# Patient Record
Sex: MALE | Race: WHITE | NOT HISPANIC OR LATINO | Employment: OTHER | URBAN - METROPOLITAN AREA
[De-identification: names, ages, dates, MRNs, and addresses within clinical notes are randomized per-mention and may not be internally consistent; named-entity substitution may affect disease eponyms.]

---

## 2017-03-11 ENCOUNTER — HOSPITAL ENCOUNTER (INPATIENT)
Facility: CLINIC | Age: 82
LOS: 3 days | Discharge: HOME | DRG: 872 | End: 2017-03-14
Attending: EMERGENCY MEDICINE | Admitting: INTERNAL MEDICINE
Payer: MEDICARE

## 2017-03-11 ENCOUNTER — APPOINTMENT (OUTPATIENT)
Dept: CT IMAGING | Facility: CLINIC | Age: 82
DRG: 872 | End: 2017-03-11
Attending: EMERGENCY MEDICINE
Payer: MEDICARE

## 2017-03-11 ENCOUNTER — APPOINTMENT (OUTPATIENT)
Dept: GENERAL RADIOLOGY | Facility: CLINIC | Age: 82
DRG: 872 | End: 2017-03-11
Attending: EMERGENCY MEDICINE
Payer: MEDICARE

## 2017-03-11 DIAGNOSIS — A41.9 SEPSIS, DUE TO UNSPECIFIED ORGANISM: ICD-10-CM
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
## 2017-03-11 DIAGNOSIS — J10.1 INFLUENZA A: Primary | ICD-10-CM

## 2017-03-11 DIAGNOSIS — R53.1 GENERALIZED WEAKNESS: ICD-10-CM

## 2017-03-11 DIAGNOSIS — R41.0 CONFUSION: ICD-10-CM

## 2017-03-11 DIAGNOSIS — G30.9 ALZHEIMER'S DEMENTIA WITHOUT BEHAVIORAL DISTURBANCE, UNSPECIFIED TIMING OF DEMENTIA ONSET: ICD-10-CM

## 2017-03-11 DIAGNOSIS — F03.90 DEMENTIA WITHOUT BEHAVIORAL DISTURBANCE, UNSPECIFIED DEMENTIA TYPE: ICD-10-CM

## 2017-03-11 DIAGNOSIS — F02.80 ALZHEIMER'S DEMENTIA WITHOUT BEHAVIORAL DISTURBANCE, UNSPECIFIED TIMING OF DEMENTIA ONSET: ICD-10-CM

## 2017-03-11 LAB
ALBUMIN SERPL-MCNC: 4 G/DL (ref 3.4–5)
ALBUMIN UR-MCNC: 30 MG/DL
ALP SERPL-CCNC: 78 U/L (ref 40–150)
ALT SERPL W P-5'-P-CCNC: 30 U/L (ref 0–70)
ANION GAP SERPL CALCULATED.3IONS-SCNC: 5 MMOL/L (ref 3–14)
APPEARANCE UR: CLEAR
AST SERPL W P-5'-P-CCNC: 47 U/L (ref 0–45)
BASOPHILS # BLD AUTO: 0 10E9/L (ref 0–0.2)
BASOPHILS NFR BLD AUTO: 0 %
BILIRUB SERPL-MCNC: 1.2 MG/DL (ref 0.2–1.3)
BILIRUB UR QL STRIP: NEGATIVE
BUN SERPL-MCNC: 16 MG/DL (ref 7–30)
CALCIUM SERPL-MCNC: 8.6 MG/DL (ref 8.5–10.1)
CHLORIDE SERPL-SCNC: 102 MMOL/L (ref 94–109)
CO2 SERPL-SCNC: 30 MMOL/L (ref 20–32)
COLOR UR AUTO: YELLOW
CREAT SERPL-MCNC: 1.11 MG/DL (ref 0.66–1.25)
DIFFERENTIAL METHOD BLD: ABNORMAL
EOSINOPHIL # BLD AUTO: 0 10E9/L (ref 0–0.7)
EOSINOPHIL NFR BLD AUTO: 0 %
ERYTHROCYTE [DISTWIDTH] IN BLOOD BY AUTOMATED COUNT: 12.8 % (ref 10–15)
FLUAV+FLUBV AG SPEC QL: ABNORMAL
FLUAV+FLUBV AG SPEC QL: POSITIVE
GFR SERPL CREATININE-BSD FRML MDRD: 62 ML/MIN/1.7M2
GLUCOSE SERPL-MCNC: 135 MG/DL (ref 70–99)
GLUCOSE UR STRIP-MCNC: NEGATIVE MG/DL
HCT VFR BLD AUTO: 41.4 % (ref 40–53)
HGB BLD-MCNC: 14.3 G/DL (ref 13.3–17.7)
HGB UR QL STRIP: ABNORMAL
HYALINE CASTS #/AREA URNS LPF: 1 /LPF (ref 0–2)
IMM GRANULOCYTES # BLD: 0 10E9/L (ref 0–0.4)
IMM GRANULOCYTES NFR BLD: 0.2 %
INR PPP: 2.04 (ref 0.86–1.14)
INTERPRETATION ECG - MUSE: NORMAL
KETONES UR STRIP-MCNC: 10 MG/DL
LACTATE BLD-SCNC: 1.7 MMOL/L (ref 0.7–2.1)
LEUKOCYTE ESTERASE UR QL STRIP: NEGATIVE
LYMPHOCYTES # BLD AUTO: 0.5 10E9/L (ref 0.8–5.3)
LYMPHOCYTES NFR BLD AUTO: 4 %
MCH RBC QN AUTO: 32.5 PG (ref 26.5–33)
MCHC RBC AUTO-ENTMCNC: 34.5 G/DL (ref 31.5–36.5)
MCV RBC AUTO: 94 FL (ref 78–100)
MONOCYTES # BLD AUTO: 0.5 10E9/L (ref 0–1.3)
MONOCYTES NFR BLD AUTO: 4.5 %
MUCOUS THREADS #/AREA URNS LPF: PRESENT /LPF
NEUTROPHILS # BLD AUTO: 11.1 10E9/L (ref 1.6–8.3)
NEUTROPHILS NFR BLD AUTO: 91.3 %
NITRATE UR QL: NEGATIVE
NRBC # BLD AUTO: 0 10*3/UL
NRBC BLD AUTO-RTO: 0 /100
PH UR STRIP: 6 PH (ref 5–7)
PLATELET # BLD AUTO: 146 10E9/L (ref 150–450)
POTASSIUM SERPL-SCNC: 3.8 MMOL/L (ref 3.4–5.3)
PROT SERPL-MCNC: 7.7 G/DL (ref 6.8–8.8)
RBC # BLD AUTO: 4.4 10E12/L (ref 4.4–5.9)
RBC #/AREA URNS AUTO: 3 /HPF (ref 0–2)
SODIUM SERPL-SCNC: 137 MMOL/L (ref 133–144)
SP GR UR STRIP: 1.01 (ref 1–1.03)
SPECIMEN SOURCE: ABNORMAL
TROPONIN I SERPL-MCNC: NORMAL UG/L (ref 0–0.04)
TSH SERPL DL<=0.005 MIU/L-ACNC: 0.65 MU/L (ref 0.4–4)
URN SPEC COLLECT METH UR: ABNORMAL
UROBILINOGEN UR STRIP-MCNC: NORMAL MG/DL (ref 0–2)
WBC # BLD AUTO: 12.1 10E9/L (ref 4–11)
WBC #/AREA URNS AUTO: <1 /HPF (ref 0–2)

## 2017-03-11 PROCEDURE — 87804 INFLUENZA ASSAY W/OPTIC: CPT | Performed by: EMERGENCY MEDICINE

## 2017-03-11 PROCEDURE — 25000128 H RX IP 250 OP 636: Performed by: EMERGENCY MEDICINE

## 2017-03-11 PROCEDURE — 85610 PROTHROMBIN TIME: CPT | Performed by: EMERGENCY MEDICINE

## 2017-03-11 PROCEDURE — A9270 NON-COVERED ITEM OR SERVICE: HCPCS | Mod: GY | Performed by: EMERGENCY MEDICINE

## 2017-03-11 PROCEDURE — 12000000 ZZH R&B MED SURG/OB

## 2017-03-11 PROCEDURE — 99223 1ST HOSP IP/OBS HIGH 75: CPT | Mod: AI | Performed by: INTERNAL MEDICINE

## 2017-03-11 PROCEDURE — 71020 XR CHEST 2 VW: CPT

## 2017-03-11 PROCEDURE — 80053 COMPREHEN METABOLIC PANEL: CPT | Performed by: EMERGENCY MEDICINE

## 2017-03-11 PROCEDURE — 99285 EMERGENCY DEPT VISIT HI MDM: CPT | Mod: 25

## 2017-03-11 PROCEDURE — 70450 CT HEAD/BRAIN W/O DYE: CPT

## 2017-03-11 PROCEDURE — A9270 NON-COVERED ITEM OR SERVICE: HCPCS | Mod: GY | Performed by: INTERNAL MEDICINE

## 2017-03-11 PROCEDURE — 84484 ASSAY OF TROPONIN QUANT: CPT | Performed by: EMERGENCY MEDICINE

## 2017-03-11 PROCEDURE — 85025 COMPLETE CBC W/AUTO DIFF WBC: CPT | Performed by: EMERGENCY MEDICINE

## 2017-03-11 PROCEDURE — 25000132 ZZH RX MED GY IP 250 OP 250 PS 637: Mod: GY | Performed by: EMERGENCY MEDICINE

## 2017-03-11 PROCEDURE — 25000132 ZZH RX MED GY IP 250 OP 250 PS 637: Mod: GY | Performed by: INTERNAL MEDICINE

## 2017-03-11 PROCEDURE — 25000125 ZZHC RX 250: Performed by: INTERNAL MEDICINE

## 2017-03-11 PROCEDURE — 83605 ASSAY OF LACTIC ACID: CPT | Performed by: EMERGENCY MEDICINE

## 2017-03-11 PROCEDURE — 81001 URINALYSIS AUTO W/SCOPE: CPT | Performed by: EMERGENCY MEDICINE

## 2017-03-11 PROCEDURE — 84443 ASSAY THYROID STIM HORMONE: CPT | Performed by: EMERGENCY MEDICINE

## 2017-03-11 RX ORDER — NALOXONE HYDROCHLORIDE 0.4 MG/ML
.1-.4 INJECTION, SOLUTION INTRAMUSCULAR; INTRAVENOUS; SUBCUTANEOUS
Status: DISCONTINUED | OUTPATIENT
Start: 2017-03-11 | End: 2017-03-14 | Stop reason: HOSPADM

## 2017-03-11 RX ORDER — POTASSIUM CHLORIDE 1500 MG/1
20-40 TABLET, EXTENDED RELEASE ORAL
Status: DISCONTINUED | OUTPATIENT
Start: 2017-03-11 | End: 2017-03-14 | Stop reason: HOSPADM

## 2017-03-11 RX ORDER — PROCHLORPERAZINE MALEATE 5 MG
5 TABLET ORAL EVERY 6 HOURS PRN
Status: DISCONTINUED | OUTPATIENT
Start: 2017-03-11 | End: 2017-03-14 | Stop reason: HOSPADM

## 2017-03-11 RX ORDER — POLYETHYLENE GLYCOL 3350 17 G/17G
17 POWDER, FOR SOLUTION ORAL DAILY PRN
Status: DISCONTINUED | OUTPATIENT
Start: 2017-03-11 | End: 2017-03-14 | Stop reason: HOSPADM

## 2017-03-11 RX ORDER — POTASSIUM CHLORIDE 29.8 MG/ML
20 INJECTION INTRAVENOUS
Status: DISCONTINUED | OUTPATIENT
Start: 2017-03-11 | End: 2017-03-14 | Stop reason: HOSPADM

## 2017-03-11 RX ORDER — METOPROLOL SUCCINATE 25 MG/1
25 TABLET, EXTENDED RELEASE ORAL DAILY
Status: DISCONTINUED | OUTPATIENT
Start: 2017-03-11 | End: 2017-03-14 | Stop reason: HOSPADM

## 2017-03-11 RX ORDER — SODIUM CHLORIDE AND POTASSIUM CHLORIDE 150; 900 MG/100ML; MG/100ML
INJECTION, SOLUTION INTRAVENOUS CONTINUOUS
Status: DISCONTINUED | OUTPATIENT
Start: 2017-03-11 | End: 2017-03-13

## 2017-03-11 RX ORDER — ASPIRIN 81 MG/1
81 TABLET ORAL DAILY
Status: DISCONTINUED | OUTPATIENT
Start: 2017-03-12 | End: 2017-03-14 | Stop reason: HOSPADM

## 2017-03-11 RX ORDER — ONDANSETRON 4 MG/1
4 TABLET, ORALLY DISINTEGRATING ORAL EVERY 6 HOURS PRN
Status: DISCONTINUED | OUTPATIENT
Start: 2017-03-11 | End: 2017-03-14 | Stop reason: HOSPADM

## 2017-03-11 RX ORDER — BISACODYL 10 MG
10 SUPPOSITORY, RECTAL RECTAL DAILY PRN
Status: DISCONTINUED | OUTPATIENT
Start: 2017-03-11 | End: 2017-03-14 | Stop reason: HOSPADM

## 2017-03-11 RX ORDER — OSELTAMIVIR PHOSPHATE 30 MG/1
30 CAPSULE ORAL ONCE
Status: DISCONTINUED | OUTPATIENT
Start: 2017-03-11 | End: 2017-03-11

## 2017-03-11 RX ORDER — OSELTAMIVIR PHOSPHATE 30 MG/1
30 CAPSULE ORAL ONCE
Status: COMPLETED | OUTPATIENT
Start: 2017-03-11 | End: 2017-03-11

## 2017-03-11 RX ORDER — POTASSIUM CHLORIDE 1.5 G/1.58G
20-40 POWDER, FOR SOLUTION ORAL
Status: DISCONTINUED | OUTPATIENT
Start: 2017-03-11 | End: 2017-03-14 | Stop reason: HOSPADM

## 2017-03-11 RX ORDER — ATORVASTATIN CALCIUM 10 MG/1
20 TABLET, FILM COATED ORAL DAILY
Status: DISCONTINUED | OUTPATIENT
Start: 2017-03-11 | End: 2017-03-14 | Stop reason: HOSPADM

## 2017-03-11 RX ORDER — ACETAMINOPHEN 325 MG/1
650 TABLET ORAL EVERY 4 HOURS PRN
Status: DISCONTINUED | OUTPATIENT
Start: 2017-03-11 | End: 2017-03-14 | Stop reason: HOSPADM

## 2017-03-11 RX ORDER — AMOXICILLIN 250 MG
1-2 CAPSULE ORAL 2 TIMES DAILY PRN
Status: DISCONTINUED | OUTPATIENT
Start: 2017-03-11 | End: 2017-03-14 | Stop reason: HOSPADM

## 2017-03-11 RX ORDER — ONDANSETRON 2 MG/ML
4 INJECTION INTRAMUSCULAR; INTRAVENOUS EVERY 6 HOURS PRN
Status: DISCONTINUED | OUTPATIENT
Start: 2017-03-11 | End: 2017-03-14 | Stop reason: HOSPADM

## 2017-03-11 RX ORDER — WARFARIN SODIUM 5 MG/1
5 TABLET ORAL
Status: COMPLETED | OUTPATIENT
Start: 2017-03-11 | End: 2017-03-11

## 2017-03-11 RX ORDER — POTASSIUM CHLORIDE 7.45 MG/ML
10 INJECTION INTRAVENOUS
Status: DISCONTINUED | OUTPATIENT
Start: 2017-03-11 | End: 2017-03-14 | Stop reason: HOSPADM

## 2017-03-11 RX ORDER — OSELTAMIVIR PHOSPHATE 30 MG/1
30 CAPSULE ORAL 2 TIMES DAILY
Status: DISCONTINUED | OUTPATIENT
Start: 2017-03-12 | End: 2017-03-14 | Stop reason: HOSPADM

## 2017-03-11 RX ORDER — ACETAMINOPHEN 325 MG/1
650 TABLET ORAL ONCE
Status: COMPLETED | OUTPATIENT
Start: 2017-03-11 | End: 2017-03-11

## 2017-03-11 RX ORDER — PROCHLORPERAZINE 25 MG
12.5 SUPPOSITORY, RECTAL RECTAL EVERY 12 HOURS PRN
Status: DISCONTINUED | OUTPATIENT
Start: 2017-03-11 | End: 2017-03-14 | Stop reason: HOSPADM

## 2017-03-11 RX ADMIN — ACETAMINOPHEN 650 MG: 325 TABLET, FILM COATED ORAL at 16:21

## 2017-03-11 RX ADMIN — SODIUM CHLORIDE 1000 ML: 9 INJECTION, SOLUTION INTRAVENOUS at 15:39

## 2017-03-11 RX ADMIN — POTASSIUM CHLORIDE AND SODIUM CHLORIDE: 900; 150 INJECTION, SOLUTION INTRAVENOUS at 19:15

## 2017-03-11 RX ADMIN — ATORVASTATIN CALCIUM 20 MG: 10 TABLET, FILM COATED ORAL at 19:15

## 2017-03-11 RX ADMIN — OSELTAMIVIR PHOSPHATE 30 MG: 30 CAPSULE ORAL at 17:10

## 2017-03-11 RX ADMIN — WARFARIN SODIUM 5 MG: 5 TABLET ORAL at 19:15

## 2017-03-11 ASSESSMENT — ENCOUNTER SYMPTOMS
WEAKNESS: 1
FATIGUE: 1
LIGHT-HEADEDNESS: 1
APPETITE CHANGE: 1
FEVER: 0
HEADACHES: 0

## 2017-03-11 NOTE — IP AVS SNAPSHOT
MRN:[id number]                      After Visit Summary   3/11/2017    Rell Erazo    MRN: [id number]           Thank you!     Thank you for choosing [facility] for your care. Our goal is always to provide you with excellent care. Hearing back from our patients is one way we can continue to improve our services. Please take a few minutes to complete the written survey that you may receive in the mail after you visit with us. Thank you!        Patient Information     Date Of Birth          6/5/1928        About your hospital stay     You were admitted on:  March 11, 2017 You last received care in the:  [facility] [address] [facility]    You were discharged on:  March 14, 2017        Reason for your hospital stay       You were hospitalized secondary to influenza infection.                  Who to Call     For medical emergencies, please call 911.  For non-urgent questions about your medical care, please call your primary care provider or clinic, None          Attending Provider     Provider Specialty    Pedro Santos DO Emergency Medicine    Gillian Hansen MD Internal Medicine       Primary Care Provider    None Specified       No primary provider on file.         When to contact your care team       Call your primary doctor if you have any of the following: temperature greater than 100.5,  increased shortness of breath or increased pain.                  After Care Instructions     Activity       Your activity upon discharge: activity as tolerated            Diet       Follow this diet upon discharge: Orders Placed This Encounter      Combination Diet Regular Diet Adult, Safe Tray - with utensils                  Follow-up Appointments     Follow-up and recommended labs and tests        Follow up with primary care provider, Dr. Robert through [facility], within 7-14 days for hospital follow- up.  No follow up labs or test are needed.            Follow-up and  "recommended labs and tests        Coumadin clinic on 3/16 to follow up on INR.                  Additional Services     Home Care OT Referral for Hospital Discharge       OT to eval and treat. Needs home safety evaluation as well as assessments for safety driving, etc.     Your provider has ordered home care - occupational therapy. If you have not been contacted within 2 days of your discharge please call the department phone number listed on the top of this document.            Home care nursing referral       RN skilled nursing visit. RN to assess hydration, nutrition and bowel status and home safety.    Your provider has ordered home care nursing services. If you have not been contacted within 2 days of your discharge please call the inpatient department phone number at [phone] .                  Pending Results     No orders found from 3/9/2017 to 3/12/2017.            Statement of Approval     Ordered          03/14/17 1335  I have reviewed and agree with all the recommendations and orders detailed in this document.  EFFECTIVE NOW     Approved and electronically signed by:  Maico Rea MD             Admission Information     Date & Time Provider Department Dept. Phone    3/11/2017 Gillian Hansen MD [facility] [address] [facility] [phone]      Your Vitals Were     Blood Pressure Pulse Temperature Respirations Height Weight    152/84 (BP Location: Right arm) 57 98.3  F (36.8  C) (Oral) 16 1.803 m (5' 11\") 61.3 kg (135 lb 3.2 oz)    Pulse Oximetry BMI (Body Mass Index)                96% 18.86 kg/m2          MyChart Information     [employer] lets you send messages to your doctor, view your test results, renew your prescriptions, schedule appointments and more. To sign up, go to [email or web address] . Click on \"Log in\" on the left side of the screen, which will take you to the Welcome page. Then click on \"Sign up Now\" on the right side of the page.     You will be asked to " enter the access code listed below, as well as some personal information. Please follow the directions to create your username and password.     Your access code is: [email or web address]  Expires: 2017  2:29 PM     Your access code will  in 90 days. If you need help or a new code, please call your [facility] clinic or [phone].        Care EveryWhere ID     This is your Care EveryWhere ID. This could be used by other organizations to access your [facility] medical records  [id number]           Review of your medicines      START taking        Dose / Directions    oseltamivir 30 MG capsule   Commonly known as:  TAMIFLU   Indication:  Flu        Dose:  30 mg   Take 1 capsule (30 mg) by mouth 2 times daily   Quantity:  6 capsule   Refills:  0         CONTINUE these medicines which have NOT CHANGED        Dose / Directions    ASPIRIN EC PO        Dose:  81 mg   Take 81 mg by mouth daily   Refills:  0       LIPITOR PO        Dose:  20 mg   Take 20 mg by mouth daily   Refills:  0       metoprolol 25 MG 24 hr tablet   Commonly known as:  TOPROL-XL        Dose:  25 mg   Take 1 tablet (25 mg) by mouth daily   Quantity:  30 tablet   Refills:  1       multivitamin, therapeutic with minerals Tabs tablet        Dose:  1 tablet   Take 1 tablet by mouth daily   Refills:  0       PATADAY 0.2 % Soln   Generic drug:  olopatadine HCl        Dose:  1 drop   Place 1 drop into both eyes daily as needed   Refills:  0       warfarin 5 MG tablet   Commonly known as:  COUMADIN        Dose:  5 mg   Take 1 tablet (5 mg) by mouth daily   Quantity:  30 tablet   Refills:  0            Where to get your medicines      These medications were sent to [id number] - [place], [place] - [address]  [address] [facility] [address]     Phone:  [phone]     oseltamivir 30 MG capsule                Protect others around you: Learn how to safely use, store and throw away your medicines at www.disposemymeds.org.              Medication List: This is a list of all your medications and when to take them. Check marks below indicate your daily home schedule. Keep this list as a reference.      Medications           Morning Afternoon Evening Bedtime As Needed    ASPIRIN EC PO   Take 81 mg by mouth daily   Last time this was given:  81 mg on 3/14/2017  9:05 AM   Next Dose Due:  Take tomorrow, Wednesday 3/15/17                                   LIPITOR PO   Take 20 mg by mouth daily   Last time this was given:  20 mg on 3/14/2017  9:05 AM   Next Dose Due:  Take tomorrow, Wednesday 3/15/17                                   metoprolol 25 MG 24 hr tablet   Commonly known as:  TOPROL-XL   Take 1 tablet (25 mg) by mouth daily   Last time this was given:  25 mg on 3/14/2017  9:05 AM   Next Dose Due:  Take tomorrow, Wednesday 3/15/17                                   multivitamin, therapeutic with minerals Tabs tablet   Take 1 tablet by mouth daily   Next Dose Due:  Take tomorrow, Wednesday 3/15/17                                   oseltamivir 30 MG capsule   Commonly known as:  TAMIFLU   Take 1 capsule (30 mg) by mouth 2 times daily   Last time this was given:  30 mg on 3/14/2017  9:05 AM   Next Dose Due:  Take tonight, Tuesday 3/14/17                                      PATADAY 0.2 % Soln   Place 1 drop into both eyes daily as needed   Generic drug:  olopatadine HCl                                   warfarin 5 MG tablet   Commonly known as:  COUMADIN   Take 1 tablet (5 mg) by mouth daily   Last time this was given:  4 mg on 3/13/2017  5:35 PM   Next Dose Due:  Take tonight, Tuesday 3/14/17                                             More Information        Flu Vaccines for Adults   The flu (influenza) is caused by a virus that is easily spread. A flu vaccine protects you and others from the flu. It s best to get a flu shot each fall, as soon as the vaccine is available in your area. You can get it at your health care provider s office or a  health clinic. Drugstores, senior centers, and workplaces often offer flu shots, too. If you want to know if your provider has the flu vaccine available, or if you have other questions, ask your healthcare provider.    Flu facts    The flu shot will not give you the flu.    The flu can be dangerous--even life-threatening. Every year, about 36,000 people die of complications from the flu.    The flu is caused by a virus. It can t be treated with antibiotics.    Influenza is not the same as  stomach flu,  the 24-hour bug that causes vomiting and diarrhea. This is most likely due to a GI (gastrointestinal) infection--not the flu.    You need to get a flu shot each year.   Flu symptoms  Flu symptoms tend to come on quickly. Fever, headache, fatigue, cough, sore throat, runny nose, and muscle aches are symptoms of the flu. Upset stomach and vomiting are not common for adults. Some symptoms, such as fatigue and cough, may last a few weeks.  How a flu shot protects you  There are many strains (types) of the flu virus. Medical experts predict which strains are most likely to make people sick each year. Flu shots are made from these strains. When you get a flu vaccine, inactivated ( killed ) or very mild flu viruses are injected into your body or sprayed into your nose. These cannot give you the flu. But they do prompt your body to make antibodies to fight these flu strains. If you re exposed to the same strains later in the flu season, the antibodies will fight off the germs.  Recommendations for the flu vaccine  The CDC recommends that infants over the age of 6 months and all children and adults should get flu shots every year.  Some people are at an increased risk of developing serious complications from the flu. It is extremely important that these people get the vaccine. They include those with:    Long-term heart and lung conditions    Other serious medical conditions    Endocrine disorders, like diabetes    Kidney or  liver disorders    Weakened immune systems from disease of medical treatment; for example, those with HIV or AIDS or taking long-term steroids or medications to treat cancer    Blood disorders, such as sickle cell disease  It is also very important that others that have an increased risk of being exposed to the flu or are around people with increased risk of complications get the vaccine. They are:    Health care providers and other staff that provide care in hospitals, nursing homes, home health, and other facilities    Household members, including children, of people in high-risk groups  Types of flu vaccines  The flu vaccine is available as a shot and as a nasal spray. Your health care provider will determine which vaccine is right for you.    The shot is available in a few different forms. There is a high-dose vaccine for those over 65 and a vaccine for those with egg allergies. It is safe for most people. Talk with your provider if you have had:    A severe allergic reaction to a previous flu vaccine    Guillain-Dodge syndrome (a severe paralyzing condition)    The nasal spray is recommended for people from 2 to 49 years old. It should not be given to adults who:    Are pregnant    Have weakened immune systems    Have egg allergies    Will be in close contact with someone with a weakened immune system    Have taken antiviral medication in the past 2 days    1270-0578 The [employer]. [address] [place] [address]. All rights reserved. This information is not intended as a substitute for professional medical care. Always follow your healthcare professional's instructions.                Understanding Dementia  Dementia is the name for a group of brain conditions that make it harder to remember, reason, and communicate. The most common form of dementia is Alzheimer disease. Other types include vascular dementia, frontotemporal dementia, and Lewy body dementia. Years ago, dementia was often  called  senility.  It was even thought to be a normal part of aging. We now know that it s not normal. It s caused by ongoing damage to cells in the brain.    Symptoms of dementia  Symptoms differ depending on which parts of the brain are affected and the stage of the disease. The most common symptoms include:    Memory loss, including trouble with directions and familiar tasks    Language problems, such as trouble getting words out or understanding what is said    Difficulty with planning, organizing, concentration, and judgment. This includes people not being able to recognize their own symptoms.    Changes in behavior and personality  How dementia affects the brain  The brain controls all the workings of the mind and body. Some parts of the brain control memory and language. Other parts control movement and coordination. With dementia, nerve cells in the brain are gradually damaged or destroyed. Why this happens is not yet clear. But over time, parts of the brain begin to atrophy (shrink). Brain atrophy often starts in the part of the brain that controls memory, reasoning, and personality. Other parts of the brain may not be affected until much later in the illness.  The stages of dementia  Dementia is a progressive disease. This means it gets worse over time. Symptoms differ for each person, but there are 3 basic stages. Each may last from months to years:    In the early stage, a person may seem forgetful, confused, or have changes in behavior. However, he or she may still be able to handle most tasks without help.    In the middle stage, more and more help is needed with daily tasks. A person may have trouble recognizing friends and family members, wander, or get lost in familiar places. He or she may also become restless or beltran.    In the late stage, Alzheimer s can cause severe problems with memory, judgment, and other skills. Help is needed with nearly every aspect of daily life.  Treating dementia  At  present, there s no cure for dementia. But with proper care, many people can live comfortably for years:     Medicines are a key part of treatment. Some types can help slow the progression of symptoms, such as memory loss. Others can help ease mood, behavior, and sleep problems. These medicines work for some people but not all.    Activity and exercise are good for body and mind. They may even help slow the progression of the disease. Simple, repetitive activities are good choices.    Regular healthcare provider visits help keep track of symptoms and overall health.    Sleep-wake cycle can be mixed up in patients with dementia. They may function better being up at nighttime and sleeping during the daytime.      Social interactions are important to maintain.      4401-6492 The [employer]. [address] [place] [address], [place] [address]. All rights reserved. This information is not intended as a substitute for professional medical care. Always follow your healthcare professional's instructions.                Influenza  Influenza ( the flu ) is an infection that affects your respiratory tract (the mouth, nose, and lungs, and the passages between them). Unlike a cold, the flu can make you very ill. And it can lead to pneumonia, a serious lung infection. For some people, especially older adults, young children, and people with certain chronic conditions, the flu can have serious complications and even be fatal.  What Are the Risk Factors for the Flu?     Viruses that cause influenza spread through the air in droplets when someone who has the flu coughs, sneezes, laughs, or talks.   Anyone can get the flu. But you re more likely to become infected if you:    Have a weakened immune system.    Work in a health care setting where you may be exposed to flu germs.    Live or work with someone who has the flu.    Haven t received an annual flu shot.  How Does the Flu Spread?  The flu is caused by viruses. The viruses  spread through the air in droplets when someone who has the flu coughs, sneezes, laughs, or talks. You can become infected when you inhale these viruses directly. You can also become infected when you touch a surface on which the droplets have landed and then transfer the germs to your eyes, nose, or mouth. Touching used tissues, or sharing utensils, drinking glasses, or a toothbrush with an infected person can expose you to flu viruses, too.  What Are the Symptoms of the Flu?  Flu symptoms tend to come on quickly and may last a few days to a few weeks. They include:    Fever usually higher than 101 F  (38.3 C) and chills    Sore throat and headache    Dry cough    Runny nose    Tiredness and weakness    Muscle aches  Factors That Can Make Flu Worse  For some people, the flu can be very serious. The risk of complications is greater for:    Children under age 5.    Adults 65 years of age and older.    People with a chronic illness, such as diabetes or heart, kidney, or lung disease.    People who live in a nursing home or long-term care facility.   How Is the Flu Treated?  Influenza usually improves after 7 days or so. In some cases, your health care provider may prescribe an antiviral medication. This may help you get well sooner. For the medication to help, you need to take it as soon as possible (ideally within 48 hours) after your symptoms start. If you develop pneumonia or other serious illness, hospital care may be needed.  Easing Flu Symptoms    Drink lots of fluids such as water, juice, and warm soup. A good rule is to drink enough so that you urinate your normal amount.    Get plenty of rest.    Ask your health care provider what to take for fever and pain.    Call your provider if your fever rises over 101 F (38.3 C) or you become dizzy, lightheaded, or short of breath.  Taking Steps to Protect Others    Wash your hands often, especially after coughing or sneezing. Or, clean your hands with an alcohol-based  hand  containing at least 60 percent alcohol.    Cough or sneeze into a tissue. Then throw the tissue away and wash your hands. If you don t have a tissue, cough and sneeze into the crook of your elbow.    Stay home until at least 24 hours after you no longer have a fever or chills. Be sure the fever isn t being hidden by fever-reducing medication.    Don t share food, utensils, drinking glasses, or a toothbrush with others.    Ask your health care provider if others in your household should receive antiviral medication to help them avoid infection.  How Can the Flu Be Prevented?    One of the best ways to avoid the flu is to get a flu vaccination each year. Viruses that cause the flu change from year to year. For that reason, doctors recommend getting the flu vaccine each year, as soon as it's available in your area. The vaccine may be given as a shot or as a nasal spray. Your health care provider can tell you which vaccine is right for you.    Wash your hands often. Frequent handwashing is a proven way to help prevent infection.    Carry an alcohol-based hand gel containing at least 60 percent alcohol. Use it when you don t have access to soap and water. Then wash your hands as soon as you can.    Avoid touching your eyes, nose, and mouth.    At home and work, clean phones, computer keyboards, and toys often with disinfectant wipes.    If possible, avoid close contact with others who have the flu or symptoms of the flu.  Handwashing Tips  Handwashing is one of the best ways to prevent many common infections. If you re caring for or visiting someone with the flu, wash your hands each time you enter and leave the room. Follow these steps:    Use warm water and plenty of soap. Rub your hands together well.    Clean the whole hand, under your nails, between your fingers, and up the wrists.    Wash for at least 15 seconds.    Rinse, letting the water run down your fingers, not up your wrists.    Dry your hands  well. Use a paper towel to turn off the faucet and open the door.  Using Alcohol-Based Hand   Alcohol-based hand  are also a good choice. Use them when you don t have access to soap and water. Follow these steps:    Squeeze about a tablespoon of gel into the palm of one hand.    Rub your hands together briskly, cleaning the backs of your hands, the palms, between your fingers, and up the wrists.    Rub until the gel is gone and your hands are completely dry.  Preventing Influenza in Healthcare Settings  The flu is a special concern for people in hospitals and long-term care facilities. To help prevent the spread of flu, many hospitals and nursing homes take these steps:    Health care providers wash their hands or use an alcohol-based hand  before and after treating each patient.    People with the flu have private rooms and bathrooms or share a room with someone with the same infection.    High-risk patients who don t have the flu are encouraged to get the flu and pneumonia vaccines.    All health care workers are encouraged or required to get flu shots.        3976-4503 The [employer]. [address] [place] [address]. All rights reserved. This information is not intended as a substitute for professional medical care. Always follow your healthcare professional's instructions.                Influenza (Adult)    Influenza is also called the flu. It is a viral illness that affects the air passages of your lungs. It is different from the common cold. The flu can easily be passed from one to person to another. It may be spread through the air by coughing and sneezing. Or it can be spread by touching the sick person and then touching your own eyes, nose, or mouth.  The flu starts 1 to 3 days after you are exposed to the flu virus. It may last for 1 to 2 weeks. You usually don t need to take antibiotics unless you have a complication. This might be an ear or sinus infection or  pneumonia.  Symptoms of the flu may be mild or severe. They can include extreme tiredness (wanting to stay in bed all day), chills, fevers, muscle aches, soreness with eye movement, headache, and a dry, hacking cough.  Home care  Follow these guidelines when caring for yourself at home:    Avoid being around cigarette smoke, whether yours or other people s.    Acetaminophen or ibuprofen will help ease your fever, muscle aches, and headache. Don t give aspirin to anyone younger than 18 who has the flu. Aspirin can harm the liver.    Nausea and loss of appetite are common with the flu. Eat light meals. Drink 6 to 8 glasses of liquids every day. Good choices are water, sport drinks, soft drinks without caffeine, juices, tea, and soup. Extra fluids will also help loosen secretions in your nose and lungs.    Over-the-counter cold medicines will not make the flu go away faster. But the medicines may help with coughing, sore throat, and congestion in your nose and sinuses. Don t use a decongestant if you have high blood pressure.    Stay home until your fever has been gone for at least 24 hours without using medicine to reduce fever.  Follow-up care  Follow up with your health care provider, or as advised, if you are not getting better over the next week.  If you are 65 or older, talk with your provider about getting a pneumococcal vaccine every 5 years. You should also get this vaccine if you have chronic asthma or COPD. All adults should get a flu vaccine every fall. Ask your provider about this.  When to seek medical advice  Call your health care provider right away if any of these occur:    Cough with lots of colored sputum (mucus) or blood in your sputum    Chest pain, shortness of breath, wheezing, or difficulty breathing    Severe headache, or face, neck, or ear pain    New rash with fever    Fever of 101 F (38 C) oral or higher that doesn t get better with fever medicine    Confusion, behavior change, or  seizure    Severe weakness or dizziness    You get a fever or cough after getting better for a few days       [id number] [employer]. [address] [place] [address], [place] [address]. All rights reserved. This information is not intended as a substitute for professional medical care. Always follow your healthcare professional's instructions.

## 2017-03-11 NOTE — ED PROVIDER NOTES
"  History     Chief Complaint:  Generalized Weakness    The history is provided by the patient and the EMS personnel.      eRll Erazo is an anticoagulated 88 year old male with history of CVA without deficit, CAD requiring CABG, and atrial fibrillation (anticoagulated) who presents with generalized weakness.  Patient lives at home with wife and occasional nursing staff care.  It was reported the patient has been getting weaker over the last several weeks to months, he reports just the last several days. Today he rolled out of bed and was unable to get up, daughter helped him get up and then called EMS.  While ambulating him at [facility] report he was presyncopal.  He was incontinent of urine and stool which is abnormal.  He endorses decreased appetite, last ate yesterday evening.  Patient denies fever, headache, injury or other complaints.  Daughter reports he has mild baseline confusion.    Allergies:  No known drug allergies      Medications:    Aspirin  Plavix  Multivitamin  Pataday solution  Lipitor    Past Medical History:    CAD  CVA  Hyperlipidemia    Past Surgical History:    CABG x 3    Family History:    History reviewed. No pertinent family history.      Social History:  Presents via EMS   Tobacco use: Never  Alcohol use: Occasional  PCP: [facility]    Marital Status:         Review of Systems   Constitutional: Positive for appetite change and fatigue. Negative for fever.   Cardiovascular: Negative for chest pain.   Neurological: Positive for weakness and light-headedness. Negative for headaches.   All other systems reviewed and are negative.      Physical Exam     Patient Vitals for the past 24 hrs:   BP Temp Temp src Heart Rate Resp SpO2 Height Weight   03/11/17 1914 96/49 - - 74 - - 1.803 m (5' 11\") 62.1 kg (136 lb 14.5 oz)   03/11/17 1810 112/58 98.7  F (37.1  C) Oral 68 16 94 % - -   03/11/17 1706 96/50 - - 77 - 94 % - -   03/11/17 1705 - - - - 16 - - -   03/11/17 1700 (!) 86/53 " "- - 71 18 93 % - -   03/11/17 1645 108/58 - - 78 13 94 % - -   03/11/17 1630 127/66 - - 82 20 96 % - -   03/11/17 1615 113/60 - - 82 18 97 % - -   03/11/17 1602 - - - 87 12 94 % - -   03/11/17 1601 126/71 - - - - - - -   03/11/17 1510 - 102.6  F (39.2  C) Rectal - - 95 % - -   03/11/17 1451 145/76 99.1  F (37.3  C) Oral 92 20 96 % 1.803 m (5' 11\") 69.4 kg (153 lb)   03/11/17 1446 - - - - - 95 % - -   03/11/17 1444 145/76 - - - - - - -      Physical Exam  General: Somnolent but easily awoken, cooperative with exam. Patient in mild distress. Elderly male; frail. Febrile  Head:  Scalp is NC/AT  Eyes:  No scleral icterus, PERRL  ENT:  The external nose and ears are normal. The oropharynx is normal and without erythema; mucus membranes are somewhat dry. Uvula midline, no evidence of deep space infection.  Neck:  Normal range of motion without rigidity.   CV:  Regular rate and rhythm  Resp:  Breath sounds are clear bilaterally; occasional mild dry cough    Non-labored, no retractions or accessory muscle use  GI:  Abdomen is soft, no distension, no tenderness.   MS:  No lower extremity edema   :   Normal external male genitalia; no evidence of infection  Skin:  Warm and dry, No rash or lesions noted.  Neuro: Oriented to person and place only. No gross motor deficits.       Emergency Department Course     ECG (14:49:57):  Rate 93 bpm. FL interval 148. QRS duration 86. QT/QTc 350/435. P-R-T axes 80 54 69.  Normal sinus rhythm.  Normal ECG.  Agree with computer interpretation.  No significant change when compared to EKG dated 10/24/16.  Interpreted at 1500 by Pedro Santos DO.     Imaging:  Radiographic findings were communicated with the patient and family who voiced understanding of the findings.    XR Chest:  No acute cardiopulmonary disease identified. The  previously suggested nodule at the right upper lung is very difficult  to visualize on this exam. It may not be fully characterized, or  perhaps " partially obscured by overlying ribs. Nevertheless, recommend  nonurgent CT chest to assess if there is a significant pulmonary  nodule in this region.  Report per radiology.    CT Head without contrast:  Age related changes and white matter changes consistent  with small vessel ischemic disease. No acute abnormality is seen.  Report per radiology.    Laboratory:  CBC:  WBC 12.1 (H), HGB 14.3,  (L)    CMP: Glucose 135 (H), AST 47 (H), otherwise WNL (Creatinine 1.11)    (1525) Troponin: <0.015     INR: 2.04 (H)    TSH with free T4 reflex: 0.65    UA: 10 ketones, small blood, 30 albumin, 3 RBC/HPF (H), mucous present, o/w Negative     Influenza A/B antigen: positive for influenza A    Interventions:  (1539) Normal Saline, 1 liter, IV bolus  (1621) Tylenol, 650 mg, PO    Emergency Department Course:  The patient arrived in the emergency department via EMS.    Past medical records, nursing notes, and vitals reviewed.    1458: I performed an exam of the patient and obtained history, as documented above.     Above workup undertaken.    I personally reviewed the laboratory results with the Patient and daughter and answered all related questions prior to admission.     Findings and plan explained to the patient who consents to admission.   (6883) I discussed the patient with Dr. Hansen of the hospitalist service, who will admit the patient to a medical bed for further monitoring, evaluation, and treatment.      Impression & Plan      Medical Decision Making:  Rell Erazo is an 88 year old male who presents by EMS for significant generalized weakness and fall out of bed this morning; noted to be febrile in ED. The patient's medical record and history were reviewed. Initial consideration for, but not limited to, intracranial bleed/pathology, infectious process, electrolyte abnormality, arrhythmia, thyroid dysfunction, among others. Labs, EKG and imaging was obtained. EKG demonstrates normal sinus rhythm without  evidence of acute ischemia or infarction; troponin negative; patient chest pain free without report of significant shortness of breath (atypical ACS unlikely). UA without evidence of infection. Labs notable for mild elevation of WBC (12.1), therapeutic INR (2.04) and positive influenza A testing. Provided 1 liter of IV fluids as well as 650 mg of tylenol. Chest x-ray and head CT are unremarkable. The patient was provided 30 mg of tamiflu. Given the patient's generalized weakness, sepsis secondary to influenza and confusion, he will be admitted to the hospitalist service for further evaluation and care. The patient remained stable throughout ED course.       Diagnosis:    ICD-10-CM    1. Influenza A J10.1    2. Generalized weakness R53.1    3. Fall from bed, initial encounter W06.XXXA    4. Confusion R41.0    5. Sepsis, due to unspecified organism (H) A41.9        Disposition:  Admitted to Dr. Hansen of the hospitalist service      Tee Bender  3/11/2017    EMERGENCY DEPARTMENT    I, Tee Bender, am serving as a scribe at 2:58 PM on 3/11/2017 to document services personally performed by Pedro Santos DO based on my observations and the provider's statements to me.       Pedro Santos DO  03/11/17 1940

## 2017-03-11 NOTE — ED NOTES
Bed: ED23  Expected date:   Expected time:   Means of arrival:   Comments:  [facility] [place] - 444- 80's increase weakness eta 1448

## 2017-03-11 NOTE — ED NOTES
[facility]  ED Nurse Handoff Report    ED Chief complaint: Generalized Weakness (LIVES WITH WIFE AT HOME. HAVING NURSING CARE AT TIMES. PT FOUND LYING NEXT TO BED. DAUGHTER CAME OVER AND HELPED GET PT BACK INTO BED. .PT THINKS HE ROLLED OUT OF BED. DENIES INJURY. INCONTINENT OF STOOL AND URINE  .PT IS USUALLY CONTINENT OF BOTH. DECREASED APPETITE. LAST ATE LAST NIGHT.  INCREASING WEAKNESS)      ED Diagnosis:   Final diagnoses:   Influenza A   Generalized weakness   Fall from bed, initial encounter   Confusion       Code Status: Full Code    Allergies: No Known Allergies    Activity level:  Stand with Assist     Needed?: No    Isolation: Yes  Infection: Influenza, Droplet precautions    Bariatric?: No      Vital Signs:   Vitals:    03/11/17 1602 03/11/17 1615 03/11/17 1630 03/11/17 1645   BP:  113/60 127/66 108/58   Resp: 12 18 20 13   Temp:       TempSrc:       SpO2: 94% 97% 96% 94%   Weight:       Height:           Cardiac Rhythm: ,        Pain level: 0-10 Pain Scale: 0    Is this patient confused?: Yes    Patient Report: Initial Complaint: Fall, generalized weakness  Focused Assessment: Tmax 102.6, otherwise VSS on R/A. Generalized weakness and malaise for 2-3 days, lethargy, disoriented to situation at times. Pt fell out of bed this AM with new onset incontinence, EMS called. Head CT clear, Influenza A positive.  Tests Performed: labs, flu, head CT, UA  Abnormal Results: Influenza A positive  Treatments provided: 1 L NS bolus initiated, tamiflu    Family Comments: daughter Selam at bedside, does home care for pt and his wife with some in-home nursing cares    OBS brochure/video discussed/provided to patient: N/A    ED Medications:   Medications   oseltamivir (TAMIFLU) capsule 30 mg (not administered)   0.9% sodium chloride BOLUS (1,000 mLs Intravenous New Bag 3/11/17 1539)   acetaminophen (TYLENOL) tablet 650 mg (650 mg Oral Given 3/11/17 1621)       Drips infusing?:  No      ED  NURSE PHONE NUMBER: [phone]

## 2017-03-11 NOTE — PHARMACY-ADMISSION MEDICATION HISTORY
Admission medication history interview status for the 3/11/2017  admission is complete. See EPIC admission navigator for prior to admission medications     Medication history source reliability:Good    Actions taken by pharmacist (provider contacted, etc):Interviewed daughter, called [facility] pharmacy to verify active Rx's as patient unable to answer and daughter did not know medications. List updated per recent fills. Unsure if he takes the 81 mg aspirin as that he could be getting OTC.     Additional medication history information not noted on PTA med list :None    Medication reconciliation/reorder completed by provider prior to medication history? No    Time spent in this activity: 15 minutes    Prior to Admission medications    Medication Sig Last Dose Taking? Auth Provider   ASPIRIN EC PO Take 81 mg by mouth daily 3/10/2017 at Unknown time  Unknown, Entered By History   multivitamin, therapeutic with minerals (THERA-VIT-M) TABS Take 1 tablet by mouth daily 3/10/2017 at Unknown time Yes Unknown, Entered By History   olopatadine HCl (PATADAY) 0.2 % SOLN Place 1 drop into both eyes daily as needed prn  Unknown, Entered By History   Atorvastatin Calcium (LIPITOR PO) Take 20 mg by mouth daily 3/10/2017 at Unknown time Yes Unknown, Entered By History   warfarin (COUMADIN) 5 MG tablet Take 1 tablet (5 mg) by mouth daily 3/10/2017 at Unknown time Yes Boone Shepherd MD   metoprolol (TOPROL-XL) 25 MG 24 hr tablet Take 1 tablet (25 mg) by mouth daily 3/10/2017 at Unknown time Yes Boone Shepherd MD [facility], Pharmacy Intern *[id number]

## 2017-03-11 NOTE — IP AVS SNAPSHOT
[place] [address] [facility]    [address] [place] [address]    Phone:  [phone]                                       After Visit Summary   3/11/2017    Rell Erazo    MRN: [id number]           After Visit Summary Signature Page     I have received my discharge instructions, and my questions have been answered. I have discussed any challenges I see with this plan with the nurse or doctor.    ..........................................................................................................................................  Patient/Patient Representative Signature      ..........................................................................................................................................  Patient Representative Print Name and Relationship to Patient    ..................................................               ................................................  Date                                            Time    ..........................................................................................................................................  Reviewed by Signature/Title    ...................................................              ..............................................  Date                                                            Time

## 2017-03-12 ENCOUNTER — APPOINTMENT (OUTPATIENT)
Dept: OCCUPATIONAL THERAPY | Facility: CLINIC | Age: 82
DRG: 872 | End: 2017-03-12
Attending: INTERNAL MEDICINE
Payer: MEDICARE

## 2017-03-12 LAB
ANION GAP SERPL CALCULATED.3IONS-SCNC: 7 MMOL/L (ref 3–14)
BASOPHILS # BLD AUTO: 0 10E9/L (ref 0–0.2)
BASOPHILS NFR BLD AUTO: 0 %
BUN SERPL-MCNC: 19 MG/DL (ref 7–30)
CALCIUM SERPL-MCNC: 7.7 MG/DL (ref 8.5–10.1)
CHLORIDE SERPL-SCNC: 109 MMOL/L (ref 94–109)
CO2 SERPL-SCNC: 26 MMOL/L (ref 20–32)
CREAT SERPL-MCNC: 1.07 MG/DL (ref 0.66–1.25)
DIFFERENTIAL METHOD BLD: ABNORMAL
EOSINOPHIL # BLD AUTO: 0 10E9/L (ref 0–0.7)
EOSINOPHIL NFR BLD AUTO: 0 %
ERYTHROCYTE [DISTWIDTH] IN BLOOD BY AUTOMATED COUNT: 13.1 % (ref 10–15)
GFR SERPL CREATININE-BSD FRML MDRD: 65 ML/MIN/1.7M2
GLUCOSE SERPL-MCNC: 86 MG/DL (ref 70–99)
HCT VFR BLD AUTO: 33.2 % (ref 40–53)
HGB BLD-MCNC: 11.5 G/DL (ref 13.3–17.7)
IMM GRANULOCYTES # BLD: 0 10E9/L (ref 0–0.4)
IMM GRANULOCYTES NFR BLD: 0.2 %
INR PPP: 2.42 (ref 0.86–1.14)
LYMPHOCYTES # BLD AUTO: 0.8 10E9/L (ref 0.8–5.3)
LYMPHOCYTES NFR BLD AUTO: 9.1 %
MCH RBC QN AUTO: 32.5 PG (ref 26.5–33)
MCHC RBC AUTO-ENTMCNC: 34.6 G/DL (ref 31.5–36.5)
MCV RBC AUTO: 94 FL (ref 78–100)
MONOCYTES # BLD AUTO: 0.4 10E9/L (ref 0–1.3)
MONOCYTES NFR BLD AUTO: 4.3 %
NEUTROPHILS # BLD AUTO: 7.3 10E9/L (ref 1.6–8.3)
NEUTROPHILS NFR BLD AUTO: 86.4 %
NRBC # BLD AUTO: 0 10*3/UL
NRBC BLD AUTO-RTO: 0 /100
PLATELET # BLD AUTO: 117 10E9/L (ref 150–450)
POTASSIUM SERPL-SCNC: 4.1 MMOL/L (ref 3.4–5.3)
RBC # BLD AUTO: 3.54 10E12/L (ref 4.4–5.9)
SODIUM SERPL-SCNC: 142 MMOL/L (ref 133–144)
WBC # BLD AUTO: 8.4 10E9/L (ref 4–11)

## 2017-03-12 PROCEDURE — 97165 OT EVAL LOW COMPLEX 30 MIN: CPT | Mod: GO

## 2017-03-12 PROCEDURE — 80048 BASIC METABOLIC PNL TOTAL CA: CPT | Performed by: INTERNAL MEDICINE

## 2017-03-12 PROCEDURE — 99233 SBSQ HOSP IP/OBS HIGH 50: CPT | Performed by: INTERNAL MEDICINE

## 2017-03-12 PROCEDURE — 85025 COMPLETE CBC W/AUTO DIFF WBC: CPT | Performed by: INTERNAL MEDICINE

## 2017-03-12 PROCEDURE — 40000133 ZZH STATISTIC OT WARD VISIT

## 2017-03-12 PROCEDURE — 99207 ZZC CDG-MDM COMPONENT: MEETS HIGH - UP CODED: CPT | Performed by: INTERNAL MEDICINE

## 2017-03-12 PROCEDURE — A9270 NON-COVERED ITEM OR SERVICE: HCPCS | Mod: GY | Performed by: INTERNAL MEDICINE

## 2017-03-12 PROCEDURE — 12000000 ZZH R&B MED SURG/OB

## 2017-03-12 PROCEDURE — 97535 SELF CARE MNGMENT TRAINING: CPT | Mod: GO

## 2017-03-12 PROCEDURE — 25000132 ZZH RX MED GY IP 250 OP 250 PS 637: Mod: GY | Performed by: INTERNAL MEDICINE

## 2017-03-12 PROCEDURE — 36415 COLL VENOUS BLD VENIPUNCTURE: CPT | Performed by: INTERNAL MEDICINE

## 2017-03-12 PROCEDURE — A9270 NON-COVERED ITEM OR SERVICE: HCPCS | Mod: GY

## 2017-03-12 PROCEDURE — 25000125 ZZHC RX 250: Performed by: INTERNAL MEDICINE

## 2017-03-12 PROCEDURE — 25000132 ZZH RX MED GY IP 250 OP 250 PS 637: Mod: GY

## 2017-03-12 PROCEDURE — 99207 ZZC CDG-CUT & PASTE-POTENTIAL IMPACT ON LEVEL: CPT | Performed by: INTERNAL MEDICINE

## 2017-03-12 PROCEDURE — 85610 PROTHROMBIN TIME: CPT | Performed by: INTERNAL MEDICINE

## 2017-03-12 RX ORDER — WARFARIN SODIUM 2 MG/1
2 TABLET ORAL
Status: COMPLETED | OUTPATIENT
Start: 2017-03-12 | End: 2017-03-12

## 2017-03-12 RX ADMIN — ASPIRIN 81 MG: 81 TABLET, COATED ORAL at 10:01

## 2017-03-12 RX ADMIN — POTASSIUM CHLORIDE AND SODIUM CHLORIDE: 900; 150 INJECTION, SOLUTION INTRAVENOUS at 05:58

## 2017-03-12 RX ADMIN — ATORVASTATIN CALCIUM 20 MG: 10 TABLET, FILM COATED ORAL at 10:01

## 2017-03-12 RX ADMIN — OSELTAMIVIR PHOSPHATE 30 MG: 30 CAPSULE ORAL at 10:02

## 2017-03-12 RX ADMIN — METOPROLOL SUCCINATE 25 MG: 25 TABLET, EXTENDED RELEASE ORAL at 10:01

## 2017-03-12 RX ADMIN — WARFARIN SODIUM 2 MG: 2 TABLET ORAL at 18:15

## 2017-03-12 RX ADMIN — OSELTAMIVIR PHOSPHATE 30 MG: 30 CAPSULE ORAL at 21:10

## 2017-03-12 RX ADMIN — POTASSIUM CHLORIDE AND SODIUM CHLORIDE: 900; 150 INJECTION, SOLUTION INTRAVENOUS at 18:13

## 2017-03-12 ASSESSMENT — ACTIVITIES OF DAILY LIVING (ADL): PREVIOUS_RESPONSIBILITIES: MEAL PREP;HOUSEKEEPING;LAUNDRY;SHOPPING;MEDICATION MANAGEMENT;FINANCES;DRIVING

## 2017-03-12 NOTE — PLAN OF CARE
Problem: Goal Outcome Summary  Goal: Goal Outcome Summary  Outcome: No Change  Pt A/O#4, forgetful at times, VSS, afibrile, Sats 94% on RA, denies pain, occ non-prod cough, slept well, uses urinal at bedside, IVF infusing, continues on Tamiflu, will monitor.

## 2017-03-12 NOTE — PLAN OF CARE
Problem: Goal Outcome Summary  Goal: Goal Outcome Summary  Outcome: No Change  A/Ox4. Forgetful at times. Lethargic throughout shift. VSS on RA. IVF infusing. Regular diet, good appetite. Droplet precautions. Social work and OT consulted. D/C pending, continue to monitor.
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
## 2017-03-12 NOTE — PHARMACY-ANTICOAGULATION SERVICE
Clinical Pharmacy - Warfarin Dosing Consult     Pharmacy has been consulted to manage this patient s warfarin therapy.  Indication: Atrial Fibrillation  Therapy Goal: INR 2-3  Warfarin Prior to Admission: Yes  Warfarin PTA Regimen: 5 mg daily  Significant drug interactions: ASA    INR   Date Value Ref Range Status   03/11/2017 2.04 (H) 0.86 - 1.14 Final       Recommend warfarin 5 mg today.  Pharmacy will monitor Rell Erazo daily and order warfarin doses to achieve specified goal.      Please contact pharmacy as soon as possible if the warfarin needs to be held for a procedure or if the warfarin goals change.
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
## 2017-03-12 NOTE — PROGRESS NOTES
"D: LEAH following for DC planning.  I: LEAH attempted to meet with pt's daughter. Bedside nurse reported she was here. When SW went to the room, she was gone. SW met with pt. He asked where he was, initially believing he was at home, before remembering he was in the hospital. Pt reports he lives at home with his wife. He helps her with transfers and cooks most of the meals. He gives her her medications and get her to the doctor. Per Dr Forte's request, LEAH printed off a form asking the [place] Department [employer] Public [employer] for a \"request for examination of .\" Dr Forte can fill this out or we can give it directly to the daughter. It's in SW office.   P: LEAH will follow for DC planning and full assessment.     MEENA Helton  [id number]     "
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
## 2017-03-12 NOTE — PROGRESS NOTES
" 03/12/17 1205   Quick Adds   Type of Visit Initial Occupational Therapy Evaluation   Living Environment   Lives With spouse   Living Arrangements house   Home Accessibility stairs to enter home;stairs within home;tub/shower is not walk in   Number of Stairs to Enter Home 2   Number of Stairs Within Home 13   Stair Railings at Home other (see comments)  (rail on basement steps, not sure which side)   Transportation Available car   Self-Care   Dominant Hand right   Usual Activity Tolerance good   Current Activity Tolerance fair   Regular Exercise other (see comments)   Equipment Currently Used at Home bath bench   Activity/Exercise/Self-Care Comment Pt reports \"being active\" but does not appear to have regular exercise program. Spouse is wc bound therefore would suspect have accommodations such as handicapped ht toilet however pt not clear w/clarification   Functional Level Prior   Ambulation 0-->independent   Transferring 0-->independent   Toileting 0-->independent   Bathing 0-->independent   Dressing 0-->independent   Eating 0-->independent   Communication 0-->understands/communicates without difficulty   Swallowing 0-->swallows foods/liquids without difficulty   Cognition 1 - attention or memory deficits   Fall history within last six months no   Prior Functional Level Comment Pt provided report and may not be totally reliable historian. He reports that he is indep all ADL, IADL including med and financial mgmt but that sometimes his daughter checks his meds. 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 He is caregiver for wc bound wife who falls frequently, assists her with all self-cares. He admits to some mild memory issues consistent with age. Pt does all household tasks, one daughter local assists at times. Other daughter in [place].   General Information   Onset of Illness/Injury or Date of Surgery - Date 03/11/17   Referring Physician Dr. French " [place]   Patient/Family Goals Statement return home   Additional Occupational Profile Info/Pertinent History of Current Problem 87yo male admitted with general weakness, fall out of bed and unable to get up requiring assist. Dx + Influenza A. Confusion noted in ED. OT ordered to perform cognitive assessment.    Precautions/Limitations fall precautions;other (see comments)  (Contact and Droplet )   Cognitive Status Examination   Orientation person;place   Level of Consciousness alert   Able to Follow Commands success, 2-step commands   Personal Safety (Cognitive) decreased insight to deficits   Memory impaired   Cognitive Comment Stated year 2007. Completed SLUMS and scored 15/30 which falls in dementia  range (1-20). See daily doc note for full report.    Visual Perception   Visual Perception Comments Denies deficits, however, pt's corrective lenses not at hospital and unable to read/see effectively to assess.    Pain Assessment   Patient Currently in Pain No   Range of Motion (ROM)   ROM Quick Adds No deficits were identified  (BUE)   Strength   Strength Comments L emily 4/5, R emily 3+/5   Mobility   Bed Mobility Comments Independent   Transfer Skill: Bed to Chair/Chair to Bed   Level of [place]: Bed to Chair contact guard   Physical Assist/Nonphysical Assist: Bed to Chair 1 person assist   Transfer Skill: Sit to Stand   Level of [place]: Sit/Stand contact guard   Physical Assist/Nonphysical Assist: Sit/Stand 1 person assist   Instrumental Activities of Daily Living (IADL)   Previous Responsibilities meal prep;housekeeping;laundry;shopping;medication management;finances;driving  (volunteer work)   Activities of Daily Living Analysis   ADL Comments ADLS not assessed as pt fatigued, feverish not feeling well and MD order was for cognitive assessment   General Therapy Interventions   Planned Therapy Interventions cognition;ADL retraining   Clinical Impression   Criteria for Skilled Therapeutic Interventions Met  "yes, treatment indicated   OT Diagnosis decreased ADL/IADL performance   Influenced by the following impairments impaired cognition, general and muscle weakness   Assessment of Occupational Performance 1-3 Performance Deficits   Identified Performance Deficits community mobility, home mgmt, caregiver skills   Clinical Decision Making (Complexity) Low complexity   Therapy Frequency daily   Predicted Duration of Therapy Intervention (days/wks) 2-3 days   Anticipated Discharge Disposition Transitional Care Facility;Home with Home Therapy  (pending PT balance assessment)   Risks and Benefits of Treatment have been explained. Yes   Patient, Family & other staff in agreement with plan of care Yes   Clinical Impression Comments Recommend pt have assistance with meds, finances and transportation pending further assessment.   [facility] TM \"6 Clicks\"   2016, Trustees of [facility], under license to [employer].  All rights reserved.   6 Clicks Short Forms Daily Activity Inpatient Short Form   [facility]  \"6 Clicks\" Daily Activity Inpatient Short Form   1. Putting on and taking off regular lower body clothing? 3 - A Little   2. Bathing (including washing, rinsing, drying)? 3 - A Little   3. Toileting, which includes using toilet, bedpan or urinal? 3 - A Little   4. Putting on and taking off regular upper body clothing? 3 - A Little   5. Taking care of personal grooming such as brushing teeth? 3 - A Little   6. Eating meals? 4 - None   Daily Activity Raw Score (Score out of 24.Lower scores equate to lower levels of function) 19   Total Evaluation Time   Total Evaluation Time (Minutes) 45     "

## 2017-03-13 ENCOUNTER — APPOINTMENT (OUTPATIENT)
Dept: OCCUPATIONAL THERAPY | Facility: CLINIC | Age: 82
DRG: 872 | End: 2017-03-13
Payer: MEDICARE

## 2017-03-13 ENCOUNTER — APPOINTMENT (OUTPATIENT)
Dept: PHYSICAL THERAPY | Facility: CLINIC | Age: 82
DRG: 872 | End: 2017-03-13
Attending: INTERNAL MEDICINE
Payer: MEDICARE

## 2017-03-13 LAB — INR PPP: 1.84 (ref 0.86–1.14)

## 2017-03-13 PROCEDURE — 40000133 ZZH STATISTIC OT WARD VISIT: Performed by: OCCUPATIONAL THERAPY ASSISTANT

## 2017-03-13 PROCEDURE — 40000193 ZZH STATISTIC PT WARD VISIT: Performed by: PHYSICAL THERAPIST

## 2017-03-13 PROCEDURE — 99233 SBSQ HOSP IP/OBS HIGH 50: CPT | Performed by: INTERNAL MEDICINE

## 2017-03-13 PROCEDURE — 85610 PROTHROMBIN TIME: CPT | Performed by: INTERNAL MEDICINE

## 2017-03-13 PROCEDURE — 97535 SELF CARE MNGMENT TRAINING: CPT | Mod: GO | Performed by: OCCUPATIONAL THERAPY ASSISTANT

## 2017-03-13 PROCEDURE — 97116 GAIT TRAINING THERAPY: CPT | Mod: GP | Performed by: PHYSICAL THERAPIST

## 2017-03-13 PROCEDURE — 99207 ZZC CDG-MDM COMPONENT: MEETS HIGH - UP CODED: CPT | Performed by: INTERNAL MEDICINE

## 2017-03-13 PROCEDURE — 12000000 ZZH R&B MED SURG/OB

## 2017-03-13 PROCEDURE — A9270 NON-COVERED ITEM OR SERVICE: HCPCS | Mod: GY | Performed by: INTERNAL MEDICINE

## 2017-03-13 PROCEDURE — 36415 COLL VENOUS BLD VENIPUNCTURE: CPT | Performed by: INTERNAL MEDICINE

## 2017-03-13 PROCEDURE — 97161 PT EVAL LOW COMPLEX 20 MIN: CPT | Mod: GP | Performed by: PHYSICAL THERAPIST

## 2017-03-13 PROCEDURE — 25000132 ZZH RX MED GY IP 250 OP 250 PS 637: Mod: GY | Performed by: INTERNAL MEDICINE

## 2017-03-13 RX ORDER — WARFARIN SODIUM 4 MG/1
4 TABLET ORAL
Status: COMPLETED | OUTPATIENT
Start: 2017-03-13 | End: 2017-03-13

## 2017-03-13 RX ADMIN — ACETAMINOPHEN 650 MG: 325 TABLET, FILM COATED ORAL at 01:53

## 2017-03-13 RX ADMIN — METOPROLOL SUCCINATE 25 MG: 25 TABLET, EXTENDED RELEASE ORAL at 09:06

## 2017-03-13 RX ADMIN — ATORVASTATIN CALCIUM 20 MG: 10 TABLET, FILM COATED ORAL at 09:06

## 2017-03-13 RX ADMIN — OSELTAMIVIR PHOSPHATE 30 MG: 30 CAPSULE ORAL at 20:27

## 2017-03-13 RX ADMIN — OSELTAMIVIR PHOSPHATE 30 MG: 30 CAPSULE ORAL at 09:06

## 2017-03-13 RX ADMIN — ASPIRIN 81 MG: 81 TABLET, COATED ORAL at 09:06

## 2017-03-13 RX ADMIN — WARFARIN SODIUM 4 MG: 4 TABLET ORAL at 17:35
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
## 2017-03-13 NOTE — PLAN OF CARE
Problem: Goal Outcome Summary  Goal: Goal Outcome Summary  OT: Per plan established by the Occupational Therapist, the recommended discharge location is pt have daughter set-up and supervise medication, oversee finances and provide transportation and assist with community mobility until pt is further evaluated by OP OT. OP OT can then address these potentially hazardous tasks and make referrals as needed including to  assessment programs such as [facility] (who go to pt's home) and/or [facility] as needed.  Pt completes overall transfers with SBA to CGA, SBA standing at sink for ADLS x 20 mins. Pt was able to state name, , current month/year, place however daughter was present in room and he was not able to state her name. Pt was not able to state name of spouse and other daughters name with daughter him what they were.
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
## 2017-03-13 NOTE — PLAN OF CARE
Problem: Goal Outcome Summary  Goal: Goal Outcome Summary  Outcome: No Change  Droplet/Enteric precaution. Patient A/O to person, place disoriented to time and forgetful. Febrile 99.3-99.5 given tylenol recheck normal other VSS on RA. Continues to have weak, nonproductive cough. On Tamiflu. Denies pain/dyscomfort. Disoriented to time and forgetful.  Continue to treat influenza. Need stool sample to r/o c-diff no bowel movement on night shift. Voiding adequately. Up with SBA. Tolerating regular diet.  SW following re: safe discharge. Will continue monitoring

## 2017-03-13 NOTE — PLAN OF CARE
Problem: Goal Outcome Summary  Goal: Goal Outcome Summary  Outcome: No Change  ([id number]) Continues to have weak, nonproductive cough. On Tamiflu. Low grade temp of 99.3. Denies pain/dyscomfort. Disoriented to time and forgetful.   Plan: Continue to treat influenza. Need stool sample to r/o c-diff. SW following re: safe discharge.

## 2017-03-13 NOTE — PLAN OF CARE
Problem: Goal Outcome Summary  Goal: Goal Outcome Summary  Outcome: Improving  Assumed care at 1530. Pt a/o. Forgetful. VSS. No pain. Up adlib. Tolerating reg diet. Ambulating in the hallways. Encouraged to use mask when ambulating out of the room. Pt verbalizes understanding. But sometimes tends to forget. Need reminder. Anticipating dcd home tomorrow.
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
## 2017-03-13 NOTE — PLAN OF CARE
Problem: Goal Outcome Summary  Goal: Goal Outcome Summary  Outcome: No Change  Pt alert but forgetful; at times, disoriented to time and situation. Low grade temp; otherwise, VSS. On RA. Infrequent nonproductive cough but no c/o sob. Continues on Tamiflu and droplet precautions.  Loose stool x2. No abdominal pain, nausea, or vomiting. Enteric isolation initiated/ Education provided. Up with SBA. Denies pain. Cognitive assessment completed by OT/ please refer to their note. PT eval pending for tomorrow. Will continue to monitor.

## 2017-03-13 NOTE — PLAN OF CARE
Problem: Goal Outcome Summary  Goal: Goal Outcome Summary  Outcome: Improving  Patient had VSS, denied any chest pain and SOB. On RA, sats in the 90's. LS are diminished, some fine crackles. Great appetite. INR was 1.84, on coumadin for afib. Patient walking around room independently. No stools, per MD d/c enteric isolation. Most likely d/c tomorrow home vs TCU. SW to update daughter, Selam. RN already updated her today.

## 2017-03-14 ENCOUNTER — APPOINTMENT (OUTPATIENT)
Dept: OCCUPATIONAL THERAPY | Facility: CLINIC | Age: 82
DRG: 872 | End: 2017-03-14
Payer: MEDICARE

## 2017-03-14 VITALS
SYSTOLIC BLOOD PRESSURE: 152 MMHG | OXYGEN SATURATION: 96 % | WEIGHT: 135.2 LBS | TEMPERATURE: 98.3 F | DIASTOLIC BLOOD PRESSURE: 84 MMHG | HEART RATE: 57 BPM | RESPIRATION RATE: 16 BRPM | HEIGHT: 71 IN | BODY MASS INDEX: 18.93 KG/M2

## 2017-03-14 LAB
ERYTHROCYTE [DISTWIDTH] IN BLOOD BY AUTOMATED COUNT: 13 % (ref 10–15)
HCT VFR BLD AUTO: 36.2 % (ref 40–53)
HGB BLD-MCNC: 12.7 G/DL (ref 13.3–17.7)
INR PPP: 1.71 (ref 0.86–1.14)
MCH RBC QN AUTO: 32.4 PG (ref 26.5–33)
MCHC RBC AUTO-ENTMCNC: 35.1 G/DL (ref 31.5–36.5)
MCV RBC AUTO: 92 FL (ref 78–100)
PLATELET # BLD AUTO: 136 10E9/L (ref 150–450)
RBC # BLD AUTO: 3.92 10E12/L (ref 4.4–5.9)
WBC # BLD AUTO: 3.9 10E9/L (ref 4–11)

## 2017-03-14 PROCEDURE — 25000132 ZZH RX MED GY IP 250 OP 250 PS 637: Mod: GY | Performed by: INTERNAL MEDICINE

## 2017-03-14 PROCEDURE — 40000133 ZZH STATISTIC OT WARD VISIT

## 2017-03-14 PROCEDURE — 36415 COLL VENOUS BLD VENIPUNCTURE: CPT | Performed by: INTERNAL MEDICINE

## 2017-03-14 PROCEDURE — A9270 NON-COVERED ITEM OR SERVICE: HCPCS | Mod: GY | Performed by: INTERNAL MEDICINE

## 2017-03-14 PROCEDURE — 85027 COMPLETE CBC AUTOMATED: CPT | Performed by: INTERNAL MEDICINE

## 2017-03-14 PROCEDURE — 85610 PROTHROMBIN TIME: CPT | Performed by: INTERNAL MEDICINE

## 2017-03-14 PROCEDURE — 99239 HOSP IP/OBS DSCHRG MGMT >30: CPT | Performed by: INTERNAL MEDICINE

## 2017-03-14 PROCEDURE — 97530 THERAPEUTIC ACTIVITIES: CPT | Mod: GO

## 2017-03-14 PROCEDURE — 97535 SELF CARE MNGMENT TRAINING: CPT | Mod: GO

## 2017-03-14 RX ORDER — OSELTAMIVIR PHOSPHATE 30 MG/1
30 CAPSULE ORAL 2 TIMES DAILY
Qty: 6 CAPSULE | Refills: 0 | Status: SHIPPED | OUTPATIENT
Start: 2017-03-14 | End: 2017-10-03

## 2017-03-14 RX ORDER — WARFARIN SODIUM 5 MG/1
5 TABLET ORAL
Status: DISCONTINUED | OUTPATIENT
Start: 2017-03-14 | End: 2017-03-14 | Stop reason: HOSPADM

## 2017-03-14 RX ADMIN — ACETAMINOPHEN 650 MG: 325 TABLET, FILM COATED ORAL at 02:13

## 2017-03-14 RX ADMIN — ATORVASTATIN CALCIUM 20 MG: 10 TABLET, FILM COATED ORAL at 09:05

## 2017-03-14 RX ADMIN — METOPROLOL SUCCINATE 25 MG: 25 TABLET, EXTENDED RELEASE ORAL at 09:05

## 2017-03-14 RX ADMIN — ASPIRIN 81 MG: 81 TABLET, COATED ORAL at 09:05

## 2017-03-14 RX ADMIN — OSELTAMIVIR PHOSPHATE 30 MG: 30 CAPSULE ORAL at 09:05

## 2017-03-14 NOTE — DISCHARGE SUMMARY
[facility]    Discharge Summary  Hospitalist    Date of Admission:  3/11/2017  Date of Discharge:  3/14/2017  Discharging Provider: Maico Rea MD  Date of Service (when I saw the patient): 03/14/17    Discharge Diagnoses   Influenza A with sepsis (fever to 102.6, WBC to 12.1 and hypotension to 80's systolic)  Anemia/ thrombocytopenia  Atrial fibrillation   CAD s/p 3 vessel CAB  Likely early dementia    History of Present Illness   Rell Erazo is an 88-year-old man with history of coronary artery disease, status post CABG x3 in 2012, history of bilateral strokes in 2013, atrial fibrillation on chronic anticoagulation who presents today with lethargy and fever and is found to have influenza A.    Hospital Course   Rell Erazo was admitted on 3/11/2017.  The following problems were addressed during his hospitalization:    Influenza A with sepsis (fever to 102.6, WBC to 12.1 and hypotension to 80's systolic)  Mr. Erazo presented to the hospital with confusion and upper respiratory symptoms. The day of presentation he was found on the floor at his home with loss of bowel/ bladder control. Found on admission to have temp to 102.6 and influenza A. CXR was negative, no other source of infection was found. He was started on tamiflu, will have 3 days further after discharge. At the time of d/c he is doing well. Appears at baseline.     Anemia/ thrombocytopenia  Stable/ improving at the time of discharge. Routine monitoring as outpatient.     Atrial fibrillation   Currently in NSR, on metoprolol and warfarin as outpatient. Has appointment with warfarin clinic on 3/16 through the heart center at [place].     CAD s/p 3 vessel CAB  No changes to outpatient regimen and continue at time of discharge.     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 Occasional confusion but largely functional. Discussed with family/ dtr Selam and patient does well at home and is  watched carefully. Selam contacts daily and makes sure meds have been taken. They are watching closely to ensure that he is safe to drive. Will have home health RN come and asses home safety as well as OT as outpatient to assess home safety, referral regarding driving, etc. Daughter Selam and patient were fine with this arrangement at discharge.     Maico Rea M.D.  Hospitalist  Pager [phone]    Significant Results and Procedures   CXR  CT head without contrast    Pending Results   None    Code Status   DNR / DNI       Primary Care Physician   No primary care provider on file.    Physical Exam   Temp: 98.3  F (36.8  C) Temp src: Oral BP: 152/84 Pulse: 57 Heart Rate: 64 Resp: 16 SpO2: 96 % O2 Device: None (Room air)    Vitals:    03/12/17 0500 03/13/17 0417 03/14/17 0549   Weight: 61.4 kg (135 lb 4.8 oz) 63.5 kg (139 lb 14.4 oz) 61.3 kg (135 lb 3.2 oz)     Vital Signs with Ranges  Temp:  [97.8  F (36.6  C)-98.5  F (36.9  C)] 98.3  F (36.8  C)  Pulse:  [57] 57  Heart Rate:  [57-65] 64  Resp:  [16-18] 16  BP: (124-152)/(74-84) 152/84  SpO2:  [96 %-97 %] 96 %  I/O last 3 completed shifts:  In: 440 [P.O.:440]  Out: 975 [Urine:975]    Constitutional: Alert, oriented, no acute distress  Respiratory: Lungs clear to auscultation bilaterally, no wheezes, no crackles  Cardiovascular: Regular rate and rhythm, no murmurs  GI: Soft, non-tender, non-disteneded, good bowel sounds  Skin/Integumen: No erythema, cyanosis or edema  Other:      Discharge Disposition   Discharged to home  Condition at discharge: Stable    Consultations This Hospital Stay   PHARMACY TO DOSE WARFARIN  SOCIAL WORK IP CONSULT  OCCUPATIONAL THERAPY ADULT IP CONSULT  PHYSICAL THERAPY ADULT IP CONSULT    Time Spent on this Encounter   IMaico, personally saw the patient today and spent greater than 30 minutes discharging this patient.    Discharge Orders     Home care nursing referral     Home Care OT Referral for Hospital  Discharge     Reason for your hospital stay   You were hospitalized secondary to influenza infection.     Follow-up and recommended labs and tests    Follow up with primary care provider, Dr. Robert through [facility], within 7-14 days for hospital follow- up.  No follow up labs or test are needed.     Activity   Your activity upon discharge: activity as tolerated     When to contact your care team   Call your primary doctor if you have any of the following: temperature greater than 100.5,  increased shortness of breath or increased pain.     MD face to face encounter   Documentation of Face to Face and Certification for Home Health Services    I certify that patient: Rell Erazo is under my care and that I, or a nurse practitioner or physician's assistant working with me, had a face-to-face encounter that meets the physician face-to-face encounter requirements with this patient on: 3/14/2017.    This encounter with the patient was in whole, or in part, for the following medical condition, which is the primary reason for home health care: dementia.    I certify that, based on my findings, the following services are medically necessary home health services: Nursing and Occupational Therapy.    My clinical findings support the need for the above services because: Nurse is needed: To provide assessment and oversight required in the home to assure adherence to the medical plan due to: dementia.. and Occupational Therapy Services are needed to assess and treat cognitive ability and address ADL safety due to impairment secondary to dementia.    Further, I certify that my clinical findings support that this patient is homebound (i.e. absences from home require considerable and taxing effort and are for medical reasons or [facility] services or infrequently or of short duration when for other reasons) because: Requires assistance of another person or specialized equipment to access medical services because patient: Is  prone to wander/get lost without assistance...    Based on the above findings. I certify that this patient is confined to the home and needs intermittent skilled nursing care, physical therapy and/or speech therapy.  The patient is under my care, and I have initiated the establishment of the plan of care.  This patient will be followed by a physician who will periodically review the plan of care.  Physician/Provider to provide follow up care: No primary care provider on file.    Attending hospital physician (the Medicare certified [facility] provider): Maico Rea  Physician Signature: See electronic signature associated with these discharge orders.  Date: 3/14/2017     DNR/DNI     Diet   Follow this diet upon discharge: Orders Placed This Encounter     Combination Diet Regular Diet Adult, Safe Tray - with utensils       Discharge Medications   Current Discharge Medication List      START taking these medications    Details   oseltamivir (TAMIFLU) 30 MG capsule Take 1 capsule (30 mg) by mouth 2 times daily  Qty: 6 capsule, Refills: 0    Associated Diagnoses: Influenza A         CONTINUE these medications which have NOT CHANGED    Details   multivitamin, therapeutic with minerals (THERA-VIT-M) TABS Take 1 tablet by mouth daily      olopatadine HCl (PATADAY) 0.2 % SOLN Place 1 drop into both eyes daily as needed      Atorvastatin Calcium (LIPITOR PO) Take 20 mg by mouth daily      warfarin (COUMADIN) 5 MG tablet Take 1 tablet (5 mg) by mouth daily  Qty: 30 tablet, Refills: 0      metoprolol (TOPROL-XL) 25 MG 24 hr tablet Take 1 tablet (25 mg) by mouth daily  Qty: 30 tablet, Refills: 1      ASPIRIN EC PO Take 81 mg by mouth daily           Allergies   No Known Allergies  Data   Most Recent 3 CBC's:  Recent Labs   Lab Test  03/14/17   0756  03/12/17   0910  03/11/17   1525   WBC  3.9*  8.4  12.1*   HGB  12.7*  11.5*  14.3   MCV  92  94  94   PLT  136*  117*  146*      Most Recent 3 BMP's:  Recent Labs   Lab Test   03/12/17   0910  03/11/17   1525  10/24/16   1209   NA  142  137  139   POTASSIUM  4.1  3.8  4.2   CHLORIDE  109  102  105   CO2  26  30  29   BUN  19  16  26   CR  1.07  1.11  1.28*   ANIONGAP  7  5  5   MING  7.7*  8.6  9.1   GLC  86  135*  158*     Most Recent 2 LFT's:  Recent Labs   Lab Test  03/11/17   1525   AST  47*   ALT  30   ALKPHOS  78   BILITOTAL  1.2     Most Recent INR's and Anticoagulation Dosing History:  Anticoagulation Dose History     Recent Dosing and Labs Latest Ref Rng & Units 3/11/2017 3/12/2017 3/13/2017 3/14/2017    Warfarin 2 mg - - 2 mg - -    Warfarin 4 mg - - - 4 mg -    Warfarin 5 mg - 5 mg - - -    INR 0.86 - 1.14 2.04(H) 2.42(H) 1.84(H) 1.71(H)        Most Recent 3 Troponin's:  Recent Labs   Lab Test  03/11/17   1525  10/24/16   1209  05/17/13   1505   TROPI  <0.015  The 99th percentile for upper reference range is 0.045 ug/L.  Troponin values in   the range of 0.045 - 0.120 ug/L may be associated with risks of adverse   clinical events.    <0.015  The 99th percentile for upper reference range is 0.045 ug/L.  Troponin values in   the range of 0.045 - 0.120 ug/L may be associated with risks of adverse   clinical events.    <0.012     Most Recent Cholesterol Panel:  Recent Labs   Lab Test  05/17/13   1505   CHOL  109   LDL  50   HDL  47   TRIG  65     Most Recent 6 Bacteria Isolates From Any Culture (See EPIC Reports for Culture Details):No lab results found.  Most Recent TSH, T4 and A1c Labs:  Recent Labs   Lab Test  03/11/17   1525   TSH  0.65     Results for orders placed or performed during the hospital encounter of 03/11/17   Head CT w/o contrast    Narrative    CT HEAD WITHOUT CONTRAST  3/11/2017 3:53 PM    HISTORY: Fall. The patient is on a blood thinner.    COMPARISON: A CT on 11/23/2007.    TECHNIQUE: Routine transverse CT images of the head without  intravenous contrast. Radiation dose for this scan was reduced using  automated exposure control, adjustment of the mA and/or kV  according  to patient size, or iterative reconstruction technique.    FINDINGS: The sulci and ventricles are normal for the patient's age.  Areas of low attenuation are present in the white matter of the  cerebral hemispheres that are consistent with small vessel ischemic  disease.     There is no evidence of intracranial hemorrhage, mass, acute infarct  or anomaly. The visualized portions of the sinuses and mastoids appear  normal. No fracture is seen. There is been no significant change since  the previous CT.      Impression    IMPRESSION: Age related changes and white matter changes consistent  with small vessel ischemic disease. No acute abnormality is seen.    ERIKA JACOBS MD   XR Chest 2 Views    Narrative    CHEST TWO VIEW   3/11/2017 3:57 PM     HISTORY: Cough.    COMPARISON: 10/24/2016      Impression    IMPRESSION: No acute cardiopulmonary disease identified. The  previously suggested nodule at the right upper lung is very difficult  to visualize on this exam. It may not be fully characterized, or  perhaps partially obscured by overlying ribs. Nevertheless, recommend  nonurgent CT chest to assess if there is a significant pulmonary  nodule in this region.    VIRIDIANA YARBROUGH MD

## 2017-03-14 NOTE — PROGRESS NOTES
Patient had VSS, denied any chest pain and SOB. Patient on RA, sats in the high 90's. Patient exhibits signs of forgetfulness and short term memory loss. Per daughter, ok to d/c to home. Patient does walk very well independently in room. INR today was 1.71, patient will continue home regimen of warfarin and will have INR drawn on Thursday. Patient is discharging to home, daughter will transport. IV access taken out and tip intact. All discharge instructions explained to patient and daughter and all questions answered.

## 2017-03-14 NOTE — PROGRESS NOTES
Late Entry  Care navigation RN from [facility] called to state she has spoken with patient's daughter, Selam about the home care. Selam states patient will not be homebound and therefore he won't qualify for the home care services.

## 2017-03-14 NOTE — PROGRESS NOTES
"LEAH  D) Patient's daughter, Selam left a message requesting help filling out a \"letter of financial incapacity\". In her message she also noted they do have additional private pay help at home for patient.  !) Per MD, patient will likely be discharged today with home care.  Called Selam and noted letters like this are generally completed by the community MD.  Discussed home care options and she would like to use [facility] care as patient's primary MD is actually Jay Robert with [facility]. Selam did not want SW to speak with patient about the home care. Noted MD does plan to see her this afternoon prior to discharge and they can discuss this together. She plans to be at the hospital at 1300.  P) Will make the referral to [facility] once orders are written.    Addendum  Called Charlene in Care Navigation at [facility] to arrange home RN and OT. Discharge orders and a face sheet were faxed.  "

## 2017-03-14 NOTE — PLAN OF CARE
Problem: Goal Outcome Summary  Goal: Goal Outcome Summary  Occupational Therapy Discharge Summary     Reason for therapy discharge:    Discharged to home.     Progress towards therapy goal(s). [facility] for goal details.  Goals partially met.  Barriers to achieving goals:   discharge from facility.     Therapy recommendation(s):    Continued therapy is recommended.  Rationale/Recommendations:  to maximize I in ADLs.

## 2017-03-14 NOTE — PLAN OF CARE
Problem: Goal Outcome Summary  Goal: Goal Outcome Summary  PT: Attempted to see Pt this morning x 1. Pt had just received his breakfast. Unable to see Pt at this time.     Pt discharged prior to PT returning. PT goals not met.    Physical Therapy Discharge Summary    Reason for therapy discharge:    Discharged to home.    Progress towards therapy goal(s). See goals on Care Plan [facility] health [facility] for goal details.  Goals not met.  Barriers to achieving goals:   discharge from facility.    Therapy recommendation(s):    Pt discharged before HEP could be issued or mobility progressed. Pt would benefit from home PT to progress mobility to independence.

## 2017-03-14 NOTE — PLAN OF CARE
Problem: Goal Outcome Summary  Goal: Goal Outcome Summary  Outcome: Improving  Pt A/O, forgetful. However, pt would be quite confused when first waking up throughout the night. Had to be reminded where he was multiple times. SBA, fall risk. VSS on RA. Complaints of neck pain, Tylenol given with relief. Droplet isolation. Plan for D/C home today.

## 2017-03-14 NOTE — PLAN OF CARE
"Problem: Goal Outcome Summary  Goal: Goal Outcome Summary  OT: Patient I sit <> stand, to don shoes, to ambulate <> BR without AD, MOD I <> toilet using grab bar and I for grooming and hygiene at sink. Patient states that he doesn't feel that his strength is \"all the way back\". Patient currently limited by cognitive deficits and deconditioning. Recommend: have daughter set-up and supervise medication management, oversee finances and provide transportation and assist with community mobility until patient is further evaluated by OP OT. OP OT can then address these potentially hazardous tasks and make referrals as needed including to  assessment programs such as [facility] (who go to patient's home) and/or [facility] as needed.  Patient was agreeable to this plan.       "

## 2017-03-14 NOTE — PLAN OF CARE
Problem: Goal Outcome Summary  Goal: Goal Outcome Summary  Outcome: Completed Date Met:  03/14/17  Patient had VSS, denied any chest pain and SOB. Patient on RA, sats in the high 90's. Patient exhibits signs of forgetfulness and short term memory loss. Per daughter, ok to d/c to home. Patient does walk very well independently in room. INR today was 1.71, patient will continue home regimen of warfarin and will have INR drawn on Thursday. Patient is discharging to home, daughter will transport. IV access taken out and tip intact. All discharge instructions explained to patient and daughter and all questions answered.

## 2017-10-03 ENCOUNTER — HOSPITAL ENCOUNTER (INPATIENT)
Facility: CLINIC | Age: 82
LOS: 4 days | Discharge: NURSING, REHAB OR LONG-TERM CARE | DRG: 084 | End: 2017-10-07
Attending: EMERGENCY MEDICINE | Admitting: INTERNAL MEDICINE
Payer: MEDICARE

## 2017-10-03 ENCOUNTER — APPOINTMENT (OUTPATIENT)
Dept: CT IMAGING | Facility: CLINIC | Age: 82
DRG: 084 | End: 2017-10-03
Attending: EMERGENCY MEDICINE
Payer: MEDICARE

## 2017-10-03 DIAGNOSIS — S05.11XA PERIORBITAL CONTUSION OF RIGHT EYE, INITIAL ENCOUNTER: ICD-10-CM

## 2017-10-03 DIAGNOSIS — I61.5 INTRAVENTRICULAR HEMORRHAGE (H): ICD-10-CM

## 2017-10-03 DIAGNOSIS — I48.20 CHRONIC ATRIAL FIBRILLATION (H): Primary | ICD-10-CM

## 2017-10-03 PROBLEM — I61.9 ICH (INTRACEREBRAL HEMORRHAGE) (H): Status: ACTIVE | Noted: 2017-10-03

## 2017-10-03 LAB
ABO + RH BLD: NORMAL
ABO + RH BLD: NORMAL
ALBUMIN SERPL-MCNC: 3.9 G/DL (ref 3.4–5)
ALP SERPL-CCNC: 103 U/L (ref 40–150)
ALT SERPL W P-5'-P-CCNC: 39 U/L (ref 0–70)
ANION GAP SERPL CALCULATED.3IONS-SCNC: 7 MMOL/L (ref 3–14)
AST SERPL W P-5'-P-CCNC: 51 U/L (ref 0–45)
BASOPHILS # BLD AUTO: 0 10E9/L (ref 0–0.2)
BASOPHILS NFR BLD AUTO: 0.2 %
BILIRUB SERPL-MCNC: 0.8 MG/DL (ref 0.2–1.3)
BLD GP AB SCN SERPL QL: NORMAL
BLOOD BANK CMNT PATIENT-IMP: NORMAL
BUN SERPL-MCNC: 18 MG/DL (ref 7–30)
CALCIUM SERPL-MCNC: 8.7 MG/DL (ref 8.5–10.1)
CHLORIDE SERPL-SCNC: 105 MMOL/L (ref 94–109)
CO2 SERPL-SCNC: 27 MMOL/L (ref 20–32)
CREAT SERPL-MCNC: 1.07 MG/DL (ref 0.66–1.25)
DIFFERENTIAL METHOD BLD: ABNORMAL
EOSINOPHIL # BLD AUTO: 0 10E9/L (ref 0–0.7)
EOSINOPHIL NFR BLD AUTO: 0.6 %
ERYTHROCYTE [DISTWIDTH] IN BLOOD BY AUTOMATED COUNT: 12.9 % (ref 10–15)
GFR SERPL CREATININE-BSD FRML MDRD: 65 ML/MIN/1.7M2
GLUCOSE BLDC GLUCOMTR-MCNC: 103 MG/DL (ref 70–99)
GLUCOSE BLDC GLUCOMTR-MCNC: 144 MG/DL (ref 70–99)
GLUCOSE BLDC GLUCOMTR-MCNC: 82 MG/DL (ref 70–99)
GLUCOSE SERPL-MCNC: 99 MG/DL (ref 70–99)
HCT VFR BLD AUTO: 42.4 % (ref 40–53)
HGB BLD-MCNC: 15.1 G/DL (ref 13.3–17.7)
IMM GRANULOCYTES # BLD: 0 10E9/L (ref 0–0.4)
IMM GRANULOCYTES NFR BLD: 0.2 %
INR PPP: 1.15 (ref 0.86–1.14)
INR PPP: 2.6 (ref 0.86–1.14)
INTERPRETATION ECG - MUSE: NORMAL
LYMPHOCYTES # BLD AUTO: 0.6 10E9/L (ref 0.8–5.3)
LYMPHOCYTES NFR BLD AUTO: 9.1 %
MCH RBC QN AUTO: 33.1 PG (ref 26.5–33)
MCHC RBC AUTO-ENTMCNC: 35.6 G/DL (ref 31.5–36.5)
MCV RBC AUTO: 93 FL (ref 78–100)
MONOCYTES # BLD AUTO: 0.7 10E9/L (ref 0–1.3)
MONOCYTES NFR BLD AUTO: 10.8 %
NEUTROPHILS # BLD AUTO: 5 10E9/L (ref 1.6–8.3)
NEUTROPHILS NFR BLD AUTO: 79.1 %
NRBC # BLD AUTO: 0 10*3/UL
NRBC BLD AUTO-RTO: 0 /100
PLATELET # BLD AUTO: 201 10E9/L (ref 150–450)
POTASSIUM SERPL-SCNC: 4.3 MMOL/L (ref 3.4–5.3)
PROT SERPL-MCNC: 7.7 G/DL (ref 6.8–8.8)
RADIOLOGIST FLAGS: ABNORMAL
RBC # BLD AUTO: 4.56 10E12/L (ref 4.4–5.9)
SODIUM SERPL-SCNC: 139 MMOL/L (ref 133–144)
SPECIMEN EXP DATE BLD: NORMAL
TROPONIN I SERPL-MCNC: <0.015 UG/L (ref 0–0.04)
WBC # BLD AUTO: 6.4 10E9/L (ref 4–11)

## 2017-10-03 PROCEDURE — 93005 ELECTROCARDIOGRAM TRACING: CPT

## 2017-10-03 PROCEDURE — 86900 BLOOD TYPING SEROLOGIC ABO: CPT | Performed by: EMERGENCY MEDICINE

## 2017-10-03 PROCEDURE — 00000146 ZZHCL STATISTIC GLUCOSE BY METER IP

## 2017-10-03 PROCEDURE — 99223 1ST HOSP IP/OBS HIGH 75: CPT | Mod: AI | Performed by: INTERNAL MEDICINE

## 2017-10-03 PROCEDURE — 40000895 ZZH STATISTIC SLP IP EVAL DEFER: Performed by: SPEECH-LANGUAGE PATHOLOGIST

## 2017-10-03 PROCEDURE — 25000128 H RX IP 250 OP 636: Performed by: INTERNAL MEDICINE

## 2017-10-03 PROCEDURE — 70486 CT MAXILLOFACIAL W/O DYE: CPT

## 2017-10-03 PROCEDURE — 96365 THER/PROPH/DIAG IV INF INIT: CPT

## 2017-10-03 PROCEDURE — 99222 1ST HOSP IP/OBS MODERATE 55: CPT | Performed by: PHYSICIAN ASSISTANT

## 2017-10-03 PROCEDURE — 85610 PROTHROMBIN TIME: CPT | Performed by: EMERGENCY MEDICINE

## 2017-10-03 PROCEDURE — 80053 COMPREHEN METABOLIC PANEL: CPT | Performed by: EMERGENCY MEDICINE

## 2017-10-03 PROCEDURE — 25000555 ZZHC RX FACTOR IP 250 OP 636

## 2017-10-03 PROCEDURE — 84484 ASSAY OF TROPONIN QUANT: CPT | Performed by: EMERGENCY MEDICINE

## 2017-10-03 PROCEDURE — 85025 COMPLETE CBC W/AUTO DIFF WBC: CPT | Performed by: EMERGENCY MEDICINE

## 2017-10-03 PROCEDURE — 30283B1 TRANSFUSION OF NONAUTOLOGOUS 4-FACTOR PROTHROMBIN COMPLEX CONCENTRATE INTO VEIN, PERCUTANEOUS APPROACH: ICD-10-PCS | Performed by: EMERGENCY MEDICINE

## 2017-10-03 PROCEDURE — 70450 CT HEAD/BRAIN W/O DYE: CPT

## 2017-10-03 PROCEDURE — C9132 KCENTRA, PER I.U.: HCPCS

## 2017-10-03 PROCEDURE — 99285 EMERGENCY DEPT VISIT HI MDM: CPT | Mod: 25

## 2017-10-03 PROCEDURE — 20000003 ZZH R&B ICU

## 2017-10-03 PROCEDURE — 86901 BLOOD TYPING SEROLOGIC RH(D): CPT | Performed by: EMERGENCY MEDICINE

## 2017-10-03 PROCEDURE — 36415 COLL VENOUS BLD VENIPUNCTURE: CPT | Performed by: EMERGENCY MEDICINE

## 2017-10-03 PROCEDURE — A9270 NON-COVERED ITEM OR SERVICE: HCPCS | Mod: GY | Performed by: INTERNAL MEDICINE

## 2017-10-03 PROCEDURE — 25000132 ZZH RX MED GY IP 250 OP 250 PS 637: Mod: GY | Performed by: INTERNAL MEDICINE

## 2017-10-03 PROCEDURE — 86850 RBC ANTIBODY SCREEN: CPT | Performed by: EMERGENCY MEDICINE

## 2017-10-03 PROCEDURE — 25000128 H RX IP 250 OP 636: Performed by: EMERGENCY MEDICINE

## 2017-10-03 RX ORDER — POTASSIUM CL/LIDO/0.9 % NACL 10MEQ/0.1L
10 INTRAVENOUS SOLUTION, PIGGYBACK (ML) INTRAVENOUS
Status: DISCONTINUED | OUTPATIENT
Start: 2017-10-03 | End: 2017-10-07 | Stop reason: HOSPADM

## 2017-10-03 RX ORDER — ONDANSETRON 2 MG/ML
4 INJECTION INTRAMUSCULAR; INTRAVENOUS EVERY 6 HOURS PRN
Status: DISCONTINUED | OUTPATIENT
Start: 2017-10-03 | End: 2017-10-07 | Stop reason: HOSPADM

## 2017-10-03 RX ORDER — POTASSIUM CHLORIDE 1500 MG/1
20-40 TABLET, EXTENDED RELEASE ORAL
Status: DISCONTINUED | OUTPATIENT
Start: 2017-10-03 | End: 2017-10-07 | Stop reason: HOSPADM

## 2017-10-03 RX ORDER — POTASSIUM CHLORIDE 7.45 MG/ML
10 INJECTION INTRAVENOUS
Status: DISCONTINUED | OUTPATIENT
Start: 2017-10-03 | End: 2017-10-07 | Stop reason: HOSPADM

## 2017-10-03 RX ORDER — POTASSIUM CHLORIDE 29.8 MG/ML
20 INJECTION INTRAVENOUS
Status: DISCONTINUED | OUTPATIENT
Start: 2017-10-03 | End: 2017-10-07 | Stop reason: HOSPADM

## 2017-10-03 RX ORDER — POTASSIUM CHLORIDE 1.5 G/1.58G
20-40 POWDER, FOR SOLUTION ORAL
Status: DISCONTINUED | OUTPATIENT
Start: 2017-10-03 | End: 2017-10-07 | Stop reason: HOSPADM

## 2017-10-03 RX ORDER — ONDANSETRON 4 MG/1
4 TABLET, ORALLY DISINTEGRATING ORAL EVERY 6 HOURS PRN
Status: DISCONTINUED | OUTPATIENT
Start: 2017-10-03 | End: 2017-10-07 | Stop reason: HOSPADM

## 2017-10-03 RX ORDER — AMOXICILLIN 250 MG
1-2 CAPSULE ORAL 2 TIMES DAILY PRN
Status: DISCONTINUED | OUTPATIENT
Start: 2017-10-03 | End: 2017-10-07 | Stop reason: HOSPADM

## 2017-10-03 RX ORDER — ACETAMINOPHEN 325 MG/1
650 TABLET ORAL EVERY 4 HOURS PRN
Status: DISCONTINUED | OUTPATIENT
Start: 2017-10-03 | End: 2017-10-07 | Stop reason: HOSPADM

## 2017-10-03 RX ORDER — HYDRALAZINE HYDROCHLORIDE 20 MG/ML
10 INJECTION INTRAMUSCULAR; INTRAVENOUS EVERY 4 HOURS PRN
Status: DISCONTINUED | OUTPATIENT
Start: 2017-10-03 | End: 2017-10-07 | Stop reason: HOSPADM

## 2017-10-03 RX ORDER — BISACODYL 10 MG
10 SUPPOSITORY, RECTAL RECTAL DAILY PRN
Status: DISCONTINUED | OUTPATIENT
Start: 2017-10-03 | End: 2017-10-07 | Stop reason: HOSPADM

## 2017-10-03 RX ORDER — LABETALOL HYDROCHLORIDE 5 MG/ML
10 INJECTION, SOLUTION INTRAVENOUS
Status: DISCONTINUED | OUTPATIENT
Start: 2017-10-03 | End: 2017-10-07 | Stop reason: HOSPADM

## 2017-10-03 RX ORDER — SODIUM CHLORIDE 9 MG/ML
INJECTION, SOLUTION INTRAVENOUS CONTINUOUS
Status: DISCONTINUED | OUTPATIENT
Start: 2017-10-03 | End: 2017-10-03

## 2017-10-03 RX ORDER — ATORVASTATIN CALCIUM 20 MG/1
20 TABLET, FILM COATED ORAL DAILY
Status: DISCONTINUED | OUTPATIENT
Start: 2017-10-03 | End: 2017-10-07 | Stop reason: HOSPADM

## 2017-10-03 RX ORDER — NALOXONE HYDROCHLORIDE 0.4 MG/ML
.1-.4 INJECTION, SOLUTION INTRAMUSCULAR; INTRAVENOUS; SUBCUTANEOUS
Status: DISCONTINUED | OUTPATIENT
Start: 2017-10-03 | End: 2017-10-07 | Stop reason: HOSPADM

## 2017-10-03 RX ORDER — POLYETHYLENE GLYCOL 3350 17 G/17G
17 POWDER, FOR SOLUTION ORAL DAILY PRN
Status: DISCONTINUED | OUTPATIENT
Start: 2017-10-03 | End: 2017-10-07 | Stop reason: HOSPADM

## 2017-10-03 RX ORDER — LIDOCAINE 40 MG/G
CREAM TOPICAL
Status: DISCONTINUED | OUTPATIENT
Start: 2017-10-03 | End: 2017-10-07 | Stop reason: HOSPADM

## 2017-10-03 RX ADMIN — HYDRALAZINE HYDROCHLORIDE 10 MG: 20 INJECTION INTRAMUSCULAR; INTRAVENOUS at 13:15

## 2017-10-03 RX ADMIN — ATORVASTATIN CALCIUM 20 MG: 20 TABLET, FILM COATED ORAL at 13:15

## 2017-10-03 RX ADMIN — SODIUM CHLORIDE: 9 INJECTION, SOLUTION INTRAVENOUS at 11:15

## 2017-10-03 RX ADMIN — PHYTONADIONE 10 MG: 10 INJECTION, EMULSION INTRAMUSCULAR; INTRAVENOUS; SUBCUTANEOUS at 10:09

## 2017-10-03 ASSESSMENT — ACTIVITIES OF DAILY LIVING (ADL)
BATHING: 0-->INDEPENDENT
RETIRED_EATING: 0-->INDEPENDENT
SWALLOWING: 0-->SWALLOWS FOODS/LIQUIDS WITHOUT DIFFICULTY
NUMBER_OF_TIMES_PATIENT_HAS_FALLEN_WITHIN_LAST_SIX_MONTHS: 1
RETIRED_COMMUNICATION: 0-->UNDERSTANDS/COMMUNICATES WITHOUT DIFFICULTY
TRANSFERRING: 0-->INDEPENDENT
AMBULATION: 1-->ASSISTIVE EQUIPMENT
FALL_HISTORY_WITHIN_LAST_SIX_MONTHS: YES
TOILETING: 0-->INDEPENDENT
COGNITION: 0 - NO COGNITION ISSUES REPORTED
DRESS: 0-->INDEPENDENT
WHICH_OF_THE_ABOVE_FUNCTIONAL_RISKS_HAD_A_RECENT_ONSET_OR_CHANGE?: AMBULATION

## 2017-10-03 ASSESSMENT — ENCOUNTER SYMPTOMS
NECK PAIN: 0
EYE PAIN: 0
COLOR CHANGE: 1
BACK PAIN: 0
NAUSEA: 0
SHORTNESS OF BREATH: 0
WOUND: 1
HEADACHES: 0

## 2017-10-03 NOTE — ED PROVIDER NOTES
History     Chief Complaint:  Bleeding/Bruising    History limited secondary to patient's memory deficit.    HPI   Rell Erazo is an 89 year old male on Coumadin with a medical history significant for atrial fibrillation and CABG x3, among others, who presents to the emergency department today for evaluation of bleeding and bruising. The patient's daughter, Selam, reports that the patient has new bruising around his right eye and abrasions on his bilateral elbows that were not present when she saw him yesterday at 1600. She notes that the patient has a memory deficit, and the patient is unable to recall if he fell or hit his head recently. The patient denies headache, nausea, facial pain, pain in his right eye, vision changes in his right eye, chest pain, shortness of breath, back pain, or neck pain. The patient's daughter states that the patient reported right shoulder pain this morning, but the patient currently denies any shoulder pain. She also states that the patient was spitting up some blood this morning. Of note, the patient lives alone and occasionally uses a cane. The daughter reports that the patient has physical therapy three times weekly and has assistance come to his home three times weekly as well. She notes that his insurance does not cover in-home medical care.    Allergies:  No Known Drug Allergies     Medications:    Lipitor  Coumadin  Metoprolol  Aspirin  Pataday    Past Medical History:    Coronary artery disease  Cerebral infarction  Hyperlipidemia  Atrial fibrillation    Past Surgical History:    Cardiac surgery, CABG x3    Family History:    History reviewed. No pertinent family history.    Social History:  The patient was accompanied to the ED by his daughter.  Smoking Status: Never Smoker  Alcohol Use: Positive  Marital Status:       Review of Systems   HENT:        Negative for facial pain.   Eyes: Negative for pain and visual disturbance.   Respiratory: Negative for  "shortness of breath.    Cardiovascular: Negative for chest pain.   Gastrointestinal: Negative for nausea.        Positive for spitting up blood.   Musculoskeletal: Negative for back pain and neck pain.        Negative for shoulder pain.   Skin: Positive for color change (right periorbital ecchymosis) and wound (abrasions of bilateral elbows).   Neurological: Negative for headaches.   All other systems reviewed and are negative.    Physical Exam     Patient Vitals for the past 24 hrs:   BP Temp Temp src Pulse Heart Rate Resp SpO2 Height Weight   10/03/17 1045 149/77 98  F (36.7  C) Oral - 75 22 97 % - -   10/03/17 1030 153/80 - - - 73 13 98 % - -   10/03/17 1000 127/74 - - - 60 13 96 % - -   10/03/17 0930 118/66 - - - 63 16 95 % - -   10/03/17 0915 137/78 - - - - - - - -   10/03/17 0900 - - - - - - - 1.778 m (5' 10\") 65.8 kg (145 lb)   10/03/17 0857 156/80 98.5  F (36.9  C) Oral 94 - 16 96 % - -     Physical Exam  Nursing note and vitals reviewed.  Constitutional:  Awake and alert. Cooperative.   HENT:   Nose:    Nose normal.   Mouth/Throat:   Mucous membranes are normal.   Eyes:    Conjunctivae normal and EOM are normal.      Pupils are equal, round, and reactive to light.   Neck:    Trachea normal.   Cardiovascular:  Tachycardic, regular rhythm, normal heart sounds and normal pulses. No murmur heard.  Pulmonary/Chest:  Effort normal and breath sounds normal.   Abdominal:   Soft. Normal appearance and bowel sounds are normal.      There is no tenderness.      There is no rebound and no CVA tenderness.   Musculoskeletal:  Large amount of ecchymosis and swelling to right periorbital region which is slightly tender to palpation. Abrasions to bilateral elbows, but extremities otherwise atraumatic x 4. Right shoulder normal in appearance with full range of motion and no tenderness to palpation.  Lymphadenopathy:  No cervical adenopathy.   Neurological:   Alert but demented. Normal strength.      No cranial nerve deficit " or sensory deficit. GCS eye subscore is 4. GCS verbal subscore is 5. GCS motor subscore is 6.   Skin:    Abrasions as above. No rash noted.   Psychiatric:   Normal mood and affect.    Emergency Department Course     ECG:  ECG taken at 0905, ECG read at 0908  Sinus rhythm with premature atrial complexes with aberrant conduction  Prolonged QT  Abnormal ECG  Rate 81 bpm. NV interval 142 ms. QRS duration 76 ms. QT/QTc 416/483 ms. P-R-T axes 49 21 58.    Imaging:  Radiology findings were communicated with the patient and his daughter who voiced understanding of the findings.    CT Head w/o Contrast  1. Small amount of acute intraventricular hemorrhage layering in the  posterior horn of the left lateral ventricle.  2. Cerebral atrophy with chronic white matter changes.  3. Right supraorbital scalp hematoma. No evidence for fracture.  Finding was identified on 10/3/2017 9:21 AM.   MADDIE FAIR MD    CT Maxillofacial w/o Contrast  Right supraorbital soft tissue swelling. No evidence for  any facial fractures.  MADDIE FAIR MD  Reading per radiology     Laboratory:  Laboratory findings were communicated with the patient and his daughter who voiced understanding of the findings.    CBC: WBC 6.4, HGB 15.1,   ABO/Rh type and screen: ABO: O, Rh(D): Pos, Antibody Screen: Neg  INR: 2.60 (H)  CMP: AST 51 (H), o/w WNL (Creatinine 1.07)  Troponin (Collected 0900): <0.015    Interventions:  1009 Phytonadione 10 mg IV  1046 KCentra 1653 Units IV    Emergency Department Course:  Nursing notes and vitals reviewed.  I performed an exam of the patient as documented above.   IV was inserted and blood was drawn for laboratory testing, results above.  The patient was sent for a CT Maxillofacial w/o Contrast and CT Head w/o Contrast while in the emergency department, results above.   0931 I spoke with radiology regarding the patient's small intraventricular hemorrhage.  3335 I spoke with NILESH Ornelas, of the neurosurgery service,  regarding the patient's presentation and condition.  0982 I rechecked the patient and his daughter and discussed the treatment plan. They expressed understanding of this plan and consented to admission.  1001 I discussed the patient with Dr. Lama, who will admit the patient to a monitored bed for further evaluation and treatment.  I personally reviewed the ECG, imaging, and laboratory results with the patient and answered all related questions prior to admission.    Impression & Plan      Medical Decision Making:  Rell Erazo is an 89 year old male on Coumadin who was brought in by his daughter after he presumably fell and injured his head and face. Given the fact that he is on Coumadin as well as his age and his physical exam findings, I felt it was reasonable and appropriate to proceed with the above work up including the blood work and CT scans of his head and face. He does have a small intraventricular hemorrhage on his CT scan. He does not have any facial fractures though. I subsequently spoke with Alisson Tang, the PA working with the neurosurgery group today. She asked that we reverse his Coumadin with KCentra and vitamin K, which we are now doing. He will be admitted to the ICU. I spoke with Dr. Lama, who will be admitting the patient. This does not appear to be anything that requires any type of surgical intervention.    Critical Care Time for this patient, exclusive of procedures, is 30 minutes.     Diagnosis:    ICD-10-CM    1. Small intraventricular hemorrhage (H) I61.5 ABO/Rh type and screen     Glucose by meter     Glucose by meter   2. Periorbital contusion of right eye, initial encounter S05.11XA        Disposition:  The patient is admitted into the care of Dr. Lama.    Scribe Disclosure:  Ranjeet DOHERTY, am serving as a scribe at 9:08 AM on 10/3/2017 to document services personally performed by Fabio Leon MD based on my observations and the provider's statements to  me.   EMERGENCY DEPARTMENT       Fabio Leon MD  10/03/17 1127

## 2017-10-03 NOTE — IP AVS SNAPSHOT
"[place] [address] [facility]: [phone]                                              INTERAGENCY TRANSFER FORM - PHYSICIAN ORDERS   10/3/2017                    Hospital Admission Date: 10/3/2017  KIMBERLY SHARIF   : 1928  Sex: Male        Attending Provider: Wesley Lama MD     Allergies:  No Known Allergies    Infection:  None   Service:  HOSPITALIST    Ht:  1.778 m (5' 10\")   Wt:  59.4 kg (130 lb 15.3 oz)   Admission Wt:  65.8 kg (145 lb)    BMI:  18.79 kg/m 2   BSA:  1.71 m 2            Patient PCP Information     Provider PCP Type    Physician No Ref-Primary General      ED Clinical Impression     Diagnosis Description Comment Added By Time Added    Intraventricular hemorrhage (H) [I61.5] Small intraventricular hemorrhage (H)  Fabio Leon MD 10/3/2017  9:34 AM    Periorbital contusion of right eye, initial encounter [S05.11XA] Periorbital contusion of right eye, initial encounter [S05.11XA]  Fabio Leon MD 10/3/2017  9:35 AM      Hospital Problems as of 10/7/2017              Priority Class Noted POA    ICH (intracerebral hemorrhage) (H) Medium  10/3/2017 Yes      Non-Hospital Problems as of 10/7/2017              Priority Class Noted    Cerebral infarction (H) Medium  2013    Advanced directives, counseling/discussion Medium  2013    CAD (coronary artery disease) Medium  Unknown    Hyperlipidemia with target LDL less than 100 Medium  Unknown    Influenza A Medium  3/11/2017      Code Status History     Date Active Date Inactive Code Status Order ID Comments User Context    10/7/2017 12:35 PM  DNR/DNI [id number]  Wesley Lama MD Outpatient    10/3/2017 10:45 AM 10/7/2017 12:35 PM DNR/DNI [id number]  Wesley Lama MD Inpatient    3/14/2017  1:35 PM 10/3/2017 10:45 AM DNR/DNI [id number]  Maico Rea MD Outpatient    3/11/2017  6:05 PM 3/14/2017  1:35 PM DNR/DNI [id number]  Gillian Hansen MD Inpatient    2013  9:40 PM " 5/19/2013  2:10 PM Full Code [id number]  Katia Lopez PA-C Inpatient         Medication Review      CONTINUE these medications which may have CHANGED, or have new prescriptions. If we are uncertain of the size of tablets/capsules you have at home, strength may be listed as something that might have changed.        Dose / Directions Comments    * metoprolol 25 MG 24 hr tablet   Commonly known as:  TOPROL-XL   This may have changed:  when to take this   Used for:  Chronic atrial fibrillation (H)        Dose:  25 mg   Take 1 tablet (25 mg) by mouth every morning   Quantity:  30 tablet   Refills:  1    Hold for SBP less than 100 or HR < 60       * metoprolol 50 MG 24 hr tablet   Commonly known as:  TOPROL-XL   This may have changed:  You were already taking a medication with the same name, and this prescription was added. Make sure you understand how and when to take each.   Used for:  Chronic atrial fibrillation (H)        Dose:  50 mg   Take 1 tablet (50 mg) by mouth every evening   Quantity:  30 tablet   Refills:  0    Hold for SBP less than 100 or HR < 60       * Notice:  This list has 2 medication(s) that are the same as other medications prescribed for you. Read the directions carefully, and ask your doctor or other care provider to review them with you.      CONTINUE these medications which have NOT CHANGED        Dose / Directions Comments    LIPITOR PO        Dose:  20 mg   Take 20 mg by mouth daily   Refills:  0          STOP taking     warfarin 5 MG tablet   Commonly known as:  COUMADIN                     Further instructions from your care team       Pt to [email or web address] to [facility] (P: [phone]; F: [phone]) between [id number].     Summary of Visit     Reason for your hospital stay       ICH             After Care     Activity - Up with nursing assistance           Advance Diet as Tolerated       Follow this diet upon discharge: Orders Placed This Encounter      Combination  Diet Regular Diet Adult         Fall precautions           General info for SNF       Length of Stay Estimate: Short Term Care: Estimated # of Days <30  Condition at Discharge: Improving  Level of care:skilled   Rehabilitation Potential: Good  Admission H&P remains valid and up-to-date: Yes  Recent Chemotherapy: N/A  Use Nursing Home Standing Orders: N/A       Mantoux instructions       Give two-step Mantoux (PPD) Per Facility Policy Yes             Referrals     Occupational Therapy Adult Consult       Evaluate and treat as clinically indicated.    Reason:       Physical Therapy Adult Consult       Evaluate and treat as clinically indicated.    Reason:             Radiology & Cardiology Orders     Future Labs/Procedures Complete By Expires    CT Head w/o contrast*  11/15/2017 (Approximate) 1/2/2018    Comments:    Administration of IV contrast (contrast agent, dose, and amount) will be tailored to this examination per the appropriate written protocol listed in the Protocol E-Book, or by the supervising imaging provider.       Radiology & Cardiology Orders     CT Head w/o contrast*       Administration of IV contrast (contrast agent, dose, and amount) will be tailored to this examination per the appropriate written protocol listed in the Protocol E-Book, or by the supervising imaging provider.              Follow-Up Appointment Instructions     Future Labs/Procedures    Follow Up and recommended labs and tests     Comments:    Follow up with Nursing home physician.    Neurosurgery in 4 weeks    Follow-up and recommended labs and tests      Comments:    [facility] 4 weeks with repeat CT of head prior.  Please call the clinic at [phone] to schedule your appointment with Rashawn Garcia PA-C or Janet Salazar PA-C.      Follow-Up Appointment Instructions     Follow Up and recommended labs and tests       Follow up with Nursing home physician.    Neurosurgery in 4 weeks        Follow-up and recommended labs and tests        Please follow up at [facility] in 4 weeks with repeat CT of head prior.  Please call the clinic at [phone] to schedule your appointment with Rashawn Garcia PA-C or Janet Salazar PA-C.             Statement of Approval     Ordered          10/07/17 1236  I have reviewed and agree with all the recommendations and orders detailed in this document.  EFFECTIVE NOW     Approved and electronically signed by:  Wesley Lama MD

## 2017-10-03 NOTE — PHARMACY-ADMISSION MEDICATION HISTORY
Admission medication history interview status for the 10/3/2017  admission is complete. See EPIC admission navigator for prior to admission medications     Medication history source reliability:Good    Actions taken by pharmacist (provider contacted, etc):None     Additional medication history information not noted on PTA med list :None    Medication reconciliation/reorder completed by provider prior to medication history? No    Time spent in this activity: 10    Prior to Admission medications    Medication Sig Last Dose Taking? Auth Provider   Atorvastatin Calcium (LIPITOR PO) Take 20 mg by mouth daily 10/2/2017 at Unknown time Yes Unknown, Entered By History   warfarin (COUMADIN) 5 MG tablet Take 1 tablet (5 mg) by mouth daily 10/2/2017 at Unknown time Yes Boone Shepherd MD   metoprolol (TOPROL-XL) 25 MG 24 hr tablet Take 1 tablet (25 mg) by mouth daily 10/2/2017 at Unknown time Yes Boone Shepherd MD

## 2017-10-03 NOTE — IP AVS SNAPSHOT
MRN:[id number]                      After Visit Summary   10/3/2017    Rell Erazo    MRN: [id number]           Thank you!     Thank you for choosing [facility] for your care. Our goal is always to provide you with excellent care. Hearing back from our patients is one way we can continue to improve our services. Please take a few minutes to complete the written survey that you may receive in the mail after you visit with us. Thank you!        Patient Information     Date Of Birth          6/5/1928        Designated Caregiver       Most Recent Value    Caregiver    Will someone help with your care after discharge? yes    Name of designated caregiver rachel    Phone number of caregiver in chart    Caregiver address in chart      About your hospital stay     You were admitted on:  October 3, 2017 You last received care in the:  [place] [address] [facility]    You were discharged on:  October 7, 2017        Reason for your hospital stay       ICH                  Who to Call     For medical emergencies, please call 911.  For non-urgent questions about your medical care, please call your primary care provider or clinic, None          Attending Provider     Provider Specialty    Fabio Leon MD Emergency Medicine    KelbyWesley joshua MD Internal Medicine       Primary Care Provider Fax #    Physician No Ref-Primary [phone]      After Care Instructions     Activity - Up with nursing assistance           Advance Diet as Tolerated       Follow this diet upon discharge: Orders Placed This Encounter      Combination Diet Regular Diet Adult              Fall precautions           General info for SNF       Length of Stay Estimate: Short Term Care: Estimated # of Days <30  Condition at Discharge: Improving  Level of care:skilled   Rehabilitation Potential: Good  Admission H&P remains valid and up-to-date: Yes  Recent Chemotherapy: N/A  Use Nursing Home Standing Orders: N/A          "   Mantoux instructions       Give two-step Mantoux (PPD) Per Facility Policy Yes                  Follow-up Appointments     Follow Up and recommended labs and tests       Follow up with Nursing home physician.    Neurosurgery in 4 weeks            Follow-up and recommended labs and tests        [facility] 4 weeks with repeat CT of head prior.  Please call the clinic at [phone] to schedule your appointment with Rashawn Garcia PA-C or Janet Salazar PA-C.                  Additional Services     Occupational Therapy Adult Consult       Evaluate and treat as clinically indicated.    Reason:            Physical Therapy Adult Consult       Evaluate and treat as clinically indicated.    Reason:                  Future tests that were ordered for you     CT Head w/o contrast*       Administration of IV contrast (contrast agent, dose, and amount) will be tailored to this examination per the appropriate written protocol listed in the Protocol E-Book, or by the supervising imaging provider.                  Further instructions from your care team       Pt to d.c to [facility] (P: [phone]; F: [phone]) between 9195-7376.     Pending Results     No orders found from 10/1/2017 to 10/4/2017.            Statement of Approval     Ordered          10/07/17 1236  I have reviewed and agree with all the recommendations and orders detailed in this document.  EFFECTIVE NOW     Approved and electronically signed by:  Wesley Lama MD             Admission Information     Date & Time Provider Department Dept. Phone    10/3/2017 Wesley Lama MD [place] [address] [facility] [phone]      Your Vitals Were     Blood Pressure Pulse Temperature Respirations Height Weight    120/70 (BP Location: Right arm) 64 98  F (36.7  C) (Oral) 16 1.778 m (5' 10\") 59.4 kg (130 lb 15.3 oz)    Pulse Oximetry BMI (Body Mass Index)                95% 18.79 kg/m2   " "       MyChart Information     Press Play lets you send messages to your doctor, view your test results, renew your prescriptions, schedule appointments and more. To sign up, go to [email or web address] . Click on \"Log in\" on the left side of the screen, which will take you to the Welcome page. Then click on \"Sign up Now\" on the right side of the page.     You will be asked to enter the access code listed below, as well as some personal information. Please follow the directions to create your username and password.     Your access code is: [email or web address]  Expires: 2018 12:44 PM     Your access code will  in 90 days. If you need help or a new code, please call your [place] clinic or [phone].        Care EveryWhere ID     This is your Care EveryWhere ID. This could be used by other organizations to access your [facility] medical records  [id number]        Equal Access to Services     CLAUDIA MORALES : Hadkeshia barlowo Soderick, waaxda luqadaha, qaybta kaalmada adearmondyavivek, quinn harrell . So [facility] [phone].    ATENCIÓN: Si bushra prescott, tiene a trevizo disposición servicios gratuitos de asistencia lingüística. Llame al [phone].    We comply with applicable federal civil rights laws and [place] laws. We do not discriminate on the basis of race, color, national origin, age, disability, sex, sexual orientation, or gender identity.               Review of your medicines      CONTINUE these medicines which may have CHANGED, or have new prescriptions. If we are uncertain of the size of tablets/capsules you have at home, strength may be listed as something that might have changed.        Dose / Directions    * metoprolol 25 MG 24 hr tablet   Commonly known as:  TOPROL-XL   This may have changed:  when to take this   Used for:  Chronic atrial fibrillation (H)        Dose:  25 mg   Take 1 tablet (25 mg) by mouth every morning   Quantity:  30 tablet   Refills:  1       * metoprolol " 50 MG 24 hr tablet   Commonly known as:  TOPROL-XL   This may have changed:  You were already taking a medication with the same name, and this prescription was added. Make sure you understand how and when to take each.   Used for:  Chronic atrial fibrillation (H)        Dose:  50 mg   Take 1 tablet (50 mg) by mouth every evening   Quantity:  30 tablet   Refills:  0       * Notice:  This list has 2 medication(s) that are the same as other medications prescribed for you. Read the directions carefully, and ask your doctor or other care provider to review them with you.      CONTINUE these medicines which have NOT CHANGED        Dose / Directions    LIPITOR PO        Dose:  20 mg   Take 20 mg by mouth daily   Refills:  0         STOP taking     warfarin 5 MG tablet   Commonly known as:  COUMADIN                Where to get your medicines      Some of these will need a paper prescription and others can be bought over the counter. Ask your nurse if you have questions.     You don't need a prescription for these medications     metoprolol 25 MG 24 hr tablet    metoprolol 50 MG 24 hr tablet                Protect others around you: Learn how to safely use, store and throw away your medicines at www.disposemymeds.org.             Medication List: This is a list of all your medications and when to take them. Check marks below indicate your daily home schedule. Keep this list as a reference.      Medications           Morning Afternoon Evening Bedtime As Needed    LIPITOR PO   Take 20 mg by mouth daily   Last time this was given:  20 mg on 10/7/2017  9:31 AM                                * metoprolol 25 MG 24 hr tablet   Commonly known as:  TOPROL-XL   Take 1 tablet (25 mg) by mouth every morning   Last time this was given:  50 mg on 10/7/2017  9:31 AM                                * metoprolol 50 MG 24 hr tablet   Commonly known as:  TOPROL-XL   Take 1 tablet (50 mg) by mouth every evening   Last time this was given:  50 mg  on 10/7/2017  9:31 AM                                * Notice:  This list has 2 medication(s) that are the same as other medications prescribed for you. Read the directions carefully, and ask your doctor or other care provider to review them with you.

## 2017-10-03 NOTE — IP AVS SNAPSHOT
[facility]    [address]    [place] [address]    Phone:  [phone]                                       After Visit Summary   10/3/2017    Rell Erazo    MRN: [id number]           After Visit Summary Signature Page     I have received my discharge instructions, and my questions have been answered. I have discussed any challenges I see with this plan with the nurse or doctor.    ..........................................................................................................................................  Patient/Patient Representative Signature      ..........................................................................................................................................  Patient Representative Print Name and Relationship to Patient    ..................................................               ................................................  Date                                            Time    ..........................................................................................................................................  Reviewed by Signature/Title    ...................................................              ..............................................  Date                                                            Time

## 2017-10-03 NOTE — PROGRESS NOTES
PT passed Nursing Dysphagia Screen with 3 oz water test and additionally ate hamburger for lunch without any concerns identified.  Educated nurse re: documentation of swallow screen. Will defer formal swallow eval at this time and complete order.  Please re-consult if dysphagia concerns are perceived.

## 2017-10-03 NOTE — H&P
PRIMARY CARE PROVIDER:  Bossman Álvarez      DATE OF ADMISSION:  10/03/2017      CHIEF COMPLAINT:  Fall.      HISTORY OF PRESENT ILLNESS:  Mr. Rell Erazo is an 89-year-old male with history of coronary artery disease, status post bypass graft, cerebrovascular accident, atrial fibrillation on anticoagulation, likely early dementia, hyperlipidemia who was brought to the emergency room by his daughter after he had a black eye from a presumed fall.  The patient reportedly called his daughter this morning because he was having some difficulty seeing through his right eye and he also noticed that he had a black eye.  The patient does not remember the circumstances as far as how that happened.  He does not remember having a fall or any trauma.  Per the wife, he has a caregiver at home that came there yesterday and left before 4:00 p.m. until which time the patient was fine.  She suspected the fall happened around 4:00 p.m.  There was also some blood on one of the walls of his home.  The patient denies any pain or any other trauma to any other areas.  There was no history of bowel or bladder incontinence.  Again, the patient does not have any recollection of this event and the circumstances under which he had a black eye is unclear.  There was no witness for the suspected fall.  As far as other pertinent review of system is concerned, he denies any chest pain, no shortness of breath or palpitations.  No recent illness like diarrhea or vomiting, no dysuria, urgency or frequency.  The daughter who was at the bedside does mention that he has been having balance problems lately and they have been trying to train him to use a cane.  He also has some dementia but he is still driving short distances and is living independently until now.      In the emergency room, the patient was evaluated by Dr. Fabio Leon.  Basic lab work was unremarkable.  A CT head was done which showed a small amount of acute intraventricular hemorrhage  in the posterior horn of the left lateral ventricle.  His INR was 2.6.  Neurosurgery was contacted from the emergency room.  The patient is receiving vitamin K and Kcentra.  He is being admitted to the ICU for close monitoring.      SIGNIFICANT PAST MEDICAL HISTORY:   1.  Coronary artery disease, status post bypass graft.   2.  Cerebrovascular accident.   3.  Hyperlipidemia.   4.  BPH.   5.  Atrial fibrillation.   6.  Bilateral carotid artery disease.      ALLERGIES:  None.      SOCIAL AND PERSONAL HISTORY:  He lives independently.  Drinks 1 glass of wine every day.  No recreational drug use.      FAMILY HISTORY:  Reviewed and includes heart disease in his brother and father.      SOCIAL HISTORY:  He has about 20-pack-year smoking history.        HOME MEDICATIONS:   Prior to Admission Medications   Prescriptions Last Dose Informant Patient Reported? Taking?   Atorvastatin Calcium (LIPITOR PO) 10/2/2017 at Unknown time Daughter Yes Yes   Sig: Take 20 mg by mouth daily   warfarin (COUMADIN) 5 MG tablet 10/2/2017 at Unknown time Daughter No No   Sig: Take 1 tablet (5 mg) by mouth daily      Facility-Administered Medications: None        REVIEW OF SYSTEMS:  A complete review of system was done and was negative for anything else other than that mentioned in the HPI.      PHYSICAL EXAMINATION:   GENERAL:  Mr. Erazo is an 89-year-old male.  He is not in any acute distress.   VITAL SIGNS:  Temperature 98.5, blood pressure 153/80, heart rate 94, respiration rate 13, O2 sat 98% on room air.   HEENT:  Reveals periorbital bruise on the right eye.  He is able to pry open his eye appropriately and does have his visual acuity intact per gross bedside testing.   NECK:  Supple, with good range of motion.   RESPIRATORY:  Good air entry bilaterally with normal effort of breathing.   CARDIOVASCULAR:  Regular rate and rhythm.  There is no murmur.   ABDOMEN:  Soft, nontender, nondistended.  Bowel sounds normoactive.   EXTREMITIES:   There is no edema, cyanosis or clubbing.  He does have bruising over bilateral elbow.  He also has a small bruise over his right mid back around the T11-T12 area.   SKIN:  Warm and dry.  There are no rashes.   NEUROLOGIC:  Cranial nerves II-XII is intact.  He is moving all 4 extremities appropriately with intact power.  Gait was deferred.      LABORATORY DATA:  Normal electrolytes.  Troponin is negative.  AST was marginally elevated at 51.  INR 2.6.  CT head as described above.  A 12-lead EKG showing borderline QT prolongation with normal sinus rhythm with premature atrial complexes.      ASSESSMENT AND PLAN:  Mr. Erazo is an 89-year-old male with history of coronary artery disease, atrial fibrillation on chronic Coumadin, cerebrovascular accident, hyperlipidemia who presents to the emergency room with suspected fall and is found to have intracranial hemorrhage.   1.  Suspected fall resulting in traumatic intracranial hemorrhage.  There were no witnesses however, the patient does have significant periorbital bruising of his right thigh.  It is suspected that the patient sustained a fall and had a traumatic intraventricular hemorrhage.  The patient has no recollection of this event.  At this time, he will be admitted to the ICU, will monitor his neuro status every 2 hours.  Neurosurgery was consulted from the emergency room.  We will await for their expert opinion.  He is on Coumadin, his INR was 2.6.  This was reversed with Kcentra and vitamin K at this time.  We will repeat a head CT in the morning.   2.  History of atrial fibrillation on chronic anticoagulation.  His Coumadin will be on hold.  Will discuss with Neurosurgery as far as when he could eventually be restarted or given his age and recent balance problem and memory problems be off long-term anticoagulation.  He does, however, have a history of prior cerebrovascular accidents.  Again, the decision for anticoagulation will be deferred at this time.   3.   Hypertension.  He is on metoprolol 25 mg daily.  I will hold that and put him on p.r.n. labetalol and hydralazine with a target blood pressure of less than 160.   4.  Coronary artery disease history.  He is on simvastatin and metoprolol.  It does appear like he is not on any antiplatelet agent at this time.  Does not have any active symptoms.  Denies any recent chest pain or angina equivalent symptoms.  We will just monitor clinically.   5.  Multiple bruises.  He does have bruises on bilateral elbows and a bruise on his back.  Does have good range of motion.  Again, presumably he had a fall resulting in those bruises.  Will monitor clinically.  Physical Therapy and Occupational Therapy will be consulted.      Anticipated length of stay more than 2 midnights.         JASKARAN WEINBERG MD             D: 10/03/2017 10:38   T: 10/03/2017 11:41   MT: ARIADNA      Name:     KIMBERLY SHARIF   MRN:      [id number]        Account:      [id number]   :      1928           Admitted:     [id number]      Document: [id number]       cc: RYANNE Álvarez MD

## 2017-10-03 NOTE — IP AVS SNAPSHOT
` ` Patient Information     Patient Name Sex     Rell Erazo ([id number]) Male 1928       Room Bed    [facility] 0703-02      Patient Demographics     Address Phone    [address] [address] [phone] (Home) *Preferred*      Patient Ethnicity & Race     Ethnic Group Patient Race    American White      Emergency Contact(s)     Name Relation Home Work Mobile    Elisabeth Erazo Spouse [phone]      Castro Doss  Daughter [phone]      Castro Torres Daughter [phone]        Documents on File        Status Date Received Description       Documents for the Patient    Privacy Notice - [place] Received 05/10/12     Insurance Card Received 13      External Medication Information Consent Patient Refused 13     Patient ID Received 13     Consent for Services - Hospital/Clinic Received () 05/10/12     Consent for Services - Informed Signed 05/10/12     Consent to Communicate Received () 05/10/12     Consent to Communicate  12 AUTHORIZATION TO DISCUSS PROTECTED HEALTH INFORMATION    Consent for EHR Access  13 Copied from existing Consent for services - C/HOD collected on 05/10/2012    [facility] Specified Other       Consent for Services - Hospital/Clinic Received () 13     Occupational Therapy Certification Received 13     [employer]IM [employer] Authorization  13 Patient    [employer]IM [employer] Authorization  13 [facility] dated 2013    HIM M[employer]A Authorization  13 [facility]    HIM MARTA Authorization  14 [facility]-14    Consent for Services - Hospital/Clinic Received () 14     Consent for Services/Privacy Notice - Hospital/Clinic Received 10/24/16     [employer] Authorization  10/26/16 Patient    [employer]IM [employer] Authorization - File Only  17 THE PATIENT AND DAUGHTER JULISSA ERAZO    HIM [employer]A Authorization  17 patient/[facility]SD       Documents for the Encounter     CMS IM for Patient Signature Received 10/05/17 1MM    CMS IM for Patient Signature Received 10/07/17 2MM      Admission Information     Attending Provider Admitting Provider Admission Type Admission Date/Time    Wesley Lama MD Bhattarai, Nimesh, MD Emergency 10/03/17  0848    Discharge Date Hospital Service Auth/Cert Status Service Area     Hospitalist Incomplete Health system    Unit Room/Bed Admission Status       [facility] 0703/0703-02 Admission (Confirmed)       Admission     Complaint    Small intraventricular hemorrhage (H), ICH (intracerebral hemorrhage) (H)      Hospital Account     Name Acct ID Class Status Primary Coverage    Rell Erazo [id number] Inpatient Open MEDICARE - MEDICARE            Guarantor Account (for Hospital Account #[id number])     Name Relation to Pt Service Area Active? Acct Type    Rell Erazo Self FCS Yes Personal/Family    Address Phone          [address] [place] [address] [phone](H)              Coverage Information (for Hospital Account #[id number])     1. MEDICARE/MEDICARE     F/O Payor/Plan Precert #    MEDICARE/MEDICARE     Subscriber Subscriber #    Rell Erazo [id number]    Address Phone    [employer] CLAIMS  [address]  [facility] [place] [address] [phone]          2. // FOR LIFE     F/O Payor/Plan Precert #    // FOR LIFE     Subscriber Subscriber #    Rell Erazo [id number]    Address Phone     [employer]  [address]  [place] [address]

## 2017-10-03 NOTE — ED NOTES
"[facility]  ED Nurse Handoff Report    ED Chief complaint: Bleeding/Bruising (Pt's daughter reports patient had a \"black eye\" noticed today.  Pateint unsure how it happened.  Probably happened yesterday.  Unsure of fall or LOC.  On coumadin.) and Head Injury (pt does not remember falling and receiving black eye. daughter states pt was last seen fine at 1600 yesterday)      ED Diagnosis:   Final diagnoses:   Small intraventricular hemorrhage (H)   Periorbital contusion of right eye, initial encounter       Code Status: DNR / DNI    Allergies: No Known Allergies    Activity level - Baseline/Home:  Independent    Activity Level - Current:   Stand with Assist     Needed?: No    Isolation: No  Infection: Not Applicable    Bariatric?: No    Vital Signs:   Vitals:    10/03/17 0857   BP: 156/80   Pulse: 94   Resp: 16   Temp: 98.5  F (36.9  C)   TempSrc: Oral   SpO2: 96%       Cardiac Rhythm: ,   Cardiac  Cardiac Rhythm: Normal sinus rhythm    Pain level:      Is this patient confused?: No    Patient Report: Initial Complaint: fall  Focused Assessment: pt was seen by family at 1600 yesterday. Pt does not recall falling at any point in time. Pt states that black eye started yesterday but unsure about time. Pt called his daughter this morning to tell her that he was blind, she came over to look at him noticing black eye and brought to ED. Pt has bilateral skin tears on elbows. Daughter states there is blood throughout the home and that he was spitting up blood into the toilet.   Tests Performed: CT, labs  Abnormal Results: head bleed  Treatments provided: meds in MAR    Family Comments: daughter at bedside    OBS brochure/video discussed/provided to patient: No    ED Medications:   Medications   phytonadione (AQUA-MEPHYTON) 10 mg in NaCl 0.9 % 50 mL intermittent infusion (not administered)   prothrombin 4 factor complex concentrate (KCENTRA) infusion 25 Units/kg (Dosing Weight) (not administered) "       Drips infusing?:  No      ED NURSE PHONE NUMBER: [phone]

## 2017-10-03 NOTE — CONSULTS
[facility]    Neurosurgery Consultation     Date of Admission:  10/3/2017  Date of Consult (When I saw the patient): 10/03/17    Assessment & Plan   Rell Erazo is a 89 year old male who was admitted on 10/3/2017. I was asked to see the patient for IVH.    Active Problems:    IVH-reverse coumadin. CT in am. Non surgical at this time.       I have discussed the following assessment and plan with Dr. Masters who is in agreement with initial plan and will follow up with further consultation recommendations.    Rashawn Garcia PA-C  [facility]  [address]  [address]  [place] [address]    Tel [phone]  Pager [phone]        Code Status    Prior    Reason for Consult   Reason for consult: IVH    Primary Care Physician   Physician No Ref-Primary    Chief Complaint   IVH    History is obtained from the patient, electronic health record and patient's family    History of Present Illness   Rell Erazo is a 89 year old male who presents with IVH, following at fall at home. He does not remember falling. He denies any headache now or previously. Per other family at bedside, he is a very poor historian. Large ecchymosis around right eye. On coumadin since 2016 for afib.     Past Medical History   I have reviewed this patient's medical history and updated it with pertinent information if needed.   Past Medical History:   Diagnosis Date     CAD (coronary artery disease)     S/P 3-vessel CABG 2012     CVA (cerebral infarction) 2013    Small, bifrontal CVA     Hyperlipidemia LDL goal < 100        Past Surgical History   I have reviewed this patient's surgical history and updated it with pertinent information if needed.  Past Surgical History:   Procedure Laterality Date     CARDIAC SURGERY  4/2012    x3 CABG       Prior to Admission Medications   Prior to Admission Medications   Prescriptions Last Dose Informant Patient Reported? Taking?   Atorvastatin  "Calcium (LIPITOR PO) 10/2/2017 at Unknown time Daughter Yes Yes   Sig: Take 20 mg by mouth daily   metoprolol (TOPROL-XL) 25 MG 24 hr tablet 10/2/2017 at Unknown time Daughter No Yes   Sig: Take 1 tablet (25 mg) by mouth daily   warfarin (COUMADIN) 5 MG tablet 10/2/2017 at Unknown time Daughter No Yes   Sig: Take 1 tablet (5 mg) by mouth daily      Facility-Administered Medications: None     Allergies   No Known Allergies    Social History   I have reviewed this patient's social history and updated it with pertinent information if needed. Rell Erazo  reports that he has never smoked. He does not have any smokeless tobacco history on file. He reports that he drinks alcohol.    Family History   I have reviewed this patient's family history and updated it with pertinent information if needed.   No family history on file.    Review of Systems   Per HPI and PMH    Physical Exam   Temp: 98.5  F (36.9  C) Temp src: Oral BP: 127/74 Pulse: 94 Heart Rate: 60 Resp: 13 SpO2: 96 % O2 Device: None (Room air)    Vital Signs with Ranges  Temp:  [98.5  F (36.9  C)] 98.5  F (36.9  C)  Pulse:  [94] 94  Heart Rate:  [60-63] 60  Resp:  [13-16] 13  BP: (118-156)/(66-80) 127/74  SpO2:  [95 %-96 %] 96 %  145 lbs 0 oz    Heart Rate: 60, Blood pressure 127/74, pulse 94, temperature 98.5  F (36.9  C), temperature source Oral, resp. rate 13, height 5' 10\" (1.778 m), weight 145 lb (65.8 kg), SpO2 96 %.  145 lbs 0 oz  HEENT:  Normocephalic, large ecchymosis around right eye.  PERRLA.  EOM s intact.   Neck:  Supple, non-tender, without lymphadenopathy.  Heart:  No peripheral edema  Lungs:  No SOB  Abdomen:  Soft, non-tender, non-distended.    Skin:  Warm and dry, good capillary refill.  Extremities:  Good radial and dorsalis pedis pulses bilaterally, no edema, cyanosis or clubbing.    NEUROLOGICAL EXAMINATION:     Mental status:  Alert and Oriented x 3, speech is fluent.  Cranial nerves:  II-XII intact.   Motor:  Strength is 5/5 throughout " the upper and lower extremities  Shoulder Abduction:  Right:  5/5   Left:  5/5  Biceps:                      Right:  5/5   Left:  5/5  Triceps:                     Right:  5/5   Left:  5/5  Wrist Extensors:       Right:  5/5   Left:  5/5  Wrist Flexors:           Right:  5/5   Left:  5/5  interosseus :            Right:  5/5   Left:  5/5  Sensation:  intact  Reflexes:   Negative Babinski.  Negative Clonus.    Coordination:  Smooth finger to nose testing.   Negative pronator drift.   Gait:not checked        Data   All new lab and imaging data was personally reviewed by me.  CT:IMPRESSION:  1. Small amount of acute intraventricular hemorrhage layering in the  posterior horn of the left lateral ventricle.  2. Cerebral atrophy with chronic white matter changes.  3. Right supraorbital scalp hematoma. No evidence for fracture.  CBC RESULTS:   Recent Labs   Lab Test  10/03/17   0900   WBC  6.4   RBC  4.56   HGB  15.1   HCT  42.4   MCV  93   MCH  33.1*   MCHC  35.6   RDW  12.9   PLT  201     Basic Metabolic Panel:  Lab Results   Component Value Date     10/03/2017      Lab Results   Component Value Date    POTASSIUM 4.3 10/03/2017     Lab Results   Component Value Date    CHLORIDE 105 10/03/2017     Lab Results   Component Value Date    MING 8.7 10/03/2017     Lab Results   Component Value Date    CO2 27 10/03/2017     Lab Results   Component Value Date    BUN 18 10/03/2017     Lab Results   Component Value Date    CR 1.07 10/03/2017     Lab Results   Component Value Date    GLC 99 10/03/2017     INR:  Lab Results   Component Value Date    INR 2.60 10/03/2017    INR 1.71 03/14/2017    INR 1.84 03/13/2017    INR 2.42 03/12/2017    INR 2.04 03/11/2017

## 2017-10-03 NOTE — PLAN OF CARE
Problem: Patient Care Overview  Goal: Plan of Care/Patient Progress Review  Outcome: Improving  AxOx4, short term memory loss which is baseline. Only neuro deficit is when smiling right side of smile turns down, unsure if this could be related to edema from right black eye. Denies pain. Recheck INR 1.1. Tolerating regular diet, voiding. Passed bedside swallow. RA. Updated patient on plan of care. Plan for head CT tomorrow morning to follow up.

## 2017-10-03 NOTE — IP AVS SNAPSHOT
"` `     [facility]: [phone]            Medication Administration Report for Rell Erazo as of 10/07/17 1330   Legend:    Given Hold Not Given Due Canceled Entry Other Actions    Time Time (Time) Time  Time-Action       Inactive    Active    Linked        Medications 10/01/17 10/02/17 10/03/17 10/04/17 10/05/17 10/06/17 10/07/17    acetaminophen (TYLENOL) tablet 650 mg  Dose: 650 mg Freq: EVERY 4 HOURS PRN Route: PO  PRN Reason: mild pain  Start: 10/03/17 1045   Admin Instructions: Alternate ibuprofen (if ordered) with acetaminophen.  Maximum acetaminophen dose from all sources = 75 mg/kg/day not to exceed 4 grams/day.         2205 (650 mg)-Given             atorvastatin (LIPITOR) tablet 20 mg  Dose: 20 mg Freq: DAILY Route: PO  Start: 10/03/17 1400      1315 (20 mg)-Given        0844 (20 mg)-Given        0933 (20 mg)-Given        0846 (20 mg)-Given        0931 (20 mg)-Given           bisacodyl (DULCOLAX) Suppository 10 mg  Dose: 10 mg Freq: DAILY PRN Route: RE  PRN Reason: constipation  Start: 10/03/17 1045   Admin Instructions: Hold for loose stools.  This is the third step of a three step constipation treatment protocol.               hydrALAZINE (APRESOLINE) injection 10 mg  Dose: 10 mg Freq: EVERY 4 HOURS PRN Route: IV  PRN Reason: high blood pressure  PRN Comment: give for SBP > 160  Start: 10/03/17 1045      1315 (10 mg)-Given               labetalol (NORMODYNE/TRANDATE) injection 10 mg  Dose: 10 mg Freq: EVERY 2 HOURS PRN Route: IV  PRN Reason: high blood pressure  PRN Comment: give for SBP > 160  Start: 10/03/17 1045   Admin Instructions: Hold for HR < 60               lidocaine (LMX4) cream  Freq: EVERY 1 HOUR PRN Route: Top  PRN Reason: pain  PRN Comment: with VAD insertion or accessing implanted port.  Start: 10/03/17 1045   Admin Instructions: Do NOT give if patient has a history of allergy to any local anesthetic or any \"rosalind\" product.   Apply 30 minutes prior " "to VAD insertion or port access.  MAX Dose:  2.5 g (  of 5 g tube)               lidocaine 1 % 1 mL  Dose: 1 mL Freq: EVERY 1 HOUR PRN Route: OTHER  PRN Comment: mild pain with VAD insertion or accessing implanted port  Start: 10/03/17 1045   Admin Instructions: Do NOT give if patient has a history of allergy to any local anesthetic or any \"rosalind\" product. MAX dose 1 mL subcutaneous OR intradermal in divided doses.               melatonin tablet 1 mg  Dose: 1 mg Freq: AT BEDTIME PRN Route: PO  PRN Reason: sleep  Start: 10/03/17 1045   Admin Instructions: Do not give unless at least 6 hours of uninterrupted sleep is expected.               metoprolol (LOPRESSOR) half-tab 12.5 mg  Dose: 12.5 mg Freq: 3 TIMES DAILY PRN Route: PO  PRN Comment: HR>120  Start: 10/06/17 1049   Admin Instructions: Hold for SBP < 100 or HR < 60  See Labetalol IV prn order - s/b ~2 hrs apart          2342 (12.5 mg)-Given            metoprolol (TOPROL-XL) 24 hr tablet 50 mg  Dose: 50 mg Freq: 2 TIMES DAILY Route: PO  Start: 10/07/17 0900   Admin Instructions: DO NOT CRUSH. Tablet may be split in half along score line.           0931 (50 mg)-Given       [ ] 2100           naloxone (NARCAN) injection 0.1-0.4 mg  Dose: 0.1-0.4 mg Freq: EVERY 2 MIN PRN Route: IV  PRN Reason: opioid reversal  Start: 10/03/17 1045   Admin Instructions: For respiratory rate LESS than or EQUAL to 8.  Partial reversal dose:  0.1 mg titrated q 2 minutes for Analgesia Side Effects Monitoring Sedation Level of 3 (frequently drowsy, arousable, drifts to sleep during conversation).Full reversal dose:  0.4 mg bolus for Analgesia Side Effects Monitoring Sedation Level of 4 (somnolent, minimal or no response to stimulation).               ondansetron (ZOFRAN-ODT) ODT tab 4 mg  Dose: 4 mg Freq: EVERY 6 HOURS PRN Route: PO  PRN Reasons: nausea,vomiting  Start: 10/03/17 1045   Admin Instructions: This is Step 1 of nausea and vomiting management.  If nausea not resolved in 15 " minutes, go to Step 2 prochlorperazine (COMPAZINE). Do not push through foil backing. Peel back foil and gently remove. Place on tongue immediately. Administration with liquid unnecessary              Or  ondansetron (ZOFRAN) injection 4 mg  Dose: 4 mg Freq: EVERY 6 HOURS PRN Route: IV  PRN Reasons: nausea,vomiting  Start: 10/03/17 1045   Admin Instructions: This is Step 1 of nausea and vomiting management.  If nausea not resolved in 15 minutes, go to Step 2 prochlorperazine (COMPAZINE).  Irritant.               polyethylene glycol (MIRALAX/GLYCOLAX) Packet 17 g  Dose: 17 g Freq: DAILY PRN Route: PO  PRN Reason: constipation  Start: 10/03/17 1045   Admin Instructions: Give in 8oz of  water, juice, or soda. Hold for loose stools.  This is the second step of a three step constipation treatment protocol.  1 Packet = 17 grams. Mixed prescribed dose in 8 ounces of water. Follow with 8 oz. of water.               potassium chloride (KLOR-CON) Packet 20-40 mEq  Dose: 20-40 mEq Freq: EVERY 2 HOURS PRN Route: ORAL OR FEED  PRN Reason: potassium supplementation  Start: 10/03/17 1045   Admin Instructions: Use if unable to tolerate tablets.  If Serum K+ 3.0-3.3, dose = 60 mEq po total dose (40 mEq x1 followed in 2 hours by 20 mEq x1). Recheck K+ level 4 hours after dose and the next AM.  If Serum K+ 2.5-2.9, dose = 80 mEq po total dose (40 mEq Q2H x2). Recheck K+ level 4 hours after dose and the next AM.  If Serum K+ less than 2.5, See IV order.  Dissolve packet contents in 4-8 ounces of cold water or juice.               potassium chloride 10 mEq in 100 mL intermittent infusion  Dose: 10 mEq Freq: EVERY 1 HOUR PRN Route: IV  PRN Reason: potassium supplementation  Start: 10/03/17 1045   Admin Instructions: Infuse via PERIPHERAL LINE or CENTRAL LINE. Use for central line replacement if patient weight less than 65 kg, if patient is on TPN with high potassium content or if unit does not stock 20 mEq bags.   If Serum K+ 3.0-3.3,  dose = 10 mEq/hr x4 doses (40 mEq IV total dose). Recheck K+ level 2 hours after dose and the next AM.   If Serum K+ less than 3.0, dose = 10 mEq/hr x6 doses (60 mEq IV total dose). Recheck K+ level 2 hours after dose and the next AM.               potassium chloride 10 mEq in 100 mL intermittent infusion with 10 mg lidocaine  Dose: 10 mEq Freq: EVERY 1 HOUR PRN Route: IV  PRN Reason: potassium supplementation  Start: 10/03/17 1045   Admin Instructions: Infuse via PERIPHERAL LINE. Use potassium with lidocaine for pain with peripheral administration.  If Serum K+ 3.0-3.3, dose = 10 mEq/hr x4 doses (40 mEq IV total dose). Recheck K+ level 2 hours after dose and the next AM.  If Serum K+ less than 3.0, dose = 10 mEq/hr x6 doses (60 mEq IV total dose). Recheck K+ level 2 hours after dose and the next AM.               potassium chloride 20 mEq in 50 mL intermittent infusion  Dose: 20 mEq Freq: EVERY 1 HOUR PRN Route: IV  PRN Reason: potassium supplementation  Start: 10/03/17 1045   Admin Instructions: Infuse via CENTRAL LINE Only. May need EKG if less than 65 kg or on TPN - Max rate is 0.3 mEq/kg/hr for patients not on EKG monitoring.   If Serum K+ 3.0-3.3, dose = 20 mEq/hr x2 doses (40 mEq IV total dose). Recheck K+ level 2 hours after dose and the next AM.  If Serum K+ less than 3.0, dose = 20 mEq/hr x3 doses (60 mEq IV total dose). Recheck K+ level 2 hours after dose and the next AM.               potassium chloride SA (K-DUR/KLOR-CON M) CR tablet 20-40 mEq  Dose: 20-40 mEq Freq: EVERY 2 HOURS PRN Route: PO  PRN Reason: potassium supplementation  Start: 10/03/17 1045   Admin Instructions: Use if able to take PO.   If Serum K+ 3.0-3.3, dose = 60 mEq po total dose (40 mEq x1 followed in 2 hours by 20 mEq x1). Recheck K+ level 4 hours after dose and the next AM.  If Serum K+ 2.5-2.9, dose = 80 mEq po total dose (40 mEq Q2H x2). Recheck K+ level 4 hours after dose and the next AM.  If Serum K+ less than 2.5, See IV  order.  DO NOT CRUSH               QUEtiapine (SEROquel) half-tab 12.5 mg  Dose: 12.5 mg Freq: 2 TIMES DAILY PRN Route: PO  PRN Comment: agitation  Start: 10/05/17 1123              senna-docusate (SENOKOT-S;PERICOLACE) 8.6-50 MG per tablet 1-2 tablet  Dose: 1-2 tablet Freq: 2 TIMES DAILY PRN Route: PO  PRN Comment: constipation   Start: 10/03/17 1045   Admin Instructions: If no bowel movement in 24 hours, increase to 2 tablets PO BID.  Hold for loose stools.   This is the first step of a three step constipation treatment protocol.               sodium chloride (PF) 0.9% PF flush 3 mL  Dose: 3 mL Freq: EVERY 8 HOURS Route: IK  Start: 10/03/17 1100   Admin Instructions: And Q1H PRN, to lock peripheral IV dormant line.       (1102)-Not Given       1945 (3 mL)-Given        0844 (3 mL)-Given       1651 (3 mL)-Given       2334 (3 mL)-Given        0940 (3 mL)-Given       2204 (3 mL)-Given       2355 (3 mL)-Given        0848 (3 mL)-Given       1657 (3 mL)-Given       2343 (3 mL)-Given        0801 (3 mL)-Given       [ ] 1600           sodium chloride (PF) 0.9% PF flush 3 mL  Dose: 3 mL Freq: EVERY 1 HOUR PRN Route: IK  PRN Reason: line flush  PRN Comment: for peripheral IV flush post IV meds  Start: 10/03/17 1045             Completed Medications  Medications 10/01/17 10/02/17 10/03/17 10/04/17 10/05/17 10/06/17 10/07/17         Dose: 0.5 mL Freq: PRIOR TO DISCHARGE Route: IM  Start: 10/04/17 1000   End: 10/05/17 1110   Admin Instructions: Administer when afebrile (less than 100.4F) x 24 hours, or Prior to Discharge.  If not administering when scheduled , change the due time by following the instructions in the reference link below.  If patient refuses vaccine, chart as Vaccine Refused.         1110 (0.5 mL)-Given               Dose: 12.5 mg Freq: ONCE Route: PO  Start: 10/05/17 1130   End: 10/05/17 1137        1137 (12.5 mg)-Given            Discontinued Medications  Medications 10/01/17 10/02/17 10/03/17 10/04/17 10/05/17  10/06/17 10/07/17         Dose: 2.5-5 mg Freq: EVERY 4 HOURS PRN Route: IV  PRN Reason: other  PRN Comment: HR > 120, hold for SBP < 90  Start: 10/05/17 0025   End: 10/06/17 1049        0033 (2.5 mg)-Given [C]       0140 (2.5 mg)-Given       2310 (2.5 mg)-Given       2350 (2.5 mg)-Given        1049-Med Discontinued          Dose: 25 mg Freq: 2 TIMES DAILY Route: PO  Start: 10/06/17 2100   End: 10/07/17 0742   Admin Instructions: DO NOT CRUSH. Tablet may be split in half along score line.          2050 (25 mg)-Given        0742-Med Discontinued         Dose: 25 mg Freq: DAILY Route: PO  Start: 10/04/17 1745   End: 10/06/17 1141   Admin Instructions: DO NOT CRUSH. Tablet may be split in half along score line.        1935 (25 mg)-Given        0933 (25 mg)-Given        0846 (25 mg)-Given       1141-Med Discontinued          Dose: 12.5 mg Freq: 2 TIMES DAILY PRN Route: PO  PRN Comment: agitation  Start: 10/06/17 1050   End: 10/06/17 1120         1120-Med Discontinued

## 2017-10-03 NOTE — PLAN OF CARE
Problem: Patient Care Overview  Goal: Plan of Care/Patient Progress Review  OT and PT: Orders rec'd. Patient just admitted to ICU. Holding evals today to allow for medical progress.

## 2017-10-03 NOTE — IP AVS SNAPSHOT
` `     [facility]: [phone]                 INTERAGENCY TRANSFER FORM - NOTES (H&P, Discharge Summary, Consults, Procedures, Therapies)   10/3/2017                    Hospital Admission Date: 10/3/2017  KIMBERLY ERAZO   : 1928  Sex: Male        Patient PCP Information     Provider PCP Type    Physician No Ref-Primary General      History & Physicals     No notes of this type exist for this encounter.      Discharge Summaries     No notes of this type exist for this encounter.         Consult Notes      Consults by Rashawn Garcia PA-C at 10/3/2017 10:32 AM     Author:  Rashawn Garcia PA-C Service:  Neurosurgery Author Type:  Physician Assistant - LYNN    Filed:  10/3/2017 10:38 AM Date of Service:  10/3/2017 10:32 AM Creation Time:  10/3/2017 10:32 AM    Status:  Attested :  Rashawn Garcia PA-C (Physician Assistant - LYNN)    Cosigner:  Ignacio Masters MD at 10/3/2017 10:38 AM        Attestation signed by Ignacio Masters MD at 10/3/2017 10:38 AM        Physician Attestation   I agree with the information in this note.    Ignacio Masters                               [facility]    Neurosurgery Consultation     Date of Admission:  10/3/2017  Date of Consult (When I saw the patient):[LG1.1] 10/03/17[LG1.2]    Assessment & Plan   Kimberly Erazo is a 89 year old male who was admitted on 10/3/2017. I was asked to see the patient for IVH.    Active Problems:    IVH-reverse coumadin. CT in am. Non surgical at this time.       I have discussed the following assessment and plan with Dr. Masters who is in agreement with initial plan and will follow up with further consultation recommendations.    Rashawn Garcia PA-C  [facility]  [address]  [address] [place] [id number]    Tel [phone]  Pager [phone]        Code Status    Prior    Reason for Consult   Reason for consult: IVH    Primary Care Physician    Physician No Ref-Primary    Chief Complaint   IVH    History is obtained from the patient, electronic health record and patient's family    History of Present Illness   Rell Erazo is a 89 year old male who presents with IVH, following at fall at home. He does not remember falling. He denies any headache now or previously. Per other family at bedside, he is a very poor historian. Large ecchymosis around right eye. On coumadin since 2016 for afib.     Past Medical History   I have reviewed this patient's medical history and updated it with pertinent information if needed.[LG1.1]   Past Medical History:   Diagnosis Date     CAD (coronary artery disease)     S/P 3-vessel CABG 2012     CVA (cerebral infarction) 2013    Small, bifrontal CVA     Hyperlipidemia LDL goal < 100[LG1.3]        Past Surgical History   I have reviewed this patient's surgical history and updated it with pertinent information if needed.[LG1.1]  Past Surgical History:   Procedure Laterality Date     CARDIAC SURGERY  4/2012    x3 CABG[LG1.3]       Prior to Admission Medications   Prior to Admission Medications   Prescriptions Last Dose Informant Patient Reported? Taking?   Atorvastatin Calcium (LIPITOR PO) 10/2/2017 at Unknown time Daughter Yes Yes   Sig: Take 20 mg by mouth daily   metoprolol (TOPROL-XL) 25 MG 24 hr tablet 10/2/2017 at Unknown time Daughter No Yes   Sig: Take 1 tablet (25 mg) by mouth daily   warfarin (COUMADIN) 5 MG tablet 10/2/2017 at Unknown time Daughter No Yes   Sig: Take 1 tablet (5 mg) by mouth daily      Facility-Administered Medications: None     Allergies   No Known Allergies    Social History   I have reviewed this patient's social history and updated it with pertinent information if needed. Rell Erazo[LG1.1]  reports that he has never smoked. He does not have any smokeless tobacco history on file. He reports that he drinks alcohol.[LG1.3]    Family History   I have reviewed this patient's family history and  "updated it with pertinent information if needed.[LG1.1]   No family history on file.[LG1.3]    Review of Systems   Per HPI and PMH    Physical Exam   Temp: 98.5  F (36.9  C) Temp src: Oral BP: 127/74 Pulse: 94 Heart Rate: 60 Resp: 13 SpO2: 96 % O2 Device: None (Room air)    Vital Signs with Ranges  Temp:  [98.5  F (36.9  C)] 98.5  F (36.9  C)  Pulse:  [94] 94  Heart Rate:  [60-63] 60  Resp:  [13-16] 13  BP: (118-156)/(66-80) 127/74  SpO2:  [95 %-96 %] 96 %  145 lbs 0 oz    Heart Rate: 60, Blood pressure 127/74, pulse 94, temperature 98.5  F (36.9  C), temperature source Oral, resp. rate 13, height 5' 10\" (1.778 m), weight 145 lb (65.8 kg), SpO2 96 %.  145 lbs 0 oz  HEENT:  Normocephalic, large ecchymosis around right eye.  PERRLA.  EOM s intact.   Neck:  Supple, non-tender, without lymphadenopathy.  Heart:  No peripheral edema  Lungs:  No SOB  Abdomen:  Soft, non-tender, non-distended.    Skin:  Warm and dry, good capillary refill.  Extremities:  Good radial and dorsalis pedis pulses bilaterally, no edema, cyanosis or clubbing.    NEUROLOGICAL EXAMINATION:     Mental status:  Alert and Oriented x 3, speech is fluent.  Cranial nerves:  II-XII intact.   Motor:  Strength is 5/5 throughout the upper and lower extremities  Shoulder Abduction:  Right:  5/5   Left:  5/5  Biceps:                      Right:  5/5   Left:  5/5  Triceps:                     Right:  5/5   Left:  5/5  Wrist Extensors:       Right:  5/5   Left:  5/5  Wrist Flexors:           Right:  5/5   Left:  5/5  interosseus :            Right:  5/5   Left:  5/5  Sensation:  intact  Reflexes:   Negative Babinski.  Negative Clonus.    Coordination:  Smooth finger to nose testing.   Negative pronator drift.   Gait:not checked        Data   All new lab and imaging data was personally reviewed by me.  CT:IMPRESSION:  1. Small amount of acute intraventricular hemorrhage layering in the  posterior horn of the left lateral ventricle.  2. Cerebral atrophy with " chronic white matter changes.  3. Right supraorbital scalp hematoma. No evidence for fracture.  CBC RESULTS:   Recent Labs   Lab Test  10/03/17   0900   WBC  6.4   RBC  4.56   HGB  15.1   HCT  42.4   MCV  93   MCH  33.1*   MCHC  35.6   RDW  12.9   PLT  201     Basic Metabolic Panel:  Lab Results   Component Value Date     10/03/2017      Lab Results   Component Value Date    POTASSIUM 4.3 10/03/2017     Lab Results   Component Value Date    CHLORIDE 105 10/03/2017     Lab Results   Component Value Date    MING 8.7 10/03/2017     Lab Results   Component Value Date    CO2 27 10/03/2017     Lab Results   Component Value Date    BUN 18 10/03/2017     Lab Results   Component Value Date    CR 1.07 10/03/2017     Lab Results   Component Value Date    GLC 99 10/03/2017     INR:  Lab Results   Component Value Date    INR 2.60 10/03/2017    INR 1.71 03/14/2017    INR 1.84 03/13/2017    INR 2.42 03/12/2017    INR 2.04 03/11/2017[LG1.1]            Revision History        User Key Date/Time User Provider Type Action    > LG1.2 10/3/2017 10:38 AM Rashawn Garcia PA-C Physician Assistant - LYNN Sign     LG1.3 10/3/2017 10:36 AM Rashawn Garcia PA-C Physician Assistant - C      LG1.1 10/3/2017 10:32 AM Rashawn Garcia PA-C Physician Assistant - C                      Progress Notes - Physician (Notes from 10/04/17 through 10/07/17)      Progress Notes by Marysol Herring at 10/7/2017 12:38 PM     Author:  Marysol Herring Service:  (none) Author Type:      Filed:  10/7/2017 12:42 PM Date of Service:  10/7/2017 12:38 PM Creation Time:  10/7/2017 12:38 PM    Status:  Signed :  Marysol Herring ()         Social Work Progress Note  Pt chart reviewed. Pt discussed in interdisciplinary rounds.     Intervention: Pt was accepted at [facility]. Pt's daughter Selam prefers [facility]. Writer cancelled the bed at [facility]. Selam confirmed that she is amenable to the $36.00 per day  fee at [facility]. Selam plans to transport pt today between [phone]. Writer faxed d/c orders and PAS-R to [facility].     PAS-RR    D: Per DHS regulation, LEAH completed and submitted PAS-RR to [place] [employer] on [employer]ging Direct Connect via the Senior LinkAge Line.  PAS-RR confirmation # is : [id number]    P: Further questions may be directed to Senior LinkAge Line at #[phone], option #4 for PAS-RR staff.      Team members notified: Bedside RN, CC, &HUC     Plan:  Anticipated Discharge Placement: [facility] (P: [phone]; F: [phone])  Barriers: None identified at this time.   Follow-up plan:  No further plans to follow. Sw available should a need arise.     PRUDENCE Rodarte, RADHA  [phone][NB1.1]  12:42 PM[NB1.2]     Revision History        User Key Date/Time User Provider Type Action    > NB1.2 10/7/2017 12:42 PM Marysol Herring  Sign     NB1.1 10/7/2017 12:38 PM Marysol Herring              Progress Notes by Danni Loaiza LSW at 10/6/2017  2:46 PM     Author:  Danni Loaiza LSW Service:  Social Work Author Type:      Filed:  10/6/2017  2:47 PM Date of Service:  10/6/2017  2:46 PM Creation Time:  10/6/2017  2:46 PM    Status:  Signed :  Danni Loaiza LSW ()         D: LEAH following for DC planning. Anticipate DC Saturday.  I: Pt has been accepted at [facility]. Family prefers a double room, bed is available anytime and the [facility] team will follow. [facility] can assess on Saturday if they have an opening. LEAH updated pts dtr Selam. She is agreeable to this plan. She will transport.   P: SW will follow.     PRUDENCE Helton, [facility]  [id number][JJ1.1]     Revision History        User Key Date/Time User Provider Type Action    > JJ1.1 10/6/2017  2:47 PM Danni Loaiza LSW  Sign            Progress Notes by Wesley Lama MD at  10/6/2017 10:51 AM     Author:  Wesley Lama MD Service:  Hospitalist Author Type:  Physician    Filed:  10/6/2017 11:44 AM Date of Service:  10/6/2017 10:51 AM Creation Time:  10/6/2017 10:51 AM    Status:  Signed :  Wesley Lama MD (Physician)         [facility]    Hospitalist Progress Note    Assessment & Plan   Mr. Erazo is an 89-year-old male with history of coronary artery disease, atrial fibrillation on chronic Coumadin, cerebrovascular accident, hyperlipidemia who presented to the emergency room with suspected fall and was found to have intracranial hemorrhage.     Suspected mechanical fall resulting in traumatic intracranial hemorrhage.    -There were no witnesses however, the patient does have significant periorbital bruising.   - It is suspected that the patient sustained a fall and had a traumatic intraventricular hemorrhage.  Likely mechanical as per the daughter he has been having balance issues lately.  -The patient has no recollection of this event.    -Repeat head CT- stable  -Neurosurgery-has signed off  -physical therapy and occupationalt therapy following     History of atrial fibrillation on chronic anticoagulation.    -INR on presentation 2.6; reversed with vitamin K and Kcentra  -Continue to hold Coumadin, per NS- Continue to hold Coumadin and all blood thinners due to patient's risk of recurring falls; if must restart wait at least 2 weeks[NB1.1]  -Intermittent RVR including last night requiring IV metoprolol[NB1.2]  -[NB1.1]Increase[NB1.2] PTA metoprolol XL[NB1.1] to 25 mg BID[NB1.2]  -PRN[NB1.1] PO[NB1.2] metoprolol available    Coronary artery disease history.    -Continue prior to admission simvastatin and metoprolol     Multiple bruises.[NB1.1]    -periorbital bruising stable, also has a right subconjunctival hge[NB1.2]  -[NB1.1]Stable[NB1.2] bruises on bilateral elbows and back.    -PT/OT eval    Mild dementia:  -Reportedly patient does have mild  dementia, patient was confused and agitated[NB1.1] 10/4[NB1.2]  -Appears to be much calmer this AM[NB1.1]  -Seroquel prn available[NB1.2]      D/W: RN  DVT Prophylaxis: Pneumatic Compression Devices  Code Status: DNR/DNI    Disposition: Expected discharge 10/[NB1.1]7[NB1.2] to TCU    Wesley Lama MD    Interval History[NB1.1]   A. fib with RVR overnight requiring IV metoprolol.  Continues to be calm since yesterday morning.  No acute nursing concern[NB1.2]    -Data reviewed today: I reviewed all new labs and imaging results over the last 24 hours. I personally reviewed no images or EKG's today.    Physical Exam   Temp: 98.1  F (36.7  C) Temp src: Oral BP: 109/58 Pulse: 75 Heart Rate: 88 Resp: 16 SpO2: 96 % O2 Device: None (Room air)    Vitals:    10/03/17 0900 10/04/17 0600   Weight: 65.8 kg (145 lb) 59.4 kg (130 lb 15.3 oz)     Vital Signs with Ranges  Temp:  [98.1  F (36.7  C)-98.6  F (37  C)] 98.1  F (36.7  C)  Pulse:  [75] 75  Heart Rate:  [] 88  Resp:  [16] 16  BP: (103-145)/(58-80) 109/58  SpO2:  [95 %-99 %] 96 %  I/O last 3 completed shifts:  In: 600 [P.O.:600]  Out: -     Constitutional: AAOX3, NAD, Appears comfortable  HEENT: Right periorbital bruise-unchanged[NB1.1]; rt supple conjunctival hemorrhage +;  visual equity intact on right side-on gross bedside testing[NB1.2]  Respiratory:  No crackles, No wheezes, CTA B/L, Normal WOB  Cardiovascular: Irregular; No murmur  GI: Soft, Non- tender, BS- normoactive, No Guarding/rebound/rigidity  Skin/Integument: Warm and dry, no rashes  MSK: No joint deformity or swelling, no edema  Neuro: CN- grossly intact    Medications        metoprolol  25 mg Oral Daily     sodium chloride (PF)  3 mL Intracatheter Q8H     atorvastatin (LIPITOR) tablet 20 mg  20 mg Oral Daily       Data     Recent Labs  Lab 10/05/17  0753 10/04/17  0430 10/03/17  1146 10/03/17  0900   WBC  --  6.7  --  6.4   HGB  --  13.3  --  15.1   MCV  --  93  --  93   PLT  --  176  --  201   INR   --  1.04 1.15* 2.60*   NA  --  140  --  139   POTASSIUM 4.3 4.2  --  4.3   CHLORIDE  --  106  --  105   CO2  --  28  --  27   BUN  --  20  --  18   CR  --  1.04  --  1.07   ANIONGAP  --  6  --  7   MING  --  8.2*  --  8.7   GLC  --  107*  --  99   ALBUMIN  --   --   --  3.9   PROTTOTAL  --   --   --  7.7   BILITOTAL  --   --   --  0.8   ALKPHOS  --   --   --  103   ALT  --   --   --  39   AST  --   --   --  51*   TROPI  --   --   --  <0.015       No results found for this or any previous visit (from the past 24 hour(s)).[NB1.1]     Revision History        User Key Date/Time User Provider Type Action    > NB1.2 10/6/2017 11:44 AM Wesley Lama MD Physician Sign     NB1.1 10/6/2017 10:51 AM Wesley Lama MD Physician             Progress Notes by Lorie Fields RN at 10/5/2017 11:14 PM     Author:  Lorie Fields RN Service:  (none) Author Type:  Registered Nurse    Filed:  10/5/2017 11:14 PM Date of Service:  10/5/2017 11:14 PM Creation Time:  10/5/2017 11:14 PM    Status:  Signed :  Lorie Fields RN (Registered Nurse)         Pt sustaining A fib RVR for 10 minutes, gave 2.5mg of IV metoprolol.[MH1.1]     Revision History        User Key Date/Time User Provider Type Action    > MH1.1 10/5/2017 11:14 PM Lorie Fields, RN Registered Nurse Sign            Progress Notes by Peggy Chapa at 10/5/2017  2:34 PM     Author:  Peggy Chapa Service:  Social Work Author Type:      Filed:  10/5/2017  4:28 PM Date of Service:  10/5/2017  2:34 PM Creation Time:  10/5/2017  2:34 PM    Status:  Addendum :  Peggy Chapa ()         ERICA    I:  SW placed call to patient's daughter, Selam, to discuss discharge plan.  Dtr confirms patient's spouse is currently living at [facility].  SW reviewed therapy recommendation for TCU. SW also updated daughter patient has shown an increase in confusion, thus an additional recommendation has been made for d/c to a Memory  "Care TCU.  Daughter stated she would not want patient discharged to [facility] or [facility] TCU for Memory Care but  would consider [facility], if applicable.  Daughter requests if patient can discharge to a \"regular\" TCU, they request [facility], accepting the extra daily cost for private room.  Dtr stated patient does have a history of increased confusion while in a hospital setting, and feels \"once out of the hospital, he may be clearer\".   Thus, daughter is hopeful patient can discharge to [facility].  Daughter also stated her second TCU choice would be: [facility].  LEAH explained at this point, referrals will be sent out to all facilities we discussed today.  SW will continue to follow and update daughter accordingly.  SW placed referrals thru [facility] and also sent email to [facility], per protocol.    P:  Continue to assist with discharge planning as needed.    MEENA Quintero[CS1.1]    UPDATE@1627: LEAH received call from patient's home care agency:  [facility], [place] , asking for update which was provided.[CS1.2]       Revision History        User Key Date/Time User Provider Type Action    > CS1.2 10/5/2017  4:28 PM Peggy Chapa  Addend     CS1.1 10/5/2017  2:43 PM Peggy Chapa  Sign            Progress Notes by Wesley Lama MD at 10/5/2017 12:52 PM     Author:  Wesley Lama MD Service:  Hospitalist Author Type:  Physician    Filed:  10/5/2017  2:55 PM Date of Service:  10/5/2017 12:52 PM Creation Time:  10/5/2017 12:52 PM    Status:  Signed :  Wesley Lama MD (Physician)         [facility]    Hospitalist Progress Note    Assessment & Plan   Mr. Erazo is an 89-year-old male with history of coronary artery disease, atrial fibrillation on chronic Coumadin, cerebrovascular accident, hyperlipidemia who presented to the emergency room with suspected fall and was found to have " intracranial hemorrhage.     Suspected mechanical fall resulting in traumatic intracranial hemorrhage.    -There were no witnesses however, the patient does have significant periorbital bruising.   - It is suspected that the patient sustained a fall and had a traumatic intraventricular hemorrhage.  Likely mechanical as per the daughter he has been having balance issues lately.  -The patient has no recollection of this event.    -Repeat head CT[NB1.1]-[NB1.2] stable  -Neurosurgery-[NB1.1]has signed off[NB1.2]  -physical therapy and occupationalt therapy[NB1.1] following[NB1.2]     History of atrial fibrillation on chronic anticoagulation.    -INR on presentation 2.6; reversed with vitamin K and Kcentra  -Continue to hold Coumadin,[NB1.1] per NS- Continue to hold Coumadin and all blood thinners due to patient's risk of recurring falls; if must restart wait at least 2 weeks[NB1.2]  -[NB1.1]Continue[NB1.2] PTA metoprolol XL 25 mg daily[NB1.1]  -RVR overnight; but rate controlled at the moment  -PRN IV metoprolol available[NB1.2]    Coronary artery disease history.    -Continue prior to admission simvastatin and metoprolol     Multiple bruises.    -He does have bruises on bilateral elbows and a bruise on his back.    -PT/OT eval    Mild dementia:  -Reportedly patient does have mild dementia, patient was confused[NB1.1] and agitated[NB1.2] overnight[NB1.1] requiring sitter  -Start seroquel low dose scheduled and prn; DC sitter  -Appears to be much calmer this AM[NB1.2]      D/W: RN  DVT Prophylaxis: Pneumatic Compression Devices  Code Status: DNR/DNI    Disposition: Expected discharge[NB1.1] 10/6 to[NB1.2] TCU    Wesley Lama MD    Interval History[NB1.1]   C[NB1.2]onfused[NB1.1] and agitated[NB1.2] overnight.  Denies any headache or vision changes.  No focal weakness.[NB1.1] RVR overnight[NB1.2]      -Data reviewed today: I reviewed all new labs and imaging results over the last 24 hours. I personally reviewed no  images or EKG's today.    Physical Exam   Temp: 98.1  F (36.7  C) Temp src: Oral BP: 132/78   Heart Rate: 73 Resp: 16 SpO2: 99 % O2 Device: None (Room air)    Vitals:    10/03/17 0900 10/04/17 0600   Weight: 65.8 kg (145 lb) 59.4 kg (130 lb 15.3 oz)     Vital Signs with Ranges  Temp:  [97.6  F (36.4  C)-98.6  F (37  C)] 98.1  F (36.7  C)  Heart Rate:  [] 73  Resp:  [16-20] 16  BP: ()/(56-87) 132/78  SpO2:  [95 %-99 %] 99 %  I/O last 3 completed shifts:  In: 980 [P.O.:980]  Out: 200 [Urine:200]    Constitutional: AAOX3, NAD, Appears comfortable  HEENT: Right periorbital bruise-[NB1.1]unchanged[NB1.2]  Neck- Supple, Good ROM, No JVD  Respiratory:  No crackles, No wheezes, CTA B/L, Normal WOB  Cardiovascular:[NB1.1]Irregular;[NB1.2] No murmur  GI: Soft, Non- tender, BS- normoactive, No Guarding/rebound/rigidity  Skin/Integument: Warm and dry, no rashes  MSK: No joint deformity or swelling, no edema  Neuro: CN- grossly intact, Motor strength 5/5 on all 4 extremities.    Medications        metoprolol  25 mg Oral Daily     sodium chloride (PF)  3 mL Intracatheter Q8H     atorvastatin (LIPITOR) tablet 20 mg  20 mg Oral Daily       Data     Recent Labs  Lab 10/05/17  0753 10/04/17  0430 10/03/17  1146 10/03/17  0900   WBC  --  6.7  --  6.4   HGB  --  13.3  --  15.1   MCV  --  93  --  93   PLT  --  176  --  201   INR  --  1.04 1.15* 2.60*   NA  --  140  --  139   POTASSIUM 4.3 4.2  --  4.3   CHLORIDE  --  106  --  105   CO2  --  28  --  27   BUN  --  20  --  18   CR  --  1.04  --  1.07   ANIONGAP  --  6  --  7   MING  --  8.2*  --  8.7   GLC  --  107*  --  99   ALBUMIN  --   --   --  3.9   PROTTOTAL  --   --   --  7.7   BILITOTAL  --   --   --  0.8   ALKPHOS  --   --   --  103   ALT  --   --   --  39   AST  --   --   --  51*   TROPI  --   --   --  <0.015       No results found for this or any previous visit (from the past 24 hour(s)).[NB1.1]     Revision History        User Key Date/Time User Provider Type Action     > NB1.2 10/5/2017  2:55 PM Wesley Lama MD Physician Sign     NB1.1 10/5/2017 12:52 PM Wesley Lama MD Physician             Progress Notes by Nely Barreto RN at 10/5/2017 12:02 PM     Author:  Nely Barreto RN Service:  (none) Author Type:      Filed:  10/5/2017 12:15 PM Date of Service:  10/5/2017 12:02 PM Creation Time:  10/5/2017 12:02 PM    Status:  Signed :  Nely Barreto, RN ()         Discussed events of last night with Dr Lama. Pt was very confused, less so this am but still trying to get OOB impulsively and has a sitter.  He is being started on seroquel and nursing will see if sitter can be dc'd this afternoon.  Updated SW. CM will continue to assist w/ dc planning.[GN1.1]       Revision History        User Key Date/Time User Provider Type Action    > GN1.1 10/5/2017 12:15 PM Nely Barreto, RN Case Manager Sign            Progress Notes by Ning Gaspar PT at 10/5/2017  9:01 AM     Author:  Ning Gaspar PT Service:  Physical Medicine and Rehabilitation Author Type:  Physical Therapist    Filed:  10/5/2017  9:01 AM Date of Service:  10/5/2017  9:01 AM Creation Time:  10/5/2017  9:01 AM    Status:  Signed :  Ning Gaspar PT (Physical Therapist)            10/05/17 0800   Signing Clinician's Name / Credentials   Signing clinician's name / credentials Ning Gaspar PT   Henley Balance Scale (NEFTALI HERNANDEZ, UZMA S, JAMES SUTTON, RAFIQ, B: MEASURING BALANCE IN THE ELDERLY: VALIDATION OF AN INSTRUMENT. CAN. J. PUB. HEALTH, JULY/AUGUST SUPPLEMENT 2:S7-11, 1992.)   Sit To Stand 4   Standing Unsupported 4   Sitting Unsupported 4   Stand to Sit 4   Transfers 3   Standing with Eyes Closed 4   Standing Unsupported, Feet Together 2   Reach Forward With Outstretched Arm 3   Retrieve Object From Floor 3   Turning to Look Behind 2   Turn 360 Degrees 3   Placing Alternate Foot on Stool (4-6 inches) 1   Unsupported Tandem Stand (Demonstrate to Subject) 0  "  One Leg Stand 0   Total Score (A score of 45 or less has been correlated with an increased risk of falls)   Total Score (out of 56) 37[SF1.1]        Revision History        User Key Date/Time User Provider Type Action    > SF1.1 10/5/2017  9:01 AM Ning Gaspar PT Physical Therapist Sign            Progress Notes by Amilcar Zavala MD at 10/5/2017 12:25 AM     Author:  Amilcar Zavala MD Service:  Hospitalist Author Type:  Physician    Filed:  10/5/2017 12:27 AM Date of Service:  10/5/2017 12:25 AM Creation Time:  10/5/2017 12:25 AM    Status:  Signed :  Amilcar Zavala MD (Physician)         Hospitalist cross cover    Paged for A-fib with RVR ranging 110-130's.  Patient sleeping comfortably per RN.  Will order prn IV metoprolol for rates >120.  Will check K and mag with AM draw.[PB1.1]     Revision History        User Key Date/Time User Provider Type Action    > PB1.1 10/5/2017 12:27 AM Amilcar Zavala MD Physician Sign            Progress Notes by Indy Del Castillo PT at 10/4/2017  5:25 PM     Author:  Indy Del Castillo PT Service:  (none) Author Type:  Physical Therapist    Filed:  10/4/2017  5:25 PM Date of Service:  10/4/2017  5:25 PM Creation Time:  10/4/2017  5:25 PM    Status:  Signed :  Indy Del Castillo PT (Physical Therapist)          10/04/17 1522   Quick Adds   Type of Visit Initial PT Evaluation   Living Environment   Lives With alone  (\"My Wife is in a nursing home.\" Cats \"Oagie and Tubey\")   Living Arrangements house   Home Accessibility stairs to enter home;stairs within home;tub/shower is not walk in   Number of Stairs to Enter Home 4   Number of Stairs Within Home 15   Stair Railings at Home other (see comments)   Living Environment Comment [place] style home   Self-Care   Dominant Hand right   Usual Activity Tolerance good   Current Activity Tolerance fair   Activity/Exercise/Self-Care Comment Pt reports Daugher assists with laundry now that hi wife is in the NH. "   Functional Level Prior   Ambulation 1-->assistive equipment   Transferring 0-->independent   Toileting 0-->independent   Bathing 0-->independent   Dressing 0-->independent   Eating 0-->independent   Communication 0-->understands/communicates without difficulty   Swallowing 0-->swallows foods/liquids without difficulty   Cognition 0 - no cognition issues reported   Fall history within last six months yes   Number of times patient has fallen within last six months 1   Which of the above functional risks had a recent onset or change? ambulation   Prior Functional Level Comment Per chart pt has a caregiver during the day; wife was presnet at admit; however, pt states she lives in a NH. Prior notes indicate pt's wfie is wc bound. Pt's daughter involved in care, not present at [facility].   General Information   Onset of Illness/Injury or Date of Surgery - Date 10/03/17   Referring Physician Wesley Lama MD   Patient/Family Goals Statement None stated.    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 Per H&P pt with recent balance problems per Daughter.  Pt on coumadin at time of the fall.   Precautions/Limitations fall precautions   General Observations R eye black and blue   Cognitive Status Examination   Orientation person;place;time  (not situation, place waxes and wanes)   Level of Consciousness alert   Follows Commands and Answers Questions 100% of the time;able to follow single-step instructions   Personal Safety and Judgment impulsive   Cognitive Comment Per chart dementia   Integumentary/Edema   Integumentary/Edema Comments Black R eye    Posture    Posture Forward head position;Protracted shoulders   Range of Motion (ROM)   ROM Comment B LEs WFL as obs via functional mobiltiy   Strength   Strength Comments  "Demonstrates antigravity strength, MMTs not assessedas pt eager to get up and move.    Bed Mobility   Bed Mobility Comments SBA sup>sit   Transfer Skills   Transfer Comments CGA sit<>stand   Gait   Gait Comments Demonstrates shuffling gait, minimal clearance with feet, flexted trunk with bed<>bathroom gait, with WW. CGA, Min A to navigate walke rin bathroom.   Balance   Balance Comments Able to lean down t othe floor and tie shoes without LOB   Sensory Examination   Sensory Perception Comments Deneis n/t   General Therapy Interventions   Planned Therapy Interventions balance training;bed mobility training;gait training;neuromuscular re-education;ROM;strengthening;stretching;transfer training;progressive activity/exercise;home program guidelines   Clinical Impression   Criteria for Skilled Therapeutic Intervention yes, treatment indicated   PT Diagnosis Impaired Gait   Influenced by the following impairments Generalized weakness, decreaed activity toelrance, decreased balance in standing with dynamic activity   Functional limitations due to impairments Decreaesd functional independence   Clinical Presentation Stable/Uncomplicated   Clinical Presentation Rationale seeabove   Clinical Decision Making (Complexity) Low complexity   Therapy Frequency` daily   Predicted Duration of Therapy Intervention (days/wks) 4 days   Anticipated Discharge Disposition Transitional Care Facility   Risk & Benefits of therapy have been explained Yes   Patient, Family & other staff in agreement with plan of care Yes   [facility] [employer] TM \"6 Clicks\"   2016, Trustees of [facility], under license to [employer].  All rights reserved.   6 Clicks Short Forms Basic Mobility Inpatient Short Form   [facility] AM-PAC  \"6 Clicks\" V.2 Basic Mobility Inpatient Short Form   1. Turning from your back to your side while in a flat bed without using bedrails? 3 - A Little   2. Moving from lying on your back to sitting on the side of " a flat bed without using bedrails? 3 - A Little   3. Moving to and from a bed to a chair (including a wheelchair)? 3 - A Little   4. Standing up from a chair using your arms (e.g., wheelchair, or bedside chair)? 3 - A Little   5. To walk in hospital room? 3 - A Little   6. Climbing 3-5 steps with a railing? 3 - A Little   Basic Mobility Raw Score (Score out of 24.Lower scores equate to lower levels of function) 18   Total Evaluation Time   Total Evaluation Time (Minutes) 10[SS1.1]        Revision History        User Key Date/Time User Provider Type Action    > SS1.1 10/4/2017  5:25 PM Indy Del Castillo, PT Physical Therapist Sign            Progress Notes by Danni Mistry OT at 10/4/2017 12:45 PM     Author:  Danni Mistry OT Service:  (none) Author Type:  Occupational Therapist    Filed:  10/4/2017  4:45 PM Date of Service:  10/4/2017 12:45 PM Creation Time:  10/4/2017  4:45 PM    Status:  Signed :  Danni Mistry OT (Occupational Therapist)            10/04/17 1056   Quick Adds   Type of Visit Initial Occupational Therapy Evaluation   Living Environment   Lives With alone   Living Arrangements house   Number of Stairs to Enter Home 4   Number of Stairs Within Home 15   Self-Care   Dominant Hand right   General Information   Onset of Illness/Injury or Date of Surgery - Date 10/03/17   Referring Physician Paola   Patient/Family Goals Statement Go home.   Additional Occupational Profile Info/Pertinent History of Current Problem Admitted after fall that he doesn't remember. Dx traumatic ICH.   Precautions/Limitations fall precautions   Cognitive Status Examination   Orientation person;place   Level of Consciousness alert   Able to Follow Commands WNL/WFL   Personal Safety (Cognitive) at risk behaviors demonstrated   Memory impaired   Cognitive Comment Unoriented to month, day, date. No recall of information after short delay.    Pain Assessment   Patient Currently in Pain No   Range of  "Motion (ROM)   ROM Quick Adds No deficits were identified   Strength   Manual Muscle Testing Quick Adds No deficits were identified   Mobility   Bed Mobility Comments SB A   Transfer Skill: Bed to Chair/Chair to Bed   Level of Clark: Bed to Chair minimum assist (75% patients effort)   Transfer Skill: Sit to Stand   Level of [place]: Sit/Stand minimum assist (75% patients effort)   Balance   Balance Comments Unsteady on his feet. Reaches out for furniture to steady self.    Lower Body Dressing   Level of [place]: Dress Lower Body stand-by assist   Grooming   Level of [place]: Grooming contact guard   Eating/Self Feeding   Level of [place]: Eating independent   Activities of Daily Living Analysis   Impairments Contributing to Impaired Activities of Daily Living balance impaired;cognition impaired   General Therapy Interventions   Planned Therapy Interventions ADL retraining;cognition;transfer training   Clinical Impression   Criteria for Skilled Therapeutic Interventions Met yes, treatment indicated   OT Diagnosis Decreased ADL   Influenced by the following impairments balance, cognition   Assessment of Occupational Performance 1-3 Performance Deficits   Identified Performance Deficits dressing, transfers, IADL   Clinical Decision Making (Complexity) Low complexity   Therapy Frequency daily   Predicted Duration of Therapy Intervention (days/wks) 3 days   Anticipated Discharge Disposition Transitional Care Facility  (see POC note)   Risks and Benefits of Treatment have been explained. Yes   Patient, Family & other staff in agreement with plan of care Yes   [facility] [employer]-PAC TM \"6 Clicks\"   2016, Trustees of [facility], under license to [employer].  All rights reserved.   6 Clicks Short Forms Daily Activity Inpatient Short Form   [facility] AM-PAC  \"6 Clicks\" Daily Activity Inpatient Short Form   1. Putting on and taking off regular lower body clothing? 3 - A Little   2. " Bathing (including washing, rinsing, drying)? 3 - A Little   3. Toileting, which includes using toilet, bedpan or urinal? 3 - A Little   4. Putting on and taking off regular upper body clothing? 3 - A Little   5. Taking care of personal grooming such as brushing teeth? 3 - A Little   6. Eating meals? 4 - None   Daily Activity Raw Score (Score out of 24.Lower scores equate to lower levels of function) 19   Total Evaluation Time   Total Evaluation Time (Minutes) 10[JM1.1]        Revision History        User Key Date/Time User Provider Type Action    > JM1.1 10/4/2017  4:45 PM Danni Mistry, OT Occupational Therapist Sign            Progress Notes by Giselle Flores NP at 10/4/2017  9:40 AM     Author:  Giselle Flores NP Service:  Neurosurgery Author Type:  Nurse Practitioner    Filed:  10/4/2017 11:46 AM Date of Service:  10/4/2017  9:40 AM Creation Time:  10/4/2017  9:40 AM    Status:  Addendum :  Giselle Flores NP (Nurse Practitioner)         [facility]    Neurosurgery Progress Note    Date of Service (when I saw the patient): 10/04/2017     Assessment & Plan   Rell Erazo is an 89 year old male who was admitted on 10/3/2017. 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  The patient does not remember falling, however, blood noted on the wall at home and patient with bruising and edema to the right eye.  He denies any pain.  He denies headache or dizziness. He is able to track conversation and answers most questions appropriately while sitting up in the chair. He finished breakfast and denies N/V.  Per staff he is baseline for being somewhat confused.  CT today is stable with no evidence of new or increasing intracranial hemorrhage.  The patient had been on coumadin since 2016 for afib which was held on admission.  Per Dr. Masters recommend stopping blood thinners due to patient's risk for recurring  "falls, however, if it is felt that patient must be restarted the recommendation is to wait at least two weeks before restarting.    Active Problems:    ICH (intracerebral hemorrhage) (H)    Assessment: IVH; stable    Plan: Continue to hold Coumadin and all blood thinners due to patient's risk of recurring falls; if must restart wait at least 2 weeks.  Patient's CT is stable.  Will [facility] with repeat CT of head in 4 weeks.[LB1.1] NSG will sign off at this time, please contact us if any further questions.[LB1.2]    I have discussed the following assessment and plan with Dr. Ignacio Masters who is in agreement with initial plan and will follow up with further consultation recommendations.    Giselle Flores [facility]  [facility]  [address]  [address] [place] [id number]    Tel [phone]  Pager [phone]      Interval History   Stable    Physical Exam   Temp: 98.3  F (36.8  C) Temp src: Axillary BP: 131/67   Heart Rate: 82 Resp: 20 SpO2: 97 % O2 Device: None (Room air)    Vitals:    10/03/17 0900 10/04/17 0600   Weight: 145 lb (65.8 kg) 130 lb 15.3 oz (59.4 kg)     Vital Signs with Ranges  Temp:  [98  F (36.7  C)-98.4  F (36.9  C)] 98.3  F (36.8  C)  Heart Rate:  [60-94] 82  Resp:  [6-22] 20  BP: (106-180)/(57-97) 131/67  SpO2:  [93 %-98 %] 97 %  I/O last 3 completed shifts:  In: 1108.33 [P.O.:600; I.V.:508.33]  Out: 1000 [Urine:1000]    Heart Rate: 82, Blood pressure 131/67, pulse 94, temperature 98.3  F (36.8  C), temperature source Axillary, resp. rate 20, height 5' 10\" (1.778 m), weight 130 lb 15.3 oz (59.4 kg), SpO2 97 %.  130 lbs 15.25 oz  HEENT:  Normocephalic.  Right periorbital bruising and swelling.  PERRLA.  EOM s intact.    Neck:  Supple, non-tender, without lymphadenopathy.  Heart:  No peripheral edema  Lungs:  No SOB  Abdomen:  Soft, non-tender, non-distended.   Skin:  Warm and dry, good capillary refill.  Extremities:  Good " radial and dorsalis pedis pulses bilaterally, no edema, cyanosis or clubbing.    NEUROLOGICAL EXAMINATION:     Mental status: Awake and alert, answers questions appropriately, speech is fluent.  Cranial nerves:  II-XII intact.   Motor:  Strength is 5/5 throughout the upper and lower extremities  Shoulder Abduction:  Right:  5/5   Left:  5/5  Biceps:                      Right:  5/5   Left:  5/5  Triceps:                     Right:  5/5   Left:  5/5  Wrist Extensors:       Right:  5/5   Left:  5/5  Wrist Flexors:           Right:  5/5   Left:  5/5  interosseus :            Right:  5/5   Left:  5/5  Hip Flexor:                Right: 5/5  Left:  5/5  Hip Adductor:             Right:  5/5  Left:  5/5  Hip Abductor:             Right:  5/5  Left:  5/5  Gastroc Soleus:        Right:  5/5  Left:  5/5  Tib/Ant:                      Right:  5/5  Left:  5/5  EHL:                     Right:  5/5  Left:  5/5  Sensation:  intact  Reflexes:  Negative Babinski.  Negative Clonus.    Coordination:  Smooth finger to nose testing.   Negative pronator drift.   Gait:  Sitting up in chair; appears comfortable.     Medications        sodium chloride (PF)  3 mL Intracatheter Q8H     atorvastatin (LIPITOR) tablet 20 mg  20 mg Oral Daily     influenza Vac Split High-Dose  0.5 mL Intramuscular Prior to discharge       Data     CBC RESULTS:   Recent Labs   Lab Test  10/04/17   0430   WBC  6.7   RBC  4.03*   HGB  13.3   HCT  37.6*   MCV  93   MCH  33.0   MCHC  35.4   RDW  12.9   PLT  176     Basic Metabolic Panel:  Lab Results   Component Value Date     10/04/2017      Lab Results   Component Value Date    POTASSIUM 4.2 10/04/2017     Lab Results   Component Value Date    CHLORIDE 106 10/04/2017     Lab Results   Component Value Date    MING 8.2 10/04/2017     Lab Results   Component Value Date    CO2 28 10/04/2017     Lab Results   Component Value Date    BUN 20 10/04/2017     Lab Results   Component Value Date    CR 1.04 10/04/2017      Lab Results   Component Value Date     10/04/2017     INR:  Lab Results   Component Value Date    INR 1.04 10/04/2017    INR 1.15 10/03/2017    INR 2.60 10/03/2017    INR 1.71 03/14/2017    INR 1.84 03/13/2017    INR 2.42 03/12/2017    INR 2.04 03/11/2017[LB1.1]            Revision History        User Key Date/Time User Provider Type Action    > LB1.2 10/4/2017 11:46 AM Giselle Flores NP Nurse Practitioner Addend     LB1.1 10/4/2017  9:50 AM Giselle Flores NP Nurse Practitioner Sign            Progress Notes by Wesley Lama MD at 10/4/2017  8:38 AM     Author:  Wesley Lama MD Service:  Hospitalist Author Type:  Physician    Filed:  10/4/2017  9:37 AM Date of Service:  10/4/2017  8:38 AM Creation Time:  10/4/2017  8:38 AM    Status:  Signed :  Wesley Lama MD (Physician)         [facility]    Hospitalist Progress Note    Assessment & Plan   Mr. Erazo is an 89-year-old male with history of coronary artery disease, atrial fibrillation on chronic Coumadin, cerebrovascular accident, hyperlipidemia who present[NB1.1]ed[NB1.2] to the emergency room with suspected fall and[NB1.1] wa[NB1.2]s found to have intracranial hemorrhage.     Suspected[NB1.1] mechanical[NB1.2] fall resulting in traumatic intracranial hemorrhage.[NB1.1]    -[NB1.2]There were no witnesses however, the patient does have significant periorbital bruising.[NB1.1]   -[NB1.2] It is suspected that the patient sustained a fall and had a traumatic intraventricular hemorrhage.[NB1.1]  Likely mechanical as per the daughter he has been having balance issues lately.  -[NB1.2]The patient has no recollection of this event.[NB1.1]    -[NB1.2]Neurosurgery[NB1.1]-following; nonsurgical bleed  -Repeat head CT this morning is stable  -physical therapy and occupationalt therapy consult, transfer to floor today[NB1.2]     History of atrial fibrillation on chronic anticoagulation.[NB1.1]    -INR on  presentation 2.6; reversed with vitamin K and Kcentra  -Continue to hold Coumadin, CHADVASC-4; eventually resume Coumadin when okay with neurosurgery  -Rate controlled, resume PTA metoprolol XL 25 mg daily[NB1.2]    Coronary artery disease history.[NB1.1]    -Continue prior to admission simvastatin and metoprolol     M[NB1.2]ultiple bruises.[NB1.1]    -[NB1.2]He does have bruises on bilateral elbows and a bruise on his back.[NB1.1]    -PT/OT eval    Mild dementia:  -Reportedly patient does have mild dementia, patient was mildly confused overnight per nursing report  -Overall appears to be at baseline, continue to monitor[NB1.2]    D/W: RN  DVT Prophylaxis:[NB1.1] Pneumatic Compression Devices[NB1.2]  Code Status: DNR/DNI    Disposition: Expected discharge in[NB1.1] 1-2 days; likely will need TCU[NB1.2]    Wesley Lama MD    Interval History[NB1.1]   Mildly confused overnight.  Denies any headache or vision changes.  No focal weakness.  No pain[NB1.2]    -Data reviewed today: I reviewed all new labs and imaging results over the last 24 hours. I personally reviewed[NB1.1] no images or EKG's today[NB1.2].    Physical Exam   Temp: 98.3  F (36.8  C) Temp src: Axillary BP: 154/89 Pulse: 94 Heart Rate: 71 Resp: 18 SpO2: 95 % O2 Device: None (Room air)    Vitals:    10/03/17 0900 10/04/17 0600   Weight: 65.8 kg (145 lb) 59.4 kg (130 lb 15.3 oz)     Vital Signs with Ranges  Temp:  [98  F (36.7  C)-98.5  F (36.9  C)] 98.3  F (36.8  C)  Pulse:  [94] 94  Heart Rate:  [60-88] 71  Resp:  [6-22] 18  BP: (106-171)/(57-89) 154/89  SpO2:  [93 %-98 %] 95 %  I/O last 3 completed shifts:  In: 1108.33 [P.O.:600; I.V.:508.33]  Out: 1000 [Urine:1000]    Constitutional: AAOX3, NAD, Appears comfortable  HEENT:[NB1.1] Right periorbital bruise-stable[NB1.2]  Neck- Supple, Good ROM, No JVD  Respiratory:  No crackles, No wheezes, CTA B/L, Normal WOB  Cardiovascular: RRR, No murmur  GI: Soft, Non- tender, BS- normoactive, No  Guarding/rebound/rigidity  Skin/Integument: Warm and dry, no rashes  MSK: No joint deformity or swelling, no edema  Neuro: CN- grossly intact, Motor strength 5/5 on all 4 extremities.    Medications        sodium chloride (PF)  3 mL Intracatheter Q8H     atorvastatin (LIPITOR) tablet 20 mg  20 mg Oral Daily     influenza Vac Split High-Dose  0.5 mL Intramuscular Prior to discharge       Data     Recent Labs  Lab 10/04/17  0430 10/03/17  1146 10/03/17  0900   WBC 6.7  --  6.4   HGB 13.3  --  15.1   MCV 93  --  93     --  201   INR 1.04 1.15* 2.60*     --  139   POTASSIUM 4.2  --  4.3   CHLORIDE 106  --  105   CO2 28  --  27   BUN 20  --  18   CR 1.04  --  1.07   ANIONGAP 6  --  7   MING 8.2*  --  8.7   *  --  99   ALBUMIN  --   --  3.9   PROTTOTAL  --   --  7.7   BILITOTAL  --   --  0.8   ALKPHOS  --   --  103   ALT  --   --  39   AST  --   --  51*   TROPI  --   --  <0.015       Recent Results (from the past 24 hour(s))   CT Maxillofacial w/o Contrast    Narrative    CT MAXILLOFACIAL WITHOUT CONTRAST  10/3/2017 9:20 AM     HISTORY: Fall, facial trauma.    TECHNIQUE: Axial images were obtained through the facial bones without  contrast. Coronal and sagittal reconstructions were also acquired.  Radiation dose for this scan was reduced using automated exposure  control, adjustment of the mA and/or kV according to patient size, or  iterative reconstruction technique.    FINDINGS: The facial bones are intact. Specifically, the bony  mandible, pterygoid plates, zygomatic arches, nasal bones, and bony  orbits are intact. There is some soft tissue swelling over the right  supraorbital region. Both globes are intact. Retro-orbital structures  are normal.      Impression    IMPRESSION: Right supraorbital soft tissue swelling. No evidence for  any facial fractures.    MADDIE FAIR MD   CT Head w/o Contrast   Result Value    Radiologist flags Intracranial hemorrhage (AA)    Narrative    CT SCAN OF THE HEAD  WITHOUT CONTRAST October 3, 2017 9:21 AM     HISTORY: Fall, on coumadin.    TECHNIQUE: Axial images of the head and coronal reformations without  IV contrast material. Radiation dose for this scan was reduced using  automated exposure control, adjustment of the mA and/or kV according  to patient size, or iterative reconstruction technique.    COMPARISON: 3/11/2017.    FINDINGS: There is a small amount of acute intraventricular hemorrhage  layering in the posterior horn of the left lateral ventricle. There is  a right supraorbital scalp hematoma. There is no evidence for any  underlying fracture. Vascular calcifications are noted. Cerebral  atrophy and scattered white matter changes are noted. There is no  evidence for parenchymal hemorrhage, acute infarct, or mass effect.      Impression    IMPRESSION:  1. Small amount of acute intraventricular hemorrhage layering in the  posterior horn of the left lateral ventricle.  2. Cerebral atrophy with chronic white matter changes.  3. Right supraorbital scalp hematoma. No evidence for fracture.    [Critical Result: Intracranial hemorrhage]    Finding was identified on 10/3/2017 9:21 AM.     Dr. Leon was contacted by me on 10/3/2017 9:24 AM and verbalized  understanding of the critical result.      MADDIE FAIR MD   CT Head w/o Contrast    Narrative    CT OF THE HEAD WITHOUT CONTRAST 10/4/2017 5:09 AM     COMPARISON: Head CT 10/3/2017.    HISTORY: Intraventricular hemorrhage.    TECHNIQUE: 5 mm thick axial CT images of the head were acquired  without IV contrast material.    FINDINGS: Tiny amount of hyperdense hemorrhage layering in the  occipital horn of the left lateral ventricle is again noted without  change. There is no evidence for new or increasing intracranial  hemorrhage.     There is moderate diffuse cerebral volume loss. There are subtle  patchy areas of decreased density in the cerebral white matter  bilaterally that are consistent with sequela of chronic  small vessel  ischemic disease. The ventricles and basal cisterns are within normal  limits in configuration given the degree of cerebral volume loss.   There is no midline shift. There are no extra-axial fluid collections.      No intracranial mass or recent infarct.    The visualized paranasal sinuses are well aerated. There is no  mastoiditis. There are no fractures of the visualized bones.       Impression    IMPRESSION:   1. Stable tiny hyperdense hemorrhage in the occipital horn of the left  lateral ventricle. No evidence for new or increasing intracranial  hemorrhage.  2. Diffuse cerebral volume loss and cerebral white matter changes  consistent with chronic small vessel ischemic disease.      Radiation dose for this scan was reduced using automated exposure  control, adjustment of the mA and/or kV according to patient size, or  iterative reconstruction technique.[NB1.1]        Revision History        User Key Date/Time User Provider Type Action    > NB1.2 10/4/2017  9:37 AM Wesley Lama MD Physician Sign     NB1.1 10/4/2017  8:38 AM Wesley Lama MD Physician                   Procedure Notes     No notes of this type exist for this encounter.         Progress Notes - Therapies (Notes from 10/04/17 through 10/07/17)      Progress Notes by Ning Gaspar PT at 10/5/2017  9:01 AM     Author:  Ning Gaspar PT Service:  Physical Medicine and Rehabilitation Author Type:  Physical Therapist    Filed:  10/5/2017  9:01 AM Date of Service:  10/5/2017  9:01 AM Creation Time:  10/5/2017  9:01 AM    Status:  Signed :  Ning Gaspar PT (Physical Therapist)            10/05/17 0800   Signing Clinician's Name / Credentials   Signing clinician's name / credentials Ning Gaspar PT   Henley Balance Scale (NEFTALI HERNANDEZ, UZMA CASTELAN, JAMES SUTTON, RAFIQ, B: MEASURING BALANCE IN THE ELDERLY: VALIDATION OF AN INSTRUMENT. CAN. J. PUB. HEALTH, JULY/AUGUST SUPPLEMENT 2:S7-11, 1992.)   Sit To Stand 4   Standing  "Unsupported 4   Sitting Unsupported 4   Stand to Sit 4   Transfers 3   Standing with Eyes Closed 4   Standing Unsupported, Feet Together 2   Reach Forward With Outstretched Arm 3   Retrieve Object From Floor 3   Turning to Look Behind 2   Turn 360 Degrees 3   Placing Alternate Foot on Stool (4-6 inches) 1   Unsupported Tandem Stand (Demonstrate to Subject) 0   One Leg Stand 0   Total Score (A score of 45 or less has been correlated with an increased risk of falls)   Total Score (out of 56) 37[SF1.1]        Revision History        User Key Date/Time User Provider Type Action    > SF1.1 10/5/2017  9:01 AM Ning Gaspar, PT Physical Therapist Sign            Progress Notes by Indy Del Castillo PT at 10/4/2017  5:25 PM     Author:  Indy Del Castillo, PT Service:  (none) Author Type:  Physical Therapist    Filed:  10/4/2017  5:25 PM Date of Service:  10/4/2017  5:25 PM Creation Time:  10/4/2017  5:25 PM    Status:  Signed :  Indy Del Castillo, PT (Physical Therapist)          10/04/17 1522   Quick Adds   Type of Visit Initial PT Evaluation   Living Environment   Lives With alone  (\"My Wife is in a nursing home.\" Cats \"Oagie and Tubey\")   Living Arrangements house   Home Accessibility stairs to enter home;stairs within home;tub/shower is not walk in   Number of Stairs to Enter Home 4   Number of Stairs Within Home 15   Stair Railings at Home other (see comments)   Living Environment Comment [place] style home   Self-Care   Dominant Hand right   Usual Activity Tolerance good   Current Activity Tolerance fair   Activity/Exercise/Self-Care Comment Pt reports [place] assists with laundry now that hi wife is in the NH.   Functional Level Prior   Ambulation 1-->assistive equipment   Transferring 0-->independent   Toileting 0-->independent   Bathing 0-->independent   Dressing 0-->independent   Eating 0-->independent   Communication 0-->understands/communicates without difficulty   Swallowing 0-->swallows " foods/liquids without difficulty   Cognition 0 - no cognition issues reported   Fall history within last six months yes   Number of times patient has fallen within last six months 1   Which of the above functional risks had a recent onset or change? ambulation   Prior Functional Level Comment Per chart pt has a caregiver during the day; wife was presnet at admit; however, pt states she lives in a NH. Prior notes indicate pt's wfie is wc bound. Pt's daughter involved in care, not present at [facility] eof eval.   General Information   Onset of Illness/Injury or Date of Surgery - Date 10/03/17   Referring Physician Wesley Lama MD   Patient/Family Goals Statement None stated.    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 Per H&P pt with recent balance problems per Daughter.  Pt on coumadin at time of the fall.   Precautions/Limitations fall precautions   General Observations R eye black and blue   Cognitive Status Examination   Orientation person;place;time  (not situation, place waxes and wanes)   Level of Consciousness alert   Follows Commands and Answers Questions 100% of the time;able to follow single-step instructions   Personal Safety and Judgment impulsive   Cognitive Comment Per chart dementia   Integumentary/Edema   Integumentary/Edema Comments Black R eye    Posture    Posture Forward head position;Protracted shoulders   Range of Motion (ROM)   ROM Comment B LEs WFL as obs via functional mobiltiy   Strength   Strength Comments Demonstrates antigravity strength, MMTs not assessedas pt eager to get up and move.    Bed Mobility   Bed Mobility Comments SBA sup>sit   Transfer Skills   Transfer Comments CGA sit<>stand   Gait   Gait Comments Demonstrates shuffling gait, minimal clearance with feet, flexted trunk with  "bed<>bathroom gait, with WW. CGA, Min A to navigate walke rin bathroom.   Balance   Balance Comments Able to lean down t othe floor and tie shoes without LOB   Sensory Examination   Sensory Perception Comments Deneis n/t   General Therapy Interventions   Planned Therapy Interventions balance training;bed mobility training;gait training;neuromuscular re-education;ROM;strengthening;stretching;transfer training;progressive activity/exercise;home program guidelines   Clinical Impression   Criteria for Skilled Therapeutic Intervention yes, treatment indicated   PT Diagnosis Impaired Gait   Influenced by the following impairments Generalized weakness, decreaed activity toelrance, decreased balance in standing with dynamic activity   Functional limitations due to impairments Decreaesd functional independence   Clinical Presentation Stable/Uncomplicated   Clinical Presentation Rationale seeabove   Clinical Decision Making (Complexity) Low complexity   Therapy Frequency` daily   Predicted Duration of Therapy Intervention (days/wks) 4 days   Anticipated Discharge Disposition Transitional Care Facility   Risk & Benefits of therapy have been explained Yes   Patient, Family & other staff in agreement with plan of care Yes   [facility] [employer] TM \"6 Clicks\"   2016, Trustees of [facility], under license to [employer].  All rights reserved.   6 Clicks Short Forms Basic Mobility Inpatient Short Form   [facility] [employer] [facility]  \"6 Clicks\" V.2 Basic Mobility Inpatient Short Form   1. Turning from your back to your side while in a flat bed without using bedrails? 3 - A Little   2. Moving from lying on your back to sitting on the side of a flat bed without using bedrails? 3 - A Little   3. Moving to and from a bed to a chair (including a wheelchair)? 3 - A Little   4. Standing up from a chair using your arms (e.g., wheelchair, or bedside chair)? 3 - A Little   5. To walk in hospital room? 3 - A Little   6. Climbing 3-5 " steps with a railing? 3 - A Little   Basic Mobility Raw Score (Score out of 24.Lower scores equate to lower levels of function) 18   Total Evaluation Time   Total Evaluation Time (Minutes) 10[SS1.1]        Revision History        User Key Date/Time User Provider Type Action    > SS1.1 10/4/2017  5:25 PM Indy Del Castillo, PT Physical Therapist Sign            Progress Notes by Danni Mistry OT at 10/4/2017 12:45 PM     Author:  Danni Mistry OT Service:  (none) Author Type:  Occupational Therapist    Filed:  10/4/2017  4:45 PM Date of Service:  10/4/2017 12:45 PM Creation Time:  10/4/2017  4:45 PM    Status:  Signed :  Danni Mistry OT (Occupational Therapist)            10/04/17 1056   Quick Adds   Type of Visit Initial Occupational Therapy Evaluation   Living Environment   Lives With alone   Living Arrangements house   Number of Stairs to Enter Home 4   Number of Stairs Within Home 15   Self-Care   Dominant Hand right   General Information   Onset of Illness/Injury or Date of Surgery - Date 10/03/17   Referring Physician Paola   Patient/Family Goals Statement Go home.   Additional Occupational Profile Info/Pertinent History of Current Problem Admitted after fall that he doesn't remember. Dx traumatic ICH.   Precautions/Limitations fall precautions   Cognitive Status Examination   Orientation person;place   Level of Consciousness alert   Able to Follow Commands WNL/WFL   Personal Safety (Cognitive) at risk behaviors demonstrated   Memory impaired   Cognitive Comment Unoriented to month, day, date. No recall of information after short delay.    Pain Assessment   Patient Currently in Pain No   Range of Motion (ROM)   ROM Quick Adds No deficits were identified   Strength   Manual Muscle Testing Quick Adds No deficits were identified   Mobility   Bed Mobility Comments SB A   Transfer Skill: Bed to Chair/Chair to Bed   Level of [place]: Bed to Chair minimum assist (75% patients  "effort)   Transfer Skill: Sit to Stand   Level of [place]: Sit/Stand minimum assist (75% patients effort)   Balance   Balance Comments Unsteady on his feet. Reaches out for furniture to steady self.    Lower Body Dressing   Level of [place]: Dress Lower Body stand-by assist   Grooming   Level of [place]: Grooming contact guard   Eating/Self Feeding   Level of [place]: Eating independent   Activities of Daily Living Analysis   Impairments Contributing to Impaired Activities of Daily Living balance impaired;cognition impaired   General Therapy Interventions   Planned Therapy Interventions ADL retraining;cognition;transfer training   Clinical Impression   Criteria for Skilled Therapeutic Interventions Met yes, treatment indicated   OT Diagnosis Decreased ADL   Influenced by the following impairments balance, cognition   Assessment of Occupational Performance 1-3 Performance Deficits   Identified Performance Deficits dressing, transfers, IADL   Clinical Decision Making (Complexity) Low complexity   Therapy Frequency daily   Predicted Duration of Therapy Intervention (days/wks) 3 days   Anticipated Discharge Disposition Transitional Care Facility  (see POC note)   Risks and Benefits of Treatment have been explained. Yes   Patient, Family & other staff in agreement with plan of care Yes   [facility] [employer] TM \"6 Clicks\"   2016, Trustees of [facility], under license to [employer].  All rights reserved.   6 Clicks Short Forms Daily Activity Inpatient Short Form   [facility]  \"6 Clicks\" Daily Activity Inpatient Short Form   1. Putting on and taking off regular lower body clothing? 3 - A Little   2. Bathing (including washing, rinsing, drying)? 3 - A Little   3. Toileting, which includes using toilet, bedpan or urinal? 3 - A Little   4. Putting on and taking off regular upper body clothing? 3 - A Little   5. Taking care of personal grooming such as brushing teeth? 3 - A Little "   6. Eating meals? 4 - None   Daily Activity Raw Score (Score out of 24.Lower scores equate to lower levels of function) 19   Total Evaluation Time   Total Evaluation Time (Minutes) 10[JM1.1]        Revision History        User Key Date/Time User Provider Type Action    > JM1.1 10/4/2017  4:45 PM Danni Mistry, OT Occupational Therapist Sign

## 2017-10-03 NOTE — IP AVS SNAPSHOT
"` `           [facility] [address] [facility]: [phone]                                              INTERAGENCY TRANSFER FORM - NURSING   10/3/2017                    Hospital Admission Date: 10/3/2017  KIMBERLY SHARIF   : 1928  Sex: Male        Attending Provider: Wesley Lama MD     Allergies:  No Known Allergies    Infection:  None   Service:  HOSPITALIST    Ht:  1.778 m (5' 10\")   Wt:  59.4 kg (130 lb 15.3 oz)   Admission Wt:  65.8 kg (145 lb)    BMI:  18.79 kg/m 2   BSA:  1.71 m 2            Patient PCP Information     Provider PCP Type    Physician No Ref-Primary General      Current Code Status     Date Active Code Status Order ID Comments User Context       Prior      Code Status History     Date Active Date Inactive Code Status Order ID Comments User Context    10/7/2017 12:35 PM  DNR/DNI [id number]  Wesley Lama MD Outpatient    10/3/2017 10:45 AM 10/7/2017 12:35 PM DNR/DNI [id number]  Wesley Lama MD Inpatient    3/14/2017  1:35 PM 10/3/2017 10:45 AM DNR/DNI [id number]  Maico Rea MD Outpatient    3/11/2017  6:05 PM 3/14/2017  1:35 PM DNR/DNI [id number]  Gillian Hansen MD Inpatient    2013  9:40 PM 2013  2:10 PM Full Code [id number]  Katia Lopez PA-C Inpatient      Advance Directives        Does patient have a scanned Advance Directive/ACP document in EPIC?           No        Hospital Problems as of 10/7/2017              Priority Class Noted POA    ICH (intracerebral hemorrhage) (H) Medium  10/3/2017 Yes      Non-Hospital Problems as of 10/7/2017              Priority Class Noted    Cerebral infarction (H) Medium  2013    Advanced directives, counseling/discussion Medium  2013    CAD (coronary artery disease) Medium  Unknown    Hyperlipidemia with target LDL less than 100 Medium  Unknown    Influenza A Medium  3/11/2017      Immunizations     Name Date      Influenza (High Dose) 3 valent vaccine 10/05/17     Influenza " (IIV3) 11/01/12     Pneumococcal 23 valent 06/24/93          END      ASSESSMENT     Discharge Profile Flowsheet     EXPECTED DISCHARGE     Senior Linkage Line Referral Placed  10/07/17 10/07/17 1242    Expected Discharge Date  10/07/17 (DC TCU when bed and no IV meds 24h) 10/06/17 1405   Referrals Placed  Emergent Admission to SNF/TCU 10/07/17 1242    DISCHARGE NEEDS ASSESSMENT     SKIN      Concerns To Be Addressed  all concerns addressed in this encounter;denies needs/concerns at this time;cognitive/perceptual concerns;decision making concerns;discharge planning concerns;home safety concerns 03/14/17 1424   Inspection of bony prominences  Full 10/07/17 1010    Medical Team notified of plan?  yes 10/05/17 1216   Skin WDL  ex 10/07/17 1010    # of Referrals Placed by CTS  Scheduled Follow-up appointments (PCP and Cardiology) 10/24/16 1417   Skin Temperature  warm 10/07/17 1010    Equipment Used at Home  grab bar;raised toilet 05/18/13 0921   Skin Moisture  dry 10/07/17 1010    GASTROINTESTINAL (ADULT,PEDIATRIC,OB)     Skin Elasticity  slow return to original state 10/05/17 2217    GI WDL  WDL 10/07/17 1010   Skin Integrity  bruise(s) 10/07/17 1010    Last Bowel Movement  10/04/17 10/05/17 2217   Full except areas not inspected   Hip, left;Hip, right;Buttock, left;Buttock, right;Sacrum;Coccyx 10/05/17 0034    Passing flatus  yes 10/06/17 0925   SAFETY      COMMUNICATION ASSESSMENT     Safety WDL  WDL 10/07/17 1010    Patient's communication style  spoken language (English or Bilingual) 10/03/17 0848   Safety Equipment  oxygen flowmeter;suction regulator;suction equipment 10/04/17 1207    FINAL RESOURCES     All Alarms  alarm(s) activated and audible 10/07/17 1010    [facility] Number  [id number] 10/07/17 1242                      Assessment WDL (Within Defined Limits) Definitions           Safety WDL     Effective: 09/28/15    Row Information: <b>WDL Definition:</b> Bed in low position, wheels locked; call light in reach;  "upper side rails up x 2; ID band on<br> <font color=\"gray\"><i>Item=AS safety wdl>>List=AS safety wdl>>Version=F14</i></font>      Skin WDL     Effective: 09/28/15    Row Information: <b>WDL Definition:</b> Warm; dry; intact; elastic; without discoloration; pressure points without redness<br> <font color=\"gray\"><i>Item=AS skin wdl>>List=AS skin wdl>>Version=F14</i></font>      Vitals     Vital Signs Flowsheet     VITAL SIGNS     Side Effects Monitoring: Respiratory Quality  R 10/07/17 1000    Temp  98  F (36.7  C) 10/07/17 1156   Side Effects Monitoring: Respiratory Depth  N 10/07/17 1000    Temp src  Oral 10/07/17 1156   Side Effects Monitoring: Sedation Level  1 10/07/17 1000    Resp  16 10/07/17 1156   HEIGHT AND WEIGHT      Pulse  64 10/06/17 1225   Height  1.778 m (5' 10\") 10/03/17 1000    Heart Rate  90 10/07/17 1156   Weight  59.4 kg (130 lb 15.3 oz) 10/04/17 0646    Pulse/Heart Rate Source  Monitor 10/07/17 1156   BSA (Calculated - sq m)  1.8 10/03/17 1000    BP  120/70 10/07/17 1156   BMI (Calculated)  20.85 10/03/17 1000    BP Location  Right arm 10/07/17 1156   EKG MONITORING      OXYGEN THERAPY     Cardiac Regularity  Regular 10/03/17 0903    SpO2  95 % 10/07/17 1156   Cardiac Rhythm  NSR 10/03/17 0903    O2 Device  None (Room air) 10/07/17 1156   POSITIONING      PAIN/COMFORT     Body Position  independently positioning 10/07/17 1010    Patient Currently in Pain  denies 10/07/17 0816   Head of Bed (HOB)  HOB at 20 degrees 10/06/17 0720    Preferred Pain Scale  number (Numeric Rating Pain Scale) 10/05/17 2207   Chair  Upright in chair 10/07/17 1010    Patient's Stated Pain Goal  No pain 10/05/17 2207   Positioning/Transfer Devices  pillows;in use 10/06/17 0720    0-10 Pain Scale  1 10/05/17 2206   DAILY CARE      Pain Location  Neck 10/05/17 2207   Activity Management  activity adjusted per tolerance;ambulated to bathroom 10/07/17 1010    Pain Descriptors  Sharp 10/05/17 2207   Activity Assistance " Provided  assistance, stand-by 10/07/17 1010    Pain Management Interventions  analgesia administered 10/05/17 2207   Assistive Device Utilized  gait belt;walker 10/07/17 1010    Pain Intervention(s)  Medication (See eMAR) 10/05/17 2207   ECG      Response to Interventions  Absence of nonverbal indicators of pain 10/05/17 2317   ECG Rhythm  Normal sinus rhythm 10/04/17 1308    ANALGESIA SIDE EFFECTS MONITORING                   Patient Lines/Drains/Airways Status    Active LINES/DRAINS/AIRWAYS     Name: Placement date: Placement time: Site: Days: Last dressing change:    Peripheral IV 10/03/17 Right Upper arm 10/03/17   0904   Upper arm   4     Pressure Injury 10/03/17 Coccyx Stage 1 10/03/17   1200    4     Wound 03/11/17 Left Toe (Comment  which one) Abrasion(s) Abrasion on tip of 2nd toe, redness down entire toe 03/11/17   1943   Toe (Comment  which one)   209     Wound 10/03/17 Elbow Abrasion(s) 10/03/17   1133   Elbow   4             Patient Lines/Drains/Airways Status    Active PICC/CVC     None            Intake/Output Detail Report     Date Intake     Output Net    Shift P.O. I.V. IV Piggyback Total Urine Total       Noc 10/05/17 2300 - 10/06/17 0659 240 -- -- 240 -- -- 240    Day 10/06/17 0700 - 10/06/17 1459 260 -- -- 260 350 350 -90    Pamela 10/06/17 1500 - 10/06/17 2259 -- -- -- -- -- -- 0    Noc 10/06/17 2300 - 10/07/17 0659 200 -- -- 200 -- -- 200    Day 10/07/17 0700 - 10/07/17 1459 -- -- -- -- -- -- 0      Last Void/BM       Most Recent Value    Urine Occurrence 1 at 10/07/2017 1116    Stool Occurrence 1 at 10/04/2017 1600      Case Management/Discharge Planning     Case Management/Discharge Planning Flowsheet     REFERRAL INFORMATION     DISCHARGE PLANNING      Referral Source  case finding;high risk screening;interdisciplinary rounds 10/05/17 1216   Medical Team notified of plan?  yes 10/05/17 1216    # of Referrals Placed by CTS  Scheduled Follow-up appointments (PCP and Cardiology) 10/24/16 1852    "Equipment Used at Home  grab bar;raised toilet 05/18/13 0921    Reason For Consult  discharge planning 10/07/17 1242   FINAL RESOURCES      Record Reviewed  medical record 10/07/17 1242   PAS Number  [id number] 10/07/17 1242    CTS Assigned to Case  Marysol Herring 10/07/17 1242   Senior Linkage Line Referral Placed  10/07/17 10/07/17 1242    LIVING ENVIRONMENT     Referrals Placed  Emergent Admission to SNF/TCU 10/07/17 1242    Lives With  alone (\"My Wife is in a nursing home.\" Cats \"Oagie and Tubey\") 10/04/17 1524   ABUSE RISK SCREEN      Living Arrangements  house 10/04/17 1524   QUESTION TO PATIENT:  Has a member of your family or a partner(now or in the past) intimidated, hurt, manipulated, or controlled you in any way?  no 10/03/17 0903    COPING/STRESS     QUESTION TO PATIENT: Do you feel safe going back to the place where you are living?  yes 10/03/17 0903    Major Change/Loss/Stressor  family/significant other illness (wife to nursing home) 10/03/17 1137   OBSERVATION: Is there reason to believe there has been maltreatment of a vulnerable adult (ie. Physical/Sexual/Emotional abuse, self neglect, lack of adequate food, shelter, medical care, or financial exploitation)?  no 10/03/17 0903    EXPECTED DISCHARGE     (R) MENTAL HEALTH SUICIDE RISK      Expected Discharge Date  10/07/17 (DC TCU when bed and no IV meds 24h) 10/06/17 1405   Are you depressed or being treated for depression?  No 10/03/17 1136    ASSESSMENT/CONCERNS TO BE ADDRESSED     HOMICIDE RISK      Concerns To Be Addressed  all concerns addressed in this encounter;denies needs/concerns at this time;cognitive/perceptual concerns;decision making concerns;discharge planning concerns;home safety concerns 03/14/17 1424   Feels Like Hurting Others  no 10/03/17 0903            "

## 2017-10-03 NOTE — IP AVS SNAPSHOT
"          [facility]: [phone]                                              INTERAGENCY TRANSFER FORM - LAB / IMAGING / EKG / EMG RESULTS   10/3/2017                    Hospital Admission Date: 10/3/2017  KIMBERLY SHARIF   : 1928  Sex: Male        Attending Provider: Wesley Lama MD     Allergies:  No Known Allergies    Infection:  None   Service:  HOSPITALIST    Ht:  1.778 m (5' 10\")   Wt:  59.4 kg (130 lb 15.3 oz)   Admission Wt:  65.8 kg (145 lb)    BMI:  18.79 kg/m 2   BSA:  1.71 m 2            Patient PCP Information     Provider PCP Type    Physician No Ref-Primary General         Lab Results - 3 Days      Glucose by meter [[id number]] (Abnormal)  Resulted: 10/07/17 0236, Result status: Final result    Ordering provider: Wesley Lama MD  10/07/17 0223 Resulting lab: POINT OF CARE TEST, GLUCOSE    Specimen Information    Type Source Collected On     10/07/17 0223          Components       Value Reference Range Flag Lab   Glucose 107 70 - 99 mg/dL H 170            Potassium [[id number]]  Resulted: 10/05/17 0846, Result status: Final result    Ordering provider: Amilcar Zavala MD  10/05/17 0027 Resulting lab: [facility]    Specimen Information    Type Source Collected On   Blood  10/05/17 0753          Components       Value Reference Range Flag Lab   Potassium 4.3 3.4 - 5.3 mmol/L  FrStHsLb            Magnesium [[id number]]  Resulted: 10/05/17 0846, Result status: Final result    Ordering provider: Amilcar Zavala MD  10/05/17 0027 Resulting lab: [facility]    Specimen Information    Type Source Collected On   Blood  10/05/17 0753          Components       Value Reference Range Flag Lab   Magnesium 2.1 1.6 - 2.3 mg/dL  FrStHsLb            INR [[id number]]  Resulted: 10/04/17 045, Result status: Final result    Ordering provider: Wesley Lama MD  10/04/17 0000 Resulting lab: [facility]    Specimen " Information    Type Source Collected On   Blood  10/04/17 0430          Components       Value Reference Range Flag Lab   INR 1.04 0.86 - 1.14  FrStHsLb            Basic metabolic panel [[id number]] (Abnormal)  Resulted: 10/04/17 0450, Result status: Final result    Ordering provider: Wesley Lama MD  10/04/17 0000 Resulting lab: [facility]    Specimen Information    Type Source Collected On   Blood  10/04/17 0430          Components       Value Reference Range Flag Lab   Sodium 140 133 - 144 mmol/L  FrStHsLb   Potassium 4.2 3.4 - 5.3 mmol/L  FrStHsLb   Chloride 106 94 - 109 mmol/L  FrStHsLb   Carbon Dioxide 28 20 - 32 mmol/L  FrStHsLb   Anion Gap 6 3 - 14 mmol/L  FrStHsLb   Glucose 107 70 - 99 mg/dL H FrStHsLb   Urea Nitrogen 20 7 - 30 mg/dL  FrStHsLb   Creatinine 1.04 0.66 - 1.25 mg/dL  FrStHsLb   GFR Estimate 67 >60 mL/min/1.7m2  FrStHsLb   Comment:  Non  GFR Calc   GFR Estimate If Black 81 >60 mL/min/1.7m2  FrStHsLb   Comment:  African American GFR Calc   Calcium 8.2 8.5 - 10.1 mg/dL L FrStHsLb            CBC with platelets [[id number]] (Abnormal)  Resulted: 10/04/17 0436, Result status: Final result    Ordering provider: Wesley Lama MD  10/04/17 0000 Resulting lab: [facility]    Specimen Information    Type Source Collected On   Blood  10/04/17 0430          Components       Value Reference Range Flag Lab   WBC 6.7 4.0 - 11.0 10e9/L  FrStHsLb   RBC Count 4.03 4.4 - 5.9 10e12/L L FrStHsLb   Hemoglobin 13.3 13.3 - 17.7 g/dL  FrStHsLb   Hematocrit 37.6 40.0 - 53.0 % L FrStHsLb   MCV 93 78 - 100 fl  FrStHsLb   MCH 33.0 26.5 - 33.0 pg  FrStHsLb   MCHC 35.4 31.5 - 36.5 g/dL  FrStHsLb   RDW 12.9 10.0 - 15.0 %  FrStHsLb   Platelet Count 176 150 - 450 10e9/L  FrStHsLb            Glucose by meter [[id number]] (Abnormal)  Resulted: 10/04/17 0301, Result status: Final result    Ordering provider: Wesley Lama MD  10/04/17 0258 Resulting lab: POINT OF CARE  "TEST, GLUCOSE    Specimen Information    Type Source Collected On     10/04/17 0258          Components       Value Reference Range Flag Lab   Glucose 105 70 - 99 mg/dL H 170            Testing Performed By     Lab - Abbreviation Name Director Address Valid Date Range    14 - FrStHsLb [facility] Unknown [address] [place] [address] 05/08/15 1057 - Present    170 - Unknown POINT OF CARE TEST, GLUCOSE Unknown Unknown 10/31/11 1114 - Present            Unresulted Labs (24h ago through future)    Start       Ordered    Unscheduled  Potassium  (Potassium Replacement - \"Standard\" - For K levels less than 3.4 mmol/L - UU,UR,UA,RH,SH,PH,WY )  CONDITIONAL (SPECIFY),   Routine     Comments:  Obtain Potassium Level for these conditions:  *IF no potassium result within 24 hours before initiation of order set, draw potassium level with next lab collect.    *2 HOURS AFTER last IV potassium replacement dose and 4 hours after an oral replacement dose.  *Next morning after potassium dose.     Repeat Potassium Replacement if necessary.    10/03/17 1045         Imaging Results - 3 Days      CT Head w/o Contrast [[id number]]  Resulted: 10/04/17 1554, Result status: Final result    Ordering provider: Alisson Tang APRN CNP  10/03/17 0951 Resulted by: Remington Saldaña MD    Performed: 10/04/17 0509 - 10/04/17 0509 Resulting lab: RADIOLOGY RESULTS    Narrative:       CT OF THE HEAD WITHOUT CONTRAST 10/4/2017 5:09 AM     COMPARISON: Head CT 10/3/2017.    HISTORY: Intraventricular hemorrhage.    TECHNIQUE: 5 mm thick axial CT images of the head were acquired  without IV contrast material.    FINDINGS: Tiny amount of hyperdense hemorrhage layering in the  occipital horn of the left lateral ventricle is again noted without  change. There is no evidence for new or increasing intracranial  hemorrhage.     There is moderate diffuse cerebral volume loss. There are subtle  patchy areas of decreased density in the " cerebral white matter  bilaterally that are consistent with sequela of chronic small vessel  ischemic disease. The ventricles and basal cisterns are within normal  limits in configuration given the degree of cerebral volume loss.   There is no midline shift. There are no extra-axial fluid collections.      No intracranial mass or recent infarct.    The visualized paranasal sinuses are well aerated. There is no  mastoiditis. There are no fractures of the visualized bones.       Impression:       IMPRESSION:   1. Stable tiny hyperdense hemorrhage in the occipital horn of the left  lateral ventricle. No evidence for new or increasing intracranial  hemorrhage.  2. Diffuse cerebral volume loss and cerebral white matter changes  consistent with chronic small vessel ischemic disease.      Radiation dose for this scan was reduced using automated exposure  control, adjustment of the mA and/or kV according to patient size, or  iterative reconstruction technique.    ROSCOE AGUILAR MD      Testing Performed By     Lab - Abbreviation Name Director Address Valid Date Range    104 - Rad Rslts RADIOLOGY RESULTS Unknown Unknown 02/16/05 1553 - Present            Encounter-Level Documents:     There are no encounter-level documents.      Order-Level Documents:     There are no order-level documents.

## 2017-10-03 NOTE — PROGRESS NOTES
Contacted by ER.  Pt found by daughter.  He has baseline dementia.  Presented to Counts include [facility] beds at the [facility] ER.  Has a large right periorbital hematoma per ER doctor. Per Ct scan pt has a small IVH in the left lateral ventricle.       -reverse anticoagulants  -admit to hospital service  -repeat CT in AM.    Full consult to follow       Alisson Tang [facility]  [facility]  [address] [place] [address].  [id number]  Tel. [phone]  Fax [phone]

## 2017-10-04 ENCOUNTER — APPOINTMENT (OUTPATIENT)
Dept: CT IMAGING | Facility: CLINIC | Age: 82
DRG: 084 | End: 2017-10-04
Attending: NURSE PRACTITIONER
Payer: MEDICARE

## 2017-10-04 ENCOUNTER — APPOINTMENT (OUTPATIENT)
Dept: PHYSICAL THERAPY | Facility: CLINIC | Age: 82
DRG: 084 | End: 2017-10-04
Attending: INTERNAL MEDICINE
Payer: MEDICARE

## 2017-10-04 ENCOUNTER — APPOINTMENT (OUTPATIENT)
Dept: OCCUPATIONAL THERAPY | Facility: CLINIC | Age: 82
DRG: 084 | End: 2017-10-04
Attending: INTERNAL MEDICINE
Payer: MEDICARE

## 2017-10-04 LAB
ANION GAP SERPL CALCULATED.3IONS-SCNC: 6 MMOL/L (ref 3–14)
BUN SERPL-MCNC: 20 MG/DL (ref 7–30)
CALCIUM SERPL-MCNC: 8.2 MG/DL (ref 8.5–10.1)
CHLORIDE SERPL-SCNC: 106 MMOL/L (ref 94–109)
CO2 SERPL-SCNC: 28 MMOL/L (ref 20–32)
CREAT SERPL-MCNC: 1.04 MG/DL (ref 0.66–1.25)
ERYTHROCYTE [DISTWIDTH] IN BLOOD BY AUTOMATED COUNT: 12.9 % (ref 10–15)
GFR SERPL CREATININE-BSD FRML MDRD: 67 ML/MIN/1.7M2
GLUCOSE BLDC GLUCOMTR-MCNC: 105 MG/DL (ref 70–99)
GLUCOSE SERPL-MCNC: 107 MG/DL (ref 70–99)
HCT VFR BLD AUTO: 37.6 % (ref 40–53)
HGB BLD-MCNC: 13.3 G/DL (ref 13.3–17.7)
INR PPP: 1.04 (ref 0.86–1.14)
MCH RBC QN AUTO: 33 PG (ref 26.5–33)
MCHC RBC AUTO-ENTMCNC: 35.4 G/DL (ref 31.5–36.5)
MCV RBC AUTO: 93 FL (ref 78–100)
PLATELET # BLD AUTO: 176 10E9/L (ref 150–450)
POTASSIUM SERPL-SCNC: 4.2 MMOL/L (ref 3.4–5.3)
RBC # BLD AUTO: 4.03 10E12/L (ref 4.4–5.9)
SODIUM SERPL-SCNC: 140 MMOL/L (ref 133–144)
WBC # BLD AUTO: 6.7 10E9/L (ref 4–11)

## 2017-10-04 PROCEDURE — 97530 THERAPEUTIC ACTIVITIES: CPT | Mod: GP | Performed by: PHYSICAL THERAPIST

## 2017-10-04 PROCEDURE — A9270 NON-COVERED ITEM OR SERVICE: HCPCS | Mod: GY | Performed by: INTERNAL MEDICINE

## 2017-10-04 PROCEDURE — 00000146 ZZHCL STATISTIC GLUCOSE BY METER IP

## 2017-10-04 PROCEDURE — 85027 COMPLETE CBC AUTOMATED: CPT | Performed by: INTERNAL MEDICINE

## 2017-10-04 PROCEDURE — 99232 SBSQ HOSP IP/OBS MODERATE 35: CPT | Performed by: INTERNAL MEDICINE

## 2017-10-04 PROCEDURE — 97535 SELF CARE MNGMENT TRAINING: CPT | Mod: GO

## 2017-10-04 PROCEDURE — 97116 GAIT TRAINING THERAPY: CPT | Mod: GP | Performed by: PHYSICAL THERAPIST

## 2017-10-04 PROCEDURE — 70450 CT HEAD/BRAIN W/O DYE: CPT

## 2017-10-04 PROCEDURE — 36415 COLL VENOUS BLD VENIPUNCTURE: CPT | Performed by: INTERNAL MEDICINE

## 2017-10-04 PROCEDURE — 97161 PT EVAL LOW COMPLEX 20 MIN: CPT | Mod: GP | Performed by: PHYSICAL THERAPIST

## 2017-10-04 PROCEDURE — 97165 OT EVAL LOW COMPLEX 30 MIN: CPT | Mod: GO

## 2017-10-04 PROCEDURE — 40000915 ZZH STATISTIC SITTER, EVENING HOURS

## 2017-10-04 PROCEDURE — 25000132 ZZH RX MED GY IP 250 OP 250 PS 637: Mod: GY | Performed by: INTERNAL MEDICINE

## 2017-10-04 PROCEDURE — 40000193 ZZH STATISTIC PT WARD VISIT: Performed by: PHYSICAL THERAPIST

## 2017-10-04 PROCEDURE — 97530 THERAPEUTIC ACTIVITIES: CPT | Mod: GO

## 2017-10-04 PROCEDURE — 80048 BASIC METABOLIC PNL TOTAL CA: CPT | Performed by: INTERNAL MEDICINE

## 2017-10-04 PROCEDURE — 85610 PROTHROMBIN TIME: CPT | Performed by: INTERNAL MEDICINE

## 2017-10-04 PROCEDURE — 40000133 ZZH STATISTIC OT WARD VISIT

## 2017-10-04 PROCEDURE — 12000007 ZZH R&B INTERMEDIATE

## 2017-10-04 RX ORDER — METOPROLOL SUCCINATE 25 MG/1
25 TABLET, EXTENDED RELEASE ORAL DAILY
Status: DISCONTINUED | OUTPATIENT
Start: 2017-10-04 | End: 2017-10-06

## 2017-10-04 RX ADMIN — ATORVASTATIN CALCIUM 20 MG: 20 TABLET, FILM COATED ORAL at 08:44

## 2017-10-04 RX ADMIN — METOPROLOL SUCCINATE 25 MG: 25 TABLET, EXTENDED RELEASE ORAL at 19:35

## 2017-10-04 ASSESSMENT — VISUAL ACUITY
OU: NORMAL ACUITY;GLASSES

## 2017-10-04 NOTE — PLAN OF CARE
Problem: Patient Care Overview  Goal: Plan of Care/Patient Progress Review  Discharge Planner PT    Evaluation completed, treatment initiated. Pt adm post fall, on coumadin, found to have a IVH. Lives alone?-per pt, drives, reports independence with occasional use of a cane. States his wife resides in a NH. Per H& P it sounds like the pt may have a caregiver during the day. Pt reports stairs in the home at enter/exit as well as to the basement for laundry. PMH includes some dementia per chart. Pt is not a reliable historian.   Patient plan for discharge: None stated.  Current status: Pleasantly confused at times, repeats self and needs cues to stay on task. Sup>sit SBa, sit<>stand CGA, ambulated with WW and CGA at gait belt, cues for heel strike and gaze. Pt with shuffling gait, trunk flexion, elevated alker helped posture a bit. Negotiated 6 stairs 3x2 with B rails, needs safety cues to use rails and keep entire foot on the stair to avoid slip/fall.   Barriers to return to prior living situation: Stairs, lives alone, decreased safety, fall risk  Recommendations for discharge: TCU  Rationale for recommendations: Pt will benefit from ongoing therapy to address deficits.       Entered by: Indy Del Castillo 10/04/2017 5:32 PM

## 2017-10-04 NOTE — PLAN OF CARE
Problem: Patient Care Overview  Goal: Plan of Care/Patient Progress Review  OT: Eval complete and treatment initiated.     Discharge Planner OT   Patient plan for discharge: Home.   Current status: CG to MIN A to amb in room for ADL. Decreased balance and memory noted during session.   Barriers to return to prior living situation: Impaired cognition and balance. Lives alone.   Recommendations for discharge: TCU but patient refuses. Is open to home care. If patient goes home, at this time needs A with all mobility and ADL. Rec supervision and A with all IADL due to cognitive impairment. Rec patient have A with driving and rec home OT and RN.   Rationale for recommendations: Needs continued therapy.       Entered by: Danni Mistry 10/04/2017 11:10 AM

## 2017-10-04 NOTE — PROGRESS NOTES
[facility]    Neurosurgery Progress Note    Date of Service (when I saw the patient): 10/04/2017     Assessment & Plan   Rell Erazo is an 89 year old male who was admitted on 10/3/2017. 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  The patient does not remember falling, however, blood noted on the wall at home and patient with bruising and edema to the right eye.  He denies any pain.  He denies headache or dizziness. He is able to track conversation and answers most questions appropriately while sitting up in the chair. He finished breakfast and denies N/V.  Per staff he is baseline for being somewhat confused.  CT today is stable with no evidence of new or increasing intracranial hemorrhage.  The patient had been on coumadin since 2016 for afib which was held on admission.  Per Dr. Masters recommend stopping blood thinners due to patient's risk for recurring falls, however, if it is felt that patient must be restarted the recommendation is to wait at least two weeks before restarting.    Active Problems:    ICH (intracerebral hemorrhage) (H)    Assessment: IVH; stable    Plan: Continue to hold Coumadin and all blood thinners due to patient's risk of recurring falls; if must restart wait at least 2 weeks.  Patient's CT is stable.  Will [facility] with repeat CT of head in 4 weeks. NSG will sign off at this time, please contact us if any further questions.    I have discussed the following assessment and plan with Dr. Ignacio Masters who is in agreement with initial plan and will follow up with further consultation recommendations.    Giselle Flores [facility]  [facility]  [address] [address]    Tel [phone]  Pager [phone]      Interval History   Stable    Physical Exam   Temp: 98.3  F (36.8  C) Temp src: Axillary BP: 131/67    "Heart Rate: 82 Resp: 20 SpO2: 97 % O2 Device: None (Room air)    Vitals:    10/03/17 0900 10/04/17 0600   Weight: 145 lb (65.8 kg) 130 lb 15.3 oz (59.4 kg)     Vital Signs with Ranges  Temp:  [98  F (36.7  C)-98.4  F (36.9  C)] 98.3  F (36.8  C)  Heart Rate:  [60-94] 82  Resp:  [6-22] 20  BP: (106-180)/(57-97) 131/67  SpO2:  [93 %-98 %] 97 %  I/O last 3 completed shifts:  In: 1108.33 [P.O.:600; I.V.:508.33]  Out: 1000 [Urine:1000]    Heart Rate: 82, Blood pressure 131/67, pulse 94, temperature 98.3  F (36.8  C), temperature source Axillary, resp. rate 20, height 5' 10\" (1.778 m), weight 130 lb 15.3 oz (59.4 kg), SpO2 97 %.  130 lbs 15.25 oz  HEENT:  Normocephalic.  Right periorbital bruising and swelling.  PERRLA.  EOM s intact.    Neck:  Supple, non-tender, without lymphadenopathy.  Heart:  No peripheral edema  Lungs:  No SOB  Abdomen:  Soft, non-tender, non-distended.   Skin:  Warm and dry, good capillary refill.  Extremities:  Good radial and dorsalis pedis pulses bilaterally, no edema, cyanosis or clubbing.    NEUROLOGICAL EXAMINATION:     Mental status: Awake and alert, answers questions appropriately, speech is fluent.  Cranial nerves:  II-XII intact.   Motor:  Strength is 5/5 throughout the upper and lower extremities  Shoulder Abduction:  Right:  5/5   Left:  5/5  Biceps:                      Right:  5/5   Left:  5/5  Triceps:                     Right:  5/5   Left:  5/5  Wrist Extensors:       Right:  5/5   Left:  5/5  Wrist Flexors:           Right:  5/5   Left:  5/5  interosseus :            Right:  5/5   Left:  5/5  Hip Flexor:                Right: 5/5  Left:  5/5  Hip Adductor:             Right:  5/5  Left:  5/5  Hip Abductor:             Right:  5/5  Left:  5/5  Gastroc Soleus:        Right:  5/5  Left:  5/5  Tib/Ant:                      Right:  5/5  Left:  5/5  EHL:                     Right:  5/5  Left:  5/5  Sensation:  intact  Reflexes:  Negative Babinski.  Negative Clonus.    Coordination:  " Smooth finger to nose testing.   Negative pronator drift.   Gait:  Sitting up in chair; appears comfortable.     Medications        sodium chloride (PF)  3 mL Intracatheter Q8H     atorvastatin (LIPITOR) tablet 20 mg  20 mg Oral Daily     influenza Vac Split High-Dose  0.5 mL Intramuscular Prior to discharge       Data     CBC RESULTS:   Recent Labs   Lab Test  10/04/17   0430   WBC  6.7   RBC  4.03*   HGB  13.3   HCT  37.6*   MCV  93   MCH  33.0   MCHC  35.4   RDW  12.9   PLT  176     Basic Metabolic Panel:  Lab Results   Component Value Date     10/04/2017      Lab Results   Component Value Date    POTASSIUM 4.2 10/04/2017     Lab Results   Component Value Date    CHLORIDE 106 10/04/2017     Lab Results   Component Value Date    MING 8.2 10/04/2017     Lab Results   Component Value Date    CO2 28 10/04/2017     Lab Results   Component Value Date    BUN 20 10/04/2017     Lab Results   Component Value Date    CR 1.04 10/04/2017     Lab Results   Component Value Date     10/04/2017     INR:  Lab Results   Component Value Date    INR 1.04 10/04/2017    INR 1.15 10/03/2017    INR 2.60 10/03/2017    INR 1.71 03/14/2017    INR 1.84 03/13/2017    INR 2.42 03/12/2017    INR 2.04 03/11/2017

## 2017-10-04 NOTE — PROGRESS NOTES
10/04/17 1056   Quick Adds   Type of Visit Initial Occupational Therapy Evaluation   Living Environment   Lives With alone   Living Arrangements house   Number of Stairs to Enter Home 4   Number of Stairs Within Home 15   Self-Care   Dominant Hand right   General Information   Onset of Illness/Injury or Date of Surgery - Date 10/03/17   Referring Physician Paola   Patient/Family Goals Statement Go home.   Additional Occupational Profile Info/Pertinent History of Current Problem Admitted after fall that he doesn't remember. Dx traumatic ICH.   Precautions/Limitations fall precautions   Cognitive Status Examination   Orientation person;place   Level of Consciousness alert   Able to Follow Commands WNL/WFL   Personal Safety (Cognitive) at risk behaviors demonstrated   Memory impaired   Cognitive Comment Unoriented to month, day, date. No recall of information after short delay.    Pain Assessment   Patient Currently in Pain No   Range of Motion (ROM)   ROM Quick Adds No deficits were identified   Strength   Manual Muscle Testing Quick Adds No deficits were identified   Mobility   Bed Mobility Comments SB A   Transfer Skill: Bed to Chair/Chair to Bed   Level of [place]: Bed to Chair minimum assist (75% patients effort)   Transfer Skill: Sit to Stand   Level of [place]: Sit/Stand minimum assist (75% patients effort)   Balance   Balance Comments Unsteady on his feet. Reaches out for furniture to steady self.    Lower Body Dressing   Level of [place]: Dress Lower Body stand-by assist   Grooming   Level of [place]: Grooming contact guard   Eating/Self Feeding   Level of [place]: Eating independent   Activities of Daily Living Analysis   Impairments Contributing to Impaired Activities of Daily Living balance impaired;cognition impaired   General Therapy Interventions   Planned Therapy Interventions ADL retraining;cognition;transfer training   Clinical Impression   Criteria for Skilled  "Therapeutic Interventions Met yes, treatment indicated   OT Diagnosis Decreased ADL   Influenced by the following impairments balance, cognition   Assessment of Occupational Performance 1-3 Performance Deficits   Identified Performance Deficits dressing, transfers, IADL   Clinical Decision Making (Complexity) Low complexity   Therapy Frequency daily   Predicted Duration of Therapy Intervention (days/wks) 3 days   Anticipated Discharge Disposition Transitional Care Facility  (see POC note)   Risks and Benefits of Treatment have been explained. Yes   Patient, Family & other staff in agreement with plan of care Yes   [facility] TM \"6 Clicks\"   2016, Trustees of [facility], under license to [employer].  All rights reserved.   6 Clicks Short Forms Daily Activity Inpatient Short Form   [facility] AM-PAC  \"6 Clicks\" Daily Activity Inpatient Short Form   1. Putting on and taking off regular lower body clothing? 3 - A Little   2. Bathing (including washing, rinsing, drying)? 3 - A Little   3. Toileting, which includes using toilet, bedpan or urinal? 3 - A Little   4. Putting on and taking off regular upper body clothing? 3 - A Little   5. Taking care of personal grooming such as brushing teeth? 3 - A Little   6. Eating meals? 4 - None   Daily Activity Raw Score (Score out of 24.Lower scores equate to lower levels of function) 19   Total Evaluation Time   Total Evaluation Time (Minutes) 10     "

## 2017-10-04 NOTE — PROGRESS NOTES
" 10/04/17 1522   Quick Adds   Type of Visit Initial PT Evaluation   Living Environment   Lives With alone  (\"My Wife is in a nursing home.\" Cats \"[employer]\")   Living Arrangements house   Home Accessibility stairs to enter home;stairs within home;tub/shower is not walk in   Number of Stairs to Enter Home 4   Number of Stairs Within Home 15   Stair Railings at Home other (see comments)   Living Environment Comment [place] style home   Self-Care   Dominant Hand right   Usual Activity Tolerance good   Current Activity Tolerance fair   Activity/Exercise/Self-Care Comment Pt reports Selv[place] assists with laundry now that hi wife is in the NH.   Functional Level Prior   Ambulation 1-->assistive equipment   Transferring 0-->independent   Toileting 0-->independent   Bathing 0-->independent   Dressing 0-->independent   Eating 0-->independent   Communication 0-->understands/communicates without difficulty   Swallowing 0-->swallows foods/liquids without difficulty   Cognition 0 - no cognition issues reported   Fall history within last six months yes   Number of times patient has fallen within last six months 1   Which of the above functional risks had a recent onset or change? ambulation   Prior Functional Level Comment Per chart pt has a caregiver during the day; wife was presnet at admit; however, pt states she lives in a NH. Prior notes indicate pt's wfie is wc bound. Pt's daughter involved in care, not present at [facility].   General Information   Onset of Illness/Injury or Date of Surgery - Date 10/03/17   Referring Physician Wesley Lama MD   Patient/Family Goals Statement None stated.    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 Per H&P pt with recent balance problems per Daughter.  Pt on coumadin at time of " the fall.   Precautions/Limitations fall precautions   General Observations R eye black and blue   Cognitive Status Examination   Orientation person;place;time  (not situation, place waxes and wanes)   Level of Consciousness alert   Follows Commands and Answers Questions 100% of the time;able to follow single-step instructions   Personal Safety and Judgment impulsive   Cognitive Comment Per chart dementia   Integumentary/Edema   Integumentary/Edema Comments Black R eye    Posture    Posture Forward head position;Protracted shoulders   Range of Motion (ROM)   ROM Comment B LEs WFL as obs via functional mobiltiy   Strength   Strength Comments Demonstrates antigravity strength, MMTs not assessedas pt eager to get up and move.    Bed Mobility   Bed Mobility Comments SBA sup>sit   Transfer Skills   Transfer Comments CGA sit<>stand   Gait   Gait Comments Demonstrates shuffling gait, minimal clearance with feet, flexted trunk with bed<>bathroom gait, with WW. CGA, Min A to navigate walke rin bathroom.   Balance   Balance Comments Able to lean down t othe floor and tie shoes without LOB   Sensory Examination   Sensory Perception Comments Deneis n/t   General Therapy Interventions   Planned Therapy Interventions balance training;bed mobility training;gait training;neuromuscular re-education;ROM;strengthening;stretching;transfer training;progressive activity/exercise;home program guidelines   Clinical Impression   Criteria for Skilled Therapeutic Intervention yes, treatment indicated   PT Diagnosis Impaired Gait   Influenced by the following impairments Generalized weakness, decreaed activity toelrance, decreased balance in standing with dynamic activity   Functional limitations due to impairments Decreaesd functional independence   Clinical Presentation Stable/Uncomplicated   Clinical Presentation Rationale seeabove   Clinical Decision Making (Complexity) Low complexity   Therapy Frequency` daily   Predicted Duration of  "Therapy Intervention (days/wks) 4 days   Anticipated Discharge Disposition Transitional Care Facility   Risk & Benefits of therapy have been explained Yes   Patient, Family & other staff in agreement with plan of care Yes   [facility] TM \"6 Clicks\"   2016, Trustees of [facility], under license to [employer].  All rights reserved.   6 Clicks Short Forms Basic Mobility Inpatient Short Form   [facility]-PAC  \"6 Clicks\" V.2 Basic Mobility Inpatient Short Form   1. Turning from your back to your side while in a flat bed without using bedrails? 3 - A Little   2. Moving from lying on your back to sitting on the side of a flat bed without using bedrails? 3 - A Little   3. Moving to and from a bed to a chair (including a wheelchair)? 3 - A Little   4. Standing up from a chair using your arms (e.g., wheelchair, or bedside chair)? 3 - A Little   5. To walk in hospital room? 3 - A Little   6. Climbing 3-5 steps with a railing? 3 - A Little   Basic Mobility Raw Score (Score out of 24.Lower scores equate to lower levels of function) 18   Total Evaluation Time   Total Evaluation Time (Minutes) 10     "

## 2017-10-04 NOTE — PLAN OF CARE
Problem: Patient Care Overview  Goal: Plan of Care/Patient Progress Review  Outcome: No Change  Neuros unchaged. VSS. Pt updated on POC. Will cont to abiola walls.

## 2017-10-04 NOTE — PROGRESS NOTES
[facility]    Hospitalist Progress Note    Assessment & Plan   Mr. Erazo is an 89-year-old male with history of coronary artery disease, atrial fibrillation on chronic Coumadin, cerebrovascular accident, hyperlipidemia who presented to the emergency room with suspected fall and was found to have intracranial hemorrhage.     Suspected mechanical fall resulting in traumatic intracranial hemorrhage.    -There were no witnesses however, the patient does have significant periorbital bruising.   - It is suspected that the patient sustained a fall and had a traumatic intraventricular hemorrhage.  Likely mechanical as per the daughter he has been having balance issues lately.  -The patient has no recollection of this event.    -Neurosurgery-following; nonsurgical bleed  -Repeat head CT this morning is stable  -physical therapy and occupationalt therapy consult, transfer to floor today     History of atrial fibrillation on chronic anticoagulation.    -INR on presentation 2.6; reversed with vitamin K and Kcentra  -Continue to hold Coumadin, CHADVASC-4; eventually resume Coumadin when okay with neurosurgery  -Rate controlled, resume PTA metoprolol XL 25 mg daily    Coronary artery disease history.    -Continue prior to admission simvastatin and metoprolol     Multiple bruises.    -He does have bruises on bilateral elbows and a bruise on his back.    -PT/OT eval    Mild dementia:  -Reportedly patient does have mild dementia, patient was mildly confused overnight per nursing report  -Overall appears to be at baseline, continue to monitor    D/W: RN  DVT Prophylaxis: Pneumatic Compression Devices  Code Status: DNR/DNI    Disposition: Expected discharge in 1-2 days; likely will need TCU    Wesley Lama MD    Interval History   Mildly confused overnight.  Denies any headache or vision changes.  No focal weakness.  No pain    -Data reviewed today: I reviewed all new labs and imaging results over the last 24  hours. I personally reviewed no images or EKG's today.    Physical Exam   Temp: 98.3  F (36.8  C) Temp src: Axillary BP: 154/89 Pulse: 94 Heart Rate: 71 Resp: 18 SpO2: 95 % O2 Device: None (Room air)    Vitals:    10/03/17 0900 10/04/17 0600   Weight: 65.8 kg (145 lb) 59.4 kg (130 lb 15.3 oz)     Vital Signs with Ranges  Temp:  [98  F (36.7  C)-98.5  F (36.9  C)] 98.3  F (36.8  C)  Pulse:  [94] 94  Heart Rate:  [60-88] 71  Resp:  [6-22] 18  BP: (106-171)/(57-89) 154/89  SpO2:  [93 %-98 %] 95 %  I/O last 3 completed shifts:  In: 1108.33 [P.O.:600; I.V.:508.33]  Out: 1000 [Urine:1000]    Constitutional: AAOX3, NAD, Appears comfortable  HEENT: Right periorbital bruise-stable  Neck- Supple, Good ROM, No JVD  Respiratory:  No crackles, No wheezes, CTA B/L, Normal WOB  Cardiovascular: RRR, No murmur  GI: Soft, Non- tender, BS- normoactive, No Guarding/rebound/rigidity  Skin/Integument: Warm and dry, no rashes  MSK: No joint deformity or swelling, no edema  Neuro: CN- grossly intact, Motor strength 5/5 on all 4 extremities.    Medications        sodium chloride (PF)  3 mL Intracatheter Q8H     atorvastatin (LIPITOR) tablet 20 mg  20 mg Oral Daily     influenza Vac Split High-Dose  0.5 mL Intramuscular Prior to discharge       Data     Recent Labs  Lab 10/04/17  0430 10/03/17  1146 10/03/17  0900   WBC 6.7  --  6.4   HGB 13.3  --  15.1   MCV 93  --  93     --  201   INR 1.04 1.15* 2.60*     --  139   POTASSIUM 4.2  --  4.3   CHLORIDE 106  --  105   CO2 28  --  27   BUN 20  --  18   CR 1.04  --  1.07   ANIONGAP 6  --  7   MING 8.2*  --  8.7   *  --  99   ALBUMIN  --   --  3.9   PROTTOTAL  --   --  7.7   BILITOTAL  --   --  0.8   ALKPHOS  --   --  103   ALT  --   --  39   AST  --   --  51*   TROPI  --   --  <0.015       Recent Results (from the past 24 hour(s))   CT Maxillofacial w/o Contrast    Narrative    CT MAXILLOFACIAL WITHOUT CONTRAST  10/3/2017 9:20 AM     HISTORY: Fall, facial trauma.    TECHNIQUE:  Axial images were obtained through the facial bones without  contrast. Coronal and sagittal reconstructions were also acquired.  Radiation dose for this scan was reduced using automated exposure  control, adjustment of the mA and/or kV according to patient size, or  iterative reconstruction technique.    FINDINGS: The facial bones are intact. Specifically, the bony  mandible, pterygoid plates, zygomatic arches, nasal bones, and bony  orbits are intact. There is some soft tissue swelling over the right  supraorbital region. Both globes are intact. Retro-orbital structures  are normal.      Impression    IMPRESSION: Right supraorbital soft tissue swelling. No evidence for  any facial fractures.    MADDIE FAIR MD   CT Head w/o Contrast   Result Value    Radiologist flags Intracranial hemorrhage (AA)    Narrative    CT SCAN OF THE HEAD WITHOUT CONTRAST October 3, 2017 9:21 AM     HISTORY: Fall, on coumadin.    TECHNIQUE: Axial images of the head and coronal reformations without  IV contrast material. Radiation dose for this scan was reduced using  automated exposure control, adjustment of the mA and/or kV according  to patient size, or iterative reconstruction technique.    COMPARISON: 3/11/2017.    FINDINGS: There is a small amount of acute intraventricular hemorrhage  layering in the posterior horn of the left lateral ventricle. There is  a right supraorbital scalp hematoma. There is no evidence for any  underlying fracture. Vascular calcifications are noted. Cerebral  atrophy and scattered white matter changes are noted. There is no  evidence for parenchymal hemorrhage, acute infarct, or mass effect.      Impression    IMPRESSION:  1. Small amount of acute intraventricular hemorrhage layering in the  posterior horn of the left lateral ventricle.  2. Cerebral atrophy with chronic white matter changes.  3. Right supraorbital scalp hematoma. No evidence for fracture.    [Critical Result: Intracranial  hemorrhage]    Finding was identified on 10/3/2017 9:21 AM.     Dr. Leon was contacted by me on 10/3/2017 9:24 AM and verbalized  understanding of the critical result.      MADDIE FAIR MD   CT Head w/o Contrast    Narrative    CT OF THE HEAD WITHOUT CONTRAST 10/4/2017 5:09 AM     COMPARISON: Head CT 10/3/2017.    HISTORY: Intraventricular hemorrhage.    TECHNIQUE: 5 mm thick axial CT images of the head were acquired  without IV contrast material.    FINDINGS: Tiny amount of hyperdense hemorrhage layering in the  occipital horn of the left lateral ventricle is again noted without  change. There is no evidence for new or increasing intracranial  hemorrhage.     There is moderate diffuse cerebral volume loss. There are subtle  patchy areas of decreased density in the cerebral white matter  bilaterally that are consistent with sequela of chronic small vessel  ischemic disease. The ventricles and basal cisterns are within normal  limits in configuration given the degree of cerebral volume loss.   There is no midline shift. There are no extra-axial fluid collections.      No intracranial mass or recent infarct.    The visualized paranasal sinuses are well aerated. There is no  mastoiditis. There are no fractures of the visualized bones.       Impression    IMPRESSION:   1. Stable tiny hyperdense hemorrhage in the occipital horn of the left  lateral ventricle. No evidence for new or increasing intracranial  hemorrhage.  2. Diffuse cerebral volume loss and cerebral white matter changes  consistent with chronic small vessel ischemic disease.      Radiation dose for this scan was reduced using automated exposure  control, adjustment of the mA and/or kV according to patient size, or  iterative reconstruction technique.

## 2017-10-05 ENCOUNTER — APPOINTMENT (OUTPATIENT)
Dept: PHYSICAL THERAPY | Facility: CLINIC | Age: 82
DRG: 084 | End: 2017-10-05
Payer: MEDICARE

## 2017-10-05 ENCOUNTER — APPOINTMENT (OUTPATIENT)
Dept: OCCUPATIONAL THERAPY | Facility: CLINIC | Age: 82
DRG: 084 | End: 2017-10-05
Payer: MEDICARE

## 2017-10-05 LAB
MAGNESIUM SERPL-MCNC: 2.1 MG/DL (ref 1.6–2.3)
POTASSIUM SERPL-SCNC: 4.3 MMOL/L (ref 3.4–5.3)

## 2017-10-05 PROCEDURE — 90662 IIV NO PRSV INCREASED AG IM: CPT | Performed by: INTERNAL MEDICINE

## 2017-10-05 PROCEDURE — 25000132 ZZH RX MED GY IP 250 OP 250 PS 637: Mod: GY | Performed by: INTERNAL MEDICINE

## 2017-10-05 PROCEDURE — 97116 GAIT TRAINING THERAPY: CPT | Mod: GP | Performed by: PHYSICAL THERAPIST

## 2017-10-05 PROCEDURE — 83735 ASSAY OF MAGNESIUM: CPT | Performed by: HOSPITALIST

## 2017-10-05 PROCEDURE — 97750 PHYSICAL PERFORMANCE TEST: CPT | Mod: GP | Performed by: PHYSICAL THERAPIST

## 2017-10-05 PROCEDURE — 36415 COLL VENOUS BLD VENIPUNCTURE: CPT | Performed by: HOSPITALIST

## 2017-10-05 PROCEDURE — 40000915 ZZH STATISTIC SITTER, EVENING HOURS

## 2017-10-05 PROCEDURE — 97535 SELF CARE MNGMENT TRAINING: CPT | Mod: GO

## 2017-10-05 PROCEDURE — A9270 NON-COVERED ITEM OR SERVICE: HCPCS | Mod: GY | Performed by: INTERNAL MEDICINE

## 2017-10-05 PROCEDURE — 12000007 ZZH R&B INTERMEDIATE

## 2017-10-05 PROCEDURE — 25000128 H RX IP 250 OP 636: Performed by: INTERNAL MEDICINE

## 2017-10-05 PROCEDURE — 40000916 ZZH STATISTIC SITTER, NIGHT HOURS

## 2017-10-05 PROCEDURE — 25000125 ZZHC RX 250: Performed by: HOSPITALIST

## 2017-10-05 PROCEDURE — 99232 SBSQ HOSP IP/OBS MODERATE 35: CPT | Performed by: INTERNAL MEDICINE

## 2017-10-05 PROCEDURE — 40000193 ZZH STATISTIC PT WARD VISIT: Performed by: PHYSICAL THERAPIST

## 2017-10-05 PROCEDURE — 40000133 ZZH STATISTIC OT WARD VISIT

## 2017-10-05 PROCEDURE — 97110 THERAPEUTIC EXERCISES: CPT | Mod: GP | Performed by: PHYSICAL THERAPIST

## 2017-10-05 PROCEDURE — 84132 ASSAY OF SERUM POTASSIUM: CPT | Performed by: HOSPITALIST

## 2017-10-05 RX ORDER — METOPROLOL TARTRATE 1 MG/ML
2.5-5 INJECTION, SOLUTION INTRAVENOUS EVERY 4 HOURS PRN
Status: DISCONTINUED | OUTPATIENT
Start: 2017-10-05 | End: 2017-10-06

## 2017-10-05 RX ADMIN — METOPROLOL TARTRATE 2.5 MG: 5 INJECTION INTRAVENOUS at 23:10

## 2017-10-05 RX ADMIN — INFLUENZA A VIRUSA/MICHIGAN/45/2015 X-275 (H1N1) ANTIGEN (FORMALDEHYDE INACTIVATED), INFLUENZA A VIRUS A/HONG KONG/4801/2014 X-263B (H3N2) ANTIGEN (FORMALDEHYDE INACTIVATED), AND INFLUENZA B VIRUS B/BRISBANE/60/2008 ANTIGEN (FORMALDEHYDE INACTIVATED) 0.5 ML: 60; 60; 60 INJECTION, SUSPENSION INTRAMUSCULAR at 11:10

## 2017-10-05 RX ADMIN — METOPROLOL TARTRATE 2.5 MG: 5 INJECTION INTRAVENOUS at 01:40

## 2017-10-05 RX ADMIN — METOPROLOL TARTRATE 2.5 MG: 5 INJECTION INTRAVENOUS at 23:50

## 2017-10-05 RX ADMIN — ACETAMINOPHEN 650 MG: 325 TABLET, FILM COATED ORAL at 22:05

## 2017-10-05 RX ADMIN — METOPROLOL TARTRATE 2.5 MG: 5 INJECTION INTRAVENOUS at 00:33

## 2017-10-05 RX ADMIN — METOPROLOL SUCCINATE 25 MG: 25 TABLET, EXTENDED RELEASE ORAL at 09:33

## 2017-10-05 RX ADMIN — ATORVASTATIN CALCIUM 20 MG: 20 TABLET, FILM COATED ORAL at 09:33

## 2017-10-05 RX ADMIN — Medication 12.5 MG: at 11:37

## 2017-10-05 ASSESSMENT — VISUAL ACUITY
OU: NORMAL ACUITY;GLASSES
OU: NORMAL ACUITY;GLASSES
OU: NORMAL ACUITY
OU: NORMAL ACUITY;GLASSES
OU: NORMAL ACUITY;GLASSES

## 2017-10-05 NOTE — PROGRESS NOTES
Discussed events of last night with Dr Lama. Pt was very confused, less so this am but still trying to get OOB impulsively and has a sitter.  He is being started on seroquel and nursing will see if sitter can be dc'd this afternoon.  Updated SW. CM will continue to assist w/ dc planning.

## 2017-10-05 NOTE — PLAN OF CARE
Problem: Patient Care Overview  Goal: Plan of Care/Patient Progress Review  Outcome: No Change  Patient alert orientated except for time. Up with sba,bruised rt face.foam dressing on arms c/d/i.

## 2017-10-05 NOTE — PROGRESS NOTES
"CM    I:  LEAH placed call to patient's daughter, Selam, to discuss discharge plan.  Dtr confirms patient's spouse is currently living at [facility].  LEAH reviewed therapy recommendation for TCU. LEAH also updated daughter patient has shown an increase in confusion, thus an additional recommendation has been made for d/c to a Memory Care TCU.  Daughter stated she would not want patient discharged to [facility] or [facility] TCU for Memory Care but  would consider [facility] TCU, if applicable.  Daughter requests if patient can discharge to a \"regular\" TCU, they request [facility], accepting the extra daily cost for private room.  Dtr stated patient does have a history of increased confusion while in a hospital setting, and feels \"once out of the hospital, he may be clearer\".   Thus, daughter is hopeful patient can discharge to [facility].  Daughter also stated her second TCU choice would be: [facility].  LEAH explained at this point, referrals will be sent out to all facilities we discussed today.  LEAH will continue to follow and update daughter accordingly.  LEAH placed referrals thru [facility] and also sent email to [facility], per protocol.    P:  Continue to assist with discharge planning as needed.    MEENA Quintero    [id number]: LEAH received call from patient's home care agency:  [facility], [place] , asking for update which was provided.    "

## 2017-10-05 NOTE — PROGRESS NOTES
[facility]    Hospitalist Progress Note    Assessment & Plan   Mr. Erazo is an 89-year-old male with history of coronary artery disease, atrial fibrillation on chronic Coumadin, cerebrovascular accident, hyperlipidemia who presented to the emergency room with suspected fall and was found to have intracranial hemorrhage.     Suspected mechanical fall resulting in traumatic intracranial hemorrhage.    -There were no witnesses however, the patient does have significant periorbital bruising.   - It is suspected that the patient sustained a fall and had a traumatic intraventricular hemorrhage.  Likely mechanical as per the daughter he has been having balance issues lately.  -The patient has no recollection of this event.    -Repeat head CT- stable  -Neurosurgery-has signed off  -physical therapy and occupationalt therapy following     History of atrial fibrillation on chronic anticoagulation.    -INR on presentation 2.6; reversed with vitamin K and Kcentra  -Continue to hold Coumadin, per NS- Continue to hold Coumadin and all blood thinners due to patient's risk of recurring falls; if must restart wait at least 2 weeks  -Continue PTA metoprolol XL 25 mg daily  -RVR overnight; but rate controlled at the moment  -PRN IV metoprolol available    Coronary artery disease history.    -Continue prior to admission simvastatin and metoprolol     Multiple bruises.    -He does have bruises on bilateral elbows and a bruise on his back.    -PT/OT eval    Mild dementia:  -Reportedly patient does have mild dementia, patient was confused and agitated overnight requiring sitter  -Start seroquel low dose scheduled and prn; DC sitter  -Appears to be much calmer this AM      D/W: RN  DVT Prophylaxis: Pneumatic Compression Devices  Code Status: DNR/DNI    Disposition: Expected discharge 10/6 to TCU    Wesley Lama MD    Interval History   Confused and agitated overnight.  Denies any headache or vision changes.  No  focal weakness. RVR overnight      -Data reviewed today: I reviewed all new labs and imaging results over the last 24 hours. I personally reviewed no images or EKG's today.    Physical Exam   Temp: 98.1  F (36.7  C) Temp src: Oral BP: 132/78   Heart Rate: 73 Resp: 16 SpO2: 99 % O2 Device: None (Room air)    Vitals:    10/03/17 0900 10/04/17 0600   Weight: 65.8 kg (145 lb) 59.4 kg (130 lb 15.3 oz)     Vital Signs with Ranges  Temp:  [97.6  F (36.4  C)-98.6  F (37  C)] 98.1  F (36.7  C)  Heart Rate:  [] 73  Resp:  [16-20] 16  BP: ()/(56-87) 132/78  SpO2:  [95 %-99 %] 99 %  I/O last 3 completed shifts:  In: 980 [P.O.:980]  Out: 200 [Urine:200]    Constitutional: AAOX3, NAD, Appears comfortable  HEENT: Right periorbital bruise-unchanged  Neck- Supple, Good ROM, No JVD  Respiratory:  No crackles, No wheezes, CTA B/L, Normal WOB  Cardiovascular:Irregular; No murmur  GI: Soft, Non- tender, BS- normoactive, No Guarding/rebound/rigidity  Skin/Integument: Warm and dry, no rashes  MSK: No joint deformity or swelling, no edema  Neuro: CN- grossly intact, Motor strength 5/5 on all 4 extremities.    Medications        metoprolol  25 mg Oral Daily     sodium chloride (PF)  3 mL Intracatheter Q8H     atorvastatin (LIPITOR) tablet 20 mg  20 mg Oral Daily       Data     Recent Labs  Lab 10/05/17  0753 10/04/17  0430 10/03/17  1146 10/03/17  0900   WBC  --  6.7  --  6.4   HGB  --  13.3  --  15.1   MCV  --  93  --  93   PLT  --  176  --  201   INR  --  1.04 1.15* 2.60*   NA  --  140  --  139   POTASSIUM 4.3 4.2  --  4.3   CHLORIDE  --  106  --  105   CO2  --  28  --  27   BUN  --  20  --  18   CR  --  1.04  --  1.07   ANIONGAP  --  6  --  7   MING  --  8.2*  --  8.7   GLC  --  107*  --  99   ALBUMIN  --   --   --  3.9   PROTTOTAL  --   --   --  7.7   BILITOTAL  --   --   --  0.8   ALKPHOS  --   --   --  103   ALT  --   --   --  39   AST  --   --   --  51*   TROPI  --   --   --  <0.015       No results found for this or any  previous visit (from the past 24 hour(s)).

## 2017-10-05 NOTE — PROGRESS NOTES
10/05/17 0800   Signing Clinician's Name / Credentials   Signing clinician's name / credentials Ning Gaspar PT   Henley Balance Scale (NEFTALI HERNANDEZ, UZMA S, JAMES SUTTON, RAFIQ B: MEASURING BALANCE IN THE ELDERLY: VALIDATION OF AN INSTRUMENT. CAN. J. PUB. HEALTH, JULY/AUGUST SUPPLEMENT 2:S7-11, 1992.)   Sit To Stand 4   Standing Unsupported 4   Sitting Unsupported 4   Stand to Sit 4   Transfers 3   Standing with Eyes Closed 4   Standing Unsupported, Feet Together 2   Reach Forward With Outstretched Arm 3   Retrieve Object From Floor 3   Turning to Look Behind 2   Turn 360 Degrees 3   Placing Alternate Foot on Stool (4-6 inches) 1   Unsupported Tandem Stand (Demonstrate to Subject) 0   One Leg Stand 0   Total Score (A score of 45 or less has been correlated with an increased risk of falls)   Total Score (out of 56) 37

## 2017-10-05 NOTE — PLAN OF CARE
"Problem: Patient Care Overview  Goal: Plan of Care/Patient Progress Review  Discharge Planner PT   Patient plan for discharge: likely TCU per chart  Current status: Henley balance test 37/56, <45 indicative of falls.  Pt amb 500\"+ with FWW with SBA to CGA, requires UE support for dynamic balance and standing ex.  Per pt spouse had walker at home, he does not use, pt does provide contradicting hx, seems less confused now than recent nursing notes,  However was convinced he was  In [place], and did not believe PT that he was in [place].  Spoke of vacation plans he was making to [place], pt still drives, states he visits his wife in [place] but unable to name NH, initially stating he lives with spouse.  Requested SW consult.  Barriers to return to prior living situation: lives alone, fall risk, cog deficits affecting safety, concern for pt driving  Recommendations for discharge: TCU, will likely need increased services or change in home environment such as [facility]  Rationale for recommendations: to improve I in functional mob and safety       Entered by: GRETCHEN AMBROSIO 10/05/2017 9:14 AM         "

## 2017-10-05 NOTE — PLAN OF CARE
Discharge Planner OT   Patient plan for discharge: Home.   Current status: Pt transferred to the bedside chair with CGA. Educated on walker safety during transfers. While seated/standing, pt completed lower body dressing with CGA. Pt ambulated within room with CGA and verbal cues for safety.   Barriers to return to prior living situation: Impaired cognition and balance. Lives alone.   Recommendations for discharge: TCU   Rationale for recommendations: Needs continued therapy for I/ADLs.       Entered by: Forest Kim 10/05/2017 3:55 PM

## 2017-10-05 NOTE — PROGRESS NOTES
Hospitalist cross cover    Paged for A-fib with RVR ranging 110-130's.  Patient sleeping comfortably per RN.  Will order prn IV metoprolol for rates >120.  Will check K and mag with AM draw.

## 2017-10-05 NOTE — PLAN OF CARE
Problem: Fall Risk (Adult)  Intervention: Review Medications/Identify Contributors to Fall Risk  Disoriented to situation, place, time. Forgetful. Pt impulsive, able to disarm chair alarm, minimally cooperative with cares at times. Slight R facial droop. HR tachycardia, A fib c/RVR noted, Metoprolol 5 mg given, Tele resumed SR. K and Mg lab draw in AM. Other VSS.  Regular diet. Up with SBA. Pt has mepilex dressing on coccyx, refused to allow writer to examine. Denies pain. Continue to monitor.

## 2017-10-05 NOTE — PLAN OF CARE
"Problem: Patient Care Overview  Goal: Plan of Care/Patient Progress Review  Outcome: No Change  Pt a/ox2-3 with VS, runs HTN but in parameters over 160 but down with scheduled meds. Mood labile, compliant with assessment but setting off alarms multiple times/hr . CMS intact. On tele in SR. Neuros with slight R droop. R eye ecchymotic with scleral hemorrhage.   Bilat elbow abrasions CDI. Refused to have coccyx assessed by this writer but foam appears to be intact. Various bruising in stares of healing on body. Up in room with SBA. Tolerating regular diet. Sitter at bedside for safety, impulsive frequently agitated regarding alarms and safety measures stating \"I voluntarily checked myself into this resort and now you are holding me prisoner\". Educated on fall, alarms and ICH/IPH, pt sates \"I just had a checkup, the doctor told me my bleeding is fine and a lot of people have this, I should not be worried\". States has been off coumadin for multiple weeks and denies falling despite black and blue R eye. Daughter at bedside earlier, supportive of OT's assessment for TCU and planning to d/w SW further in am. D/c pending.      "

## 2017-10-06 ENCOUNTER — APPOINTMENT (OUTPATIENT)
Dept: OCCUPATIONAL THERAPY | Facility: CLINIC | Age: 82
DRG: 084 | End: 2017-10-06
Payer: MEDICARE

## 2017-10-06 ENCOUNTER — APPOINTMENT (OUTPATIENT)
Dept: PHYSICAL THERAPY | Facility: CLINIC | Age: 82
DRG: 084 | End: 2017-10-06
Payer: MEDICARE

## 2017-10-06 PROCEDURE — 99232 SBSQ HOSP IP/OBS MODERATE 35: CPT | Performed by: INTERNAL MEDICINE

## 2017-10-06 PROCEDURE — 97535 SELF CARE MNGMENT TRAINING: CPT | Mod: GO | Performed by: OCCUPATIONAL THERAPY ASSISTANT

## 2017-10-06 PROCEDURE — 97530 THERAPEUTIC ACTIVITIES: CPT | Mod: GO | Performed by: OCCUPATIONAL THERAPY ASSISTANT

## 2017-10-06 PROCEDURE — 97116 GAIT TRAINING THERAPY: CPT | Mod: GP | Performed by: PHYSICAL THERAPIST

## 2017-10-06 PROCEDURE — 97112 NEUROMUSCULAR REEDUCATION: CPT | Mod: GP | Performed by: PHYSICAL THERAPIST

## 2017-10-06 PROCEDURE — 12000000 ZZH R&B MED SURG/OB

## 2017-10-06 PROCEDURE — 40000193 ZZH STATISTIC PT WARD VISIT: Performed by: PHYSICAL THERAPIST

## 2017-10-06 PROCEDURE — A9270 NON-COVERED ITEM OR SERVICE: HCPCS | Mod: GY | Performed by: INTERNAL MEDICINE

## 2017-10-06 PROCEDURE — 40000133 ZZH STATISTIC OT WARD VISIT: Performed by: OCCUPATIONAL THERAPY ASSISTANT

## 2017-10-06 PROCEDURE — 25000132 ZZH RX MED GY IP 250 OP 250 PS 637: Mod: GY | Performed by: INTERNAL MEDICINE

## 2017-10-06 RX ORDER — METOPROLOL SUCCINATE 25 MG/1
25 TABLET, EXTENDED RELEASE ORAL 2 TIMES DAILY
Status: DISCONTINUED | OUTPATIENT
Start: 2017-10-06 | End: 2017-10-07

## 2017-10-06 RX ADMIN — Medication 12.5 MG: at 23:42

## 2017-10-06 RX ADMIN — METOPROLOL SUCCINATE 25 MG: 25 TABLET, EXTENDED RELEASE ORAL at 20:50

## 2017-10-06 RX ADMIN — METOPROLOL SUCCINATE 25 MG: 25 TABLET, EXTENDED RELEASE ORAL at 08:46

## 2017-10-06 RX ADMIN — ATORVASTATIN CALCIUM 20 MG: 20 TABLET, FILM COATED ORAL at 08:46

## 2017-10-06 ASSESSMENT — VISUAL ACUITY
OU: NORMAL ACUITY

## 2017-10-06 NOTE — PLAN OF CARE
Problem: Patient Care Overview  Goal: Plan of Care/Patient Progress Review  Outcome: Improving  A&O times 4, forgetful. Pleasant and cooperative. Neuros intact. Right eye ecchymosis. VSS, BP within parameters. Tele NSR. Regular diet, good appetite. Up with assist of one with GB and walker. Denies pain. Plan to discharge to TCU, pending placement.      Addendum [id number]: Patient continues to do well, neuro's intact. Ambulated in hallway, with assist of one with GB and walker, tolerating well.

## 2017-10-06 NOTE — PLAN OF CARE
Problem: Patient Care Overview  Goal: Plan of Care/Patient Progress Review  Outcome: No Change  Alert.  Oriented x3.  Varies on time and situation.  Regular diet; tolerates.  Ax1 with walker and GB.  Long-arm sit.  Patient redirectable.  Neuros intact.  VSS on RA.  Tele: NSR.  Refuses PCD's.  Removed dressing to coccyx; skin blanchable and red, but not open.  Continues with foam dressings to elbow wounds.  Right-sided facial bruise with right eye drainage continues.  PRN Tylenol at HS for minor neck pain, which appears to be effective.  Occasionally repositions self in bed.  Plan to discharge to TCU tomorrow.

## 2017-10-06 NOTE — PROGRESS NOTES
[facility]    Hospitalist Progress Note    Assessment & Plan   Mr. Erazo is an 89-year-old male with history of coronary artery disease, atrial fibrillation on chronic Coumadin, cerebrovascular accident, hyperlipidemia who presented to the emergency room with suspected fall and was found to have intracranial hemorrhage.     Suspected mechanical fall resulting in traumatic intracranial hemorrhage.    -There were no witnesses however, the patient does have significant periorbital bruising.   - It is suspected that the patient sustained a fall and had a traumatic intraventricular hemorrhage.  Likely mechanical as per the daughter he has been having balance issues lately.  -The patient has no recollection of this event.    -Repeat head CT- stable  -Neurosurgery-has signed off  -physical therapy and occupationalt therapy following     History of atrial fibrillation on chronic anticoagulation.    -INR on presentation 2.6; reversed with vitamin K and Kcentra  -Continue to hold Coumadin, per NS- Continue to hold Coumadin and all blood thinners due to patient's risk of recurring falls; if must restart wait at least 2 weeks  -Intermittent RVR including last night requiring IV metoprolol  -Increase PTA metoprolol XL to 25 mg BID  -PRN PO metoprolol available    Coronary artery disease history.    -Continue prior to admission simvastatin and metoprolol     Multiple bruises.    -periorbital bruising stable, also has a right subconjunctival hge  -Stable bruises on bilateral elbows and back.    -PT/OT eval    Mild dementia:  -Reportedly patient does have mild dementia, patient was confused and agitated 10/4  -Appears to be much calmer this AM  -Seroquel prn available      D/W: RN  DVT Prophylaxis: Pneumatic Compression Devices  Code Status: DNR/DNI    Disposition: Expected discharge 10/7 to TCU    Wesley Lama MD    Interval History   A. fib with RVR overnight requiring IV metoprolol.  Continues to be calm  since yesterday morning.  No acute nursing concern    -Data reviewed today: I reviewed all new labs and imaging results over the last 24 hours. I personally reviewed no images or EKG's today.    Physical Exam   Temp: 98.1  F (36.7  C) Temp src: Oral BP: 109/58 Pulse: 75 Heart Rate: 88 Resp: 16 SpO2: 96 % O2 Device: None (Room air)    Vitals:    10/03/17 0900 10/04/17 0600   Weight: 65.8 kg (145 lb) 59.4 kg (130 lb 15.3 oz)     Vital Signs with Ranges  Temp:  [98.1  F (36.7  C)-98.6  F (37  C)] 98.1  F (36.7  C)  Pulse:  [75] 75  Heart Rate:  [] 88  Resp:  [16] 16  BP: (103-145)/(58-80) 109/58  SpO2:  [95 %-99 %] 96 %  I/O last 3 completed shifts:  In: 600 [P.O.:600]  Out: -     Constitutional: AAOX3, NAD, Appears comfortable  HEENT: Right periorbital bruise-unchanged; rt supple conjunctival hemorrhage +;  visual equity intact on right side-on gross bedside testing  Respiratory:  No crackles, No wheezes, CTA B/L, Normal WOB  Cardiovascular: Irregular; No murmur  GI: Soft, Non- tender, BS- normoactive, No Guarding/rebound/rigidity  Skin/Integument: Warm and dry, no rashes  MSK: No joint deformity or swelling, no edema  Neuro: CN- grossly intact    Medications        metoprolol  25 mg Oral Daily     sodium chloride (PF)  3 mL Intracatheter Q8H     atorvastatin (LIPITOR) tablet 20 mg  20 mg Oral Daily       Data     Recent Labs  Lab 10/05/17  0753 10/04/17  0430 10/03/17  1146 10/03/17  0900   WBC  --  6.7  --  6.4   HGB  --  13.3  --  15.1   MCV  --  93  --  93   PLT  --  176  --  201   INR  --  1.04 1.15* 2.60*   NA  --  140  --  139   POTASSIUM 4.3 4.2  --  4.3   CHLORIDE  --  106  --  105   CO2  --  28  --  27   BUN  --  20  --  18   CR  --  1.04  --  1.07   ANIONGAP  --  6  --  7   MING  --  8.2*  --  8.7   GLC  --  107*  --  99   ALBUMIN  --   --   --  3.9   PROTTOTAL  --   --   --  7.7   BILITOTAL  --   --   --  0.8   ALKPHOS  --   --   --  103   ALT  --   --   --  39   AST  --   --   --  51*   TROPI  --    --   --  <0.015       No results found for this or any previous visit (from the past 24 hour(s)).

## 2017-10-06 NOTE — PLAN OF CARE
Problem: Patient Care Overview  Goal: Plan of Care/Patient Progress Review  Outcome: No Change  A&O. D/o time/situation. Neuros: slight R droop otherwise intact. VSS. Tele Afib with RVR at 2300, converted to SR with IV metoprolol.  Up with A1, GB, and walker. Regular diet. Plan for TCU today, will continue to monitor.

## 2017-10-06 NOTE — PROGRESS NOTES
D: SW following for DC planning. Anticipate DC Saturday.  I: Pt has been accepted at [facility]. Family prefers a double room, bed is available anytime and the [facility] team will follow. [facility] can assess on Saturday if they have an opening. SW updated pts dtr Selam. She is agreeable to this plan. She will transport.   P: SW will follow.     PRUDENCE Helton, LGSW  [id number]

## 2017-10-06 NOTE — PLAN OF CARE
Problem: Patient Care Overview  Goal: Plan of Care/Patient Progress Review  Discharge Planner PT   Patient plan for discharge: TCU     Current status: Patient completed supine <> sit with SBA. Sitting EOB at rest, /56 and HR 73bpm. Sit <> stand with FWW and SBA. Cues needed for safe sequencing.  Patient ambulated 500' with FWW and CGA. Noted some deficits with balance exercises.      Barriers to return to prior living situation: Current level of assist, balance deficits, decreased activity tolerance     Recommendations for discharge: TCU     Rationale for recommendations: Patient will benefit from continued skilled PT services in order to improve balance and activity tolerance.           Entered by: Crystal Cole 10/06/2017 2:31 PM

## 2017-10-06 NOTE — PLAN OF CARE
Problem: Patient Care Overview  Goal: Plan of Care/Patient Progress Review  Discharge Planner OT   Patient plan for discharge: home  Current status: completed cognitive assessment SLUMS with score of 13/30 which indicates severe cognitive deficits, impairments noted in STM, orientation stated year as 2007, attention and executive functions. Pt amb with FWW CGA to/from bathroom, stood at sink for ADLS CGA.    Barriers to return to prior living situation: lives alone, impaired cognition  Recommendations for discharge: TCU per plan established by the Occupational Therapist  Rationale for recommendations: pt would benefit from daily therapy to maximize safe and independence with ADLS/IADLS prior to returning home alone       Entered by: Sona Moore 10/06/2017 2:58 PM

## 2017-10-07 ENCOUNTER — APPOINTMENT (OUTPATIENT)
Dept: PHYSICAL THERAPY | Facility: CLINIC | Age: 82
DRG: 084 | End: 2017-10-07
Payer: MEDICARE

## 2017-10-07 ENCOUNTER — APPOINTMENT (OUTPATIENT)
Dept: OCCUPATIONAL THERAPY | Facility: CLINIC | Age: 82
DRG: 084 | End: 2017-10-07
Payer: MEDICARE

## 2017-10-07 VITALS
BODY MASS INDEX: 18.75 KG/M2 | TEMPERATURE: 98 F | WEIGHT: 130.95 LBS | RESPIRATION RATE: 16 BRPM | HEART RATE: 64 BPM | HEIGHT: 70 IN | DIASTOLIC BLOOD PRESSURE: 70 MMHG | OXYGEN SATURATION: 95 % | SYSTOLIC BLOOD PRESSURE: 120 MMHG

## 2017-10-07 LAB — GLUCOSE BLDC GLUCOMTR-MCNC: 107 MG/DL (ref 70–99)

## 2017-10-07 PROCEDURE — A9270 NON-COVERED ITEM OR SERVICE: HCPCS | Mod: GY | Performed by: INTERNAL MEDICINE

## 2017-10-07 PROCEDURE — 40000133 ZZH STATISTIC OT WARD VISIT: Performed by: OCCUPATIONAL THERAPIST

## 2017-10-07 PROCEDURE — 97112 NEUROMUSCULAR REEDUCATION: CPT | Mod: GP | Performed by: PHYSICAL THERAPIST

## 2017-10-07 PROCEDURE — 25000132 ZZH RX MED GY IP 250 OP 250 PS 637: Mod: GY | Performed by: INTERNAL MEDICINE

## 2017-10-07 PROCEDURE — 40000193 ZZH STATISTIC PT WARD VISIT: Performed by: PHYSICAL THERAPIST

## 2017-10-07 PROCEDURE — 97116 GAIT TRAINING THERAPY: CPT | Mod: GP | Performed by: PHYSICAL THERAPIST

## 2017-10-07 PROCEDURE — 99239 HOSP IP/OBS DSCHRG MGMT >30: CPT | Performed by: INTERNAL MEDICINE

## 2017-10-07 PROCEDURE — 97535 SELF CARE MNGMENT TRAINING: CPT | Mod: GO | Performed by: OCCUPATIONAL THERAPIST

## 2017-10-07 PROCEDURE — 00000146 ZZHCL STATISTIC GLUCOSE BY METER IP

## 2017-10-07 RX ORDER — METOPROLOL SUCCINATE 50 MG/1
50 TABLET, EXTENDED RELEASE ORAL 2 TIMES DAILY
Status: DISCONTINUED | OUTPATIENT
Start: 2017-10-07 | End: 2017-10-07 | Stop reason: HOSPADM

## 2017-10-07 RX ORDER — METOPROLOL SUCCINATE 50 MG/1
50 TABLET, EXTENDED RELEASE ORAL EVERY EVENING
Qty: 30 TABLET | DISCHARGE
Start: 2017-10-07 | End: 2018-09-06

## 2017-10-07 RX ORDER — METOPROLOL SUCCINATE 25 MG/1
25 TABLET, EXTENDED RELEASE ORAL EVERY MORNING
Qty: 30 TABLET | Refills: 1 | DISCHARGE
Start: 2017-10-07 | End: 2018-09-06

## 2017-10-07 RX ADMIN — ATORVASTATIN CALCIUM 20 MG: 20 TABLET, FILM COATED ORAL at 09:31

## 2017-10-07 RX ADMIN — METOPROLOL SUCCINATE 50 MG: 50 TABLET, EXTENDED RELEASE ORAL at 09:31

## 2017-10-07 ASSESSMENT — VISUAL ACUITY
OU: NORMAL ACUITY

## 2017-10-07 NOTE — PLAN OF CARE
Problem: Patient Care Overview  Goal: Plan of Care/Patient Progress Review  Outcome: Adequate for Discharge Date Met:  10/07/17  A&O times 3-4. forgetful. Neuros intact, except R facial droop. Ecchymosis around left eye, patient denies vision changes. VSS. Tele NSR. Regular diet, good appetite.  Up with assist of one with GB and walker. Voiding adequately in bathroom. Denies pain. Plan to discharge to transitional care unit this afternoon. Daughter will provide tranasporation.

## 2017-10-07 NOTE — PROGRESS NOTES
Social Work Progress Note  Pt chart reviewed. Pt discussed in interdisciplinary rounds.     Intervention: Pt was accepted at [facility] and [facility]. Pt's daughter Selam prefers [facility]. Writer cancelled the bed at [facility]. Selam confirmed that she is amenable to the $36.00 per day fee at [facility]. Selam plans to transport pt today between [phone]. Writer faxed d/c orders and PAS-R to [facility].     PAS-RR    D: Per DHS regulation, SW completed and submitted PAS-RR to [place] [employer] Direct Connect via the [employer] LinkAge Line.  PAS-RR confirmation # is : [id number]    P: Further questions may be directed to [employer] Line at #[phone], option #4 for PAS-RR staff.      Team members notified: Bedside RN, CC, &HUC     Plan:  Anticipated Discharge Placement: [facility] (P: [phone]; F: [phone])  Barriers: None identified at this time.   Follow-up plan:  No further plans to follow. Sw available should a need arise.     PRUDENCE Rodarte, [facility]  [phone]  12:42 PM

## 2017-10-07 NOTE — PLAN OF CARE
Problem: Patient Care Overview  Goal: Plan of Care/Patient Progress Review  Discharge Planner PT   Patient plan for discharge: TCU     Current status: Patient completed sit <> stand transfers with FWW and SBA. Patient ambulated 500' with shoes on, FWW, and CGA. Attempted ambulating without walker but resumed with walker secondary to balance deficits.      Barriers to return to prior living situation: Balance deficits, decreased activity tolerance, cognition     Recommendations for discharge: TCU     Rationale for recommendations: Patient will benefit from continued skilled PT intervention in order to address balance deficits and improve safety with functional mobility.        Entered by: Crystal Cole 10/07/2017 10:04 AM

## 2017-10-07 NOTE — PLAN OF CARE
Problem: Patient Care Overview  Goal: Plan of Care/Patient Progress Review  Outcome: Improving  A&O. D/o to time. No change in Neuros: slight right droop otherwise intact. VSS. Tele NSR. At 2300 pt went into Afib RVR, PRN metoprolol 12.5mg given with relief. Regular diet. Up with SBA and walker. Voiding adequately. Denies pain. Possible discharge today, will continue to monitor.

## 2017-10-07 NOTE — PLAN OF CARE
Problem: Patient Care Overview  Goal: Plan of Care/Patient Progress Review  Physical Therapy Discharge Summary     Reason for therapy discharge:    Discharged to transitional care facility.     Progress towards therapy goal(s). See goals on Care Plan in [facility] electronic health record for goal details.  Goals partially met.  Barriers to achieving goals:   discharge from facility.     Therapy recommendation(s):    Continued therapy is recommended.  Rationale/Recommendations:  Patient will benefit from continued skilled PT intervention in order to improve activity tolerance and balance. .

## 2017-10-07 NOTE — DISCHARGE SUMMARY
[facility]    Discharge Summary  Hospitalist    Date of Admission:  10/3/2017  Date of Discharge:  10/7/2017  Discharging Provider: Wesley Lama MD    Discharge Diagnoses     Suspected mechanical fall resulting in traumatic intracranial hemorrhage.    History of atrial fibrillation on chronic anticoagulation.    Coronary artery disease history.   Multiple bruises due to fall    Mild dementia       Hospital Course   Mr. Erazo is an 89-year-old male with history of coronary artery disease, atrial fibrillation on chronic Coumadin, cerebrovascular accident, hyperlipidemia who presented to the emergency room with suspected fall and was found to have intracranial hemorrhage.      Suspected mechanical fall resulting in traumatic intracranial hemorrhage.    -There were no witnesses however, the patient does have significant periorbital bruising.   - It is suspected that the patient sustained a fall and had a traumatic intraventricular hemorrhage.  Likely mechanical as per the daughter he has been having balance issues lately.  -The patient had no recollection of this event.    -Repeat head CT- stable  -Neurosurgery was consulted, nonsurgical bleed, eventually signed off after stability  -physical therapy and occupationalt therapy evaluated the patient and recommended TCU.      History of atrial fibrillation on chronic anticoagulation.    -INR on presentation 2.6; reversed with vitamin K and Kcentra  -Continue to hold Coumadin, per NS  -Continue to hold Coumadin and all blood thinners due to patient's risk of recurring falls; if must restart wait at least 2 weeks; discussed with the daughter on the day of discharge, will hold Coumadin for at least two weeks, she is going to discuss with his primary cardiologist about resumption and the timing.  -Intermittent RVR including last night requiring IV metoprolol  -Increase PTA metoprolol XL to 50 mg at night and 25 mg in the morning     Coronary artery  disease history.    -Continue prior to admission simvastatin and metoprolol      Multiple bruises.    -periorbital bruising stable, also has a right subconjunctival hge  -Stable bruises on bilateral elbows and back.    -PT/OT eval     Mild dementia:  -Reportedly patient does have mild dementia, patient was confused and agitated intermittently during the course of hospital stay  -Appears to be mostly calm by the day of discharge      Wesley Lama MD    Significant Results and Procedures   See below    Pending Results     Unresulted Labs Ordered in the Past 30 Days of this Admission     No orders found from 8/4/2017 to 10/4/2017.          Code Status   DNR / DNI       Primary Care Physician   Physician No Ref-Primary    Physical Exam   Temp: 98  F (36.7  C) Temp src: Oral BP: 120/70   Heart Rate: 90 Resp: 16 SpO2: 95 % O2 Device: None (Room air)      Constitutional: AAOX2, answering simple questions.  Appropriate  HEENT-right arjun- orbital bruise and subconjunctival hemorrhages improving  Respiratory: CTA B/L, Normal WOB  Cardiovascular: Irregular, No murmur  GI: Soft, Non- tender, BS- normoactive  Skin/Integument: Warm and dry, no rashes  MSK: No joint deformity or swelling, no edema  Neuro: CN- grossly intact, Moving all 4.     Discharge Disposition   Discharged to short-term care facility  Condition at discharge: Stable    Consultations This Hospital Stay   SWALLOW EVAL SPEECH PATH AT BEDSIDE IP CONSULT  PHYSICAL THERAPY ADULT IP CONSULT  OCCUPATIONAL THERAPY ADULT IP CONSULT  NEUROSURGERY IP CONSULT  PHYSICAL THERAPY ADULT IP CONSULT  OCCUPATIONAL THERAPY ADULT IP CONSULT    Time Spent on this Encounter   I, Wesley Lama, personally saw the patient today and spent greater than 30 minutes discharging this patient.    Discharge Orders     CT Head w/o contrast*   Administration of IV contrast (contrast agent, dose, and amount) will be tailored to this examination per the appropriate written protocol listed  in the Protocol E-Book, or by the supervising imaging provider.      Follow-up and recommended labs and tests    [facility] weeks with repeat CT of head prior.  Please call the clinic at [phone] to schedule your appointment with Rashawn Garcia PA-C or Janet Salazar PA-C.     General info for SNF   Length of Stay Estimate: Short Term Care: Estimated # of Days <30  Condition at Discharge: Improving  Level of care:skilled   Rehabilitation Potential: Good  Admission H&P remains valid and up-to-date: Yes  Recent Chemotherapy: N/A  Use Nursing Home Standing Orders: N/A     Mantoux instructions   Give two-step Mantoux (PPD) Per Facility Policy Yes     Follow Up and recommended labs and tests   Follow up with Nursing home physician.    Neurosurgery in 4 weeks     Activity - Up with nursing assistance     Reason for your hospital stay   ICH     DNR/DNI     Physical Therapy Adult Consult   Evaluate and treat as clinically indicated.    Reason:     Occupational Therapy Adult Consult   Evaluate and treat as clinically indicated.    Reason:     Fall precautions     Advance Diet as Tolerated   Follow this diet upon discharge: Orders Placed This Encounter     Combination Diet Regular Diet Adult         Discharge Medications   Current Discharge Medication List      CONTINUE these medications which have CHANGED    Details   !! metoprolol (TOPROL-XL) 25 MG 24 hr tablet Take 1 tablet (25 mg) by mouth every morning  Qty: 30 tablet, Refills: 1    Comments: Hold for SBP less than 100 or HR < 60  Associated Diagnoses: Chronic atrial fibrillation (H)      !! metoprolol (TOPROL-XL) 50 MG 24 hr tablet Take 1 tablet (50 mg) by mouth every evening  Qty: 30 tablet    Comments: Hold for SBP less than 100 or HR < 60  Associated Diagnoses: Chronic atrial fibrillation (H)       !! - Potential duplicate medications found. Please discuss with provider.      CONTINUE these medications which have NOT CHANGED     Details   Atorvastatin Calcium (LIPITOR PO) Take 20 mg by mouth daily         STOP taking these medications       warfarin (COUMADIN) 5 MG tablet Comments:   Reason for Stopping:             Allergies   No Known Allergies  Data   Most Recent 3 CBC's:  Recent Labs   Lab Test  10/04/17   0430  10/03/17   0900  03/14/17   0756   WBC  6.7  6.4  3.9*   HGB  13.3  15.1  12.7*   MCV  93  93  92   PLT  176  201  136*      Most Recent 3 BMP's:  Recent Labs   Lab Test  10/05/17   0753  10/04/17   0430  10/03/17   0900  03/12/17   0910   NA   --   140  139  142   POTASSIUM  4.3  4.2  4.3  4.1   CHLORIDE   --   106  105  109   CO2   --   28  27  26   BUN   --   20  18  19   CR   --   1.04  1.07  1.07   ANIONGAP   --   6  7  7   MING   --   8.2*  8.7  7.7*   GLC   --   107*  99  86     Most Recent 2 LFT's:  Recent Labs   Lab Test  10/03/17   0900  03/11/17   1525   AST  51*  47*   ALT  39  30   ALKPHOS  103  78   BILITOTAL  0.8  1.2     Most Recent INR's and Anticoagulation Dosing History:  Anticoagulation Dose History     Recent Dosing and Labs Latest Ref Rng & Units 3/11/2017 3/12/2017 3/13/2017 3/14/2017 10/3/2017 10/3/2017 10/4/2017    Warfarin 2 mg - - 2 mg - - - - -    Warfarin 4 mg - - - 4 mg - - - -    Warfarin 5 mg - 5 mg - - - - - -    INR 0.86 - 1.14 2.04(H) 2.42(H) 1.84(H) 1.71(H) 2.60(H) 1.15(H) 1.04        Most Recent 3 Troponin's:  Recent Labs   Lab Test  10/03/17   0900  03/11/17   1525  10/24/16   1209   TROPI  <0.015  <0.015  The 99th percentile for upper reference range is 0.045 ug/L.  Troponin values in   the range of 0.045 - 0.120 ug/L may be associated with risks of adverse   clinical events.    <0.015  The 99th percentile for upper reference range is 0.045 ug/L.  Troponin values in   the range of 0.045 - 0.120 ug/L may be associated with risks of adverse   clinical events.       Most Recent Cholesterol Panel:  Recent Labs   Lab Test  05/17/13   1505   CHOL  109   LDL  50   HDL  47   TRIG  65     Most Recent  6 Bacteria Isolates From Any Culture (See EPIC Reports for Culture Details):No lab results found.  Most Recent TSH, T4 and A1c Labs:  Recent Labs   Lab Test  03/11/17   1525   TSH  0.65       Results for orders placed or performed during the hospital encounter of 10/03/17   CT Head w/o Contrast     Value    Radiologist flags Intracranial hemorrhage (AA)    Narrative    CT SCAN OF THE HEAD WITHOUT CONTRAST October 3, 2017 9:21 AM     HISTORY: Fall, on coumadin.    TECHNIQUE: Axial images of the head and coronal reformations without  IV contrast material. Radiation dose for this scan was reduced using  automated exposure control, adjustment of the mA and/or kV according  to patient size, or iterative reconstruction technique.    COMPARISON: 3/11/2017.    FINDINGS: There is a small amount of acute intraventricular hemorrhage  layering in the posterior horn of the left lateral ventricle. There is  a right supraorbital scalp hematoma. There is no evidence for any  underlying fracture. Vascular calcifications are noted. Cerebral  atrophy and scattered white matter changes are noted. There is no  evidence for parenchymal hemorrhage, acute infarct, or mass effect.      Impression    IMPRESSION:  1. Small amount of acute intraventricular hemorrhage layering in the  posterior horn of the left lateral ventricle.  2. Cerebral atrophy with chronic white matter changes.  3. Right supraorbital scalp hematoma. No evidence for fracture.    [Critical Result: Intracranial hemorrhage]    Finding was identified on 10/3/2017 9:21 AM.     Dr. Leon was contacted by me on 10/3/2017 9:24 AM and verbalized  understanding of the critical result.      MADDIE FAIR MD   CT Maxillofacial w/o Contrast    Narrative    CT MAXILLOFACIAL WITHOUT CONTRAST  10/3/2017 9:20 AM     HISTORY: Fall, facial trauma.    TECHNIQUE: Axial images were obtained through the facial bones without  contrast. Coronal and sagittal reconstructions were also  acquired.  Radiation dose for this scan was reduced using automated exposure  control, adjustment of the mA and/or kV according to patient size, or  iterative reconstruction technique.    FINDINGS: The facial bones are intact. Specifically, the bony  mandible, pterygoid plates, zygomatic arches, nasal bones, and bony  orbits are intact. There is some soft tissue swelling over the right  supraorbital region. Both globes are intact. Retro-orbital structures  are normal.      Impression    IMPRESSION: Right supraorbital soft tissue swelling. No evidence for  any facial fractures.    MADDIE FAIR MD   CT Head w/o Contrast    Narrative    CT OF THE HEAD WITHOUT CONTRAST 10/4/2017 5:09 AM     COMPARISON: Head CT 10/3/2017.    HISTORY: Intraventricular hemorrhage.    TECHNIQUE: 5 mm thick axial CT images of the head were acquired  without IV contrast material.    FINDINGS: Tiny amount of hyperdense hemorrhage layering in the  occipital horn of the left lateral ventricle is again noted without  change. There is no evidence for new or increasing intracranial  hemorrhage.     There is moderate diffuse cerebral volume loss. There are subtle  patchy areas of decreased density in the cerebral white matter  bilaterally that are consistent with sequela of chronic small vessel  ischemic disease. The ventricles and basal cisterns are within normal  limits in configuration given the degree of cerebral volume loss.   There is no midline shift. There are no extra-axial fluid collections.      No intracranial mass or recent infarct.    The visualized paranasal sinuses are well aerated. There is no  mastoiditis. There are no fractures of the visualized bones.       Impression    IMPRESSION:   1. Stable tiny hyperdense hemorrhage in the occipital horn of the left  lateral ventricle. No evidence for new or increasing intracranial  hemorrhage.  2. Diffuse cerebral volume loss and cerebral white matter changes  consistent with chronic small  vessel ischemic disease.      Radiation dose for this scan was reduced using automated exposure  control, adjustment of the mA and/or kV according to patient size, or  iterative reconstruction technique.    ROSCOE AGUILAR MD

## 2017-10-07 NOTE — DISCHARGE INSTRUCTIONS
Pt to lizz to [facility] (P: [phone]; F: [phone]) between [phone].

## 2017-10-07 NOTE — PLAN OF CARE
Problem: Patient Care Overview  Goal: Plan of Care/Patient Progress Review  Discharge Planner OT   Patient plan for discharge: TCU  Current status: pt was CGA with transfers and ADL's up at sink  Barriers to return to prior living situation: confusion, decreased balance  Recommendations for discharge: TCU  Rationale for recommendations: pt would benefit from daily therapy       Entered by: Riana Taveras 10/07/2017 1:11 PM

## 2017-10-09 ENCOUNTER — NURSING HOME VISIT (OUTPATIENT)
Dept: GERIATRICS | Facility: CLINIC | Age: 82
End: 2017-10-09
Payer: MEDICARE

## 2017-10-09 VITALS
OXYGEN SATURATION: 96 % | RESPIRATION RATE: 18 BRPM | WEIGHT: 153.1 LBS | BODY MASS INDEX: 21.97 KG/M2 | DIASTOLIC BLOOD PRESSURE: 65 MMHG | HEART RATE: 71 BPM | TEMPERATURE: 96.8 F | SYSTOLIC BLOOD PRESSURE: 120 MMHG

## 2017-10-09 DIAGNOSIS — T07.XXXA MULTIPLE BRUISES: ICD-10-CM

## 2017-10-09 DIAGNOSIS — I48.20 CHRONIC ATRIAL FIBRILLATION (H): ICD-10-CM

## 2017-10-09 DIAGNOSIS — I25.119 CORONARY ARTERY DISEASE INVOLVING NATIVE HEART WITH ANGINA PECTORIS, UNSPECIFIED VESSEL OR LESION TYPE (H): ICD-10-CM

## 2017-10-09 DIAGNOSIS — R53.81 PHYSICAL DECONDITIONING: ICD-10-CM

## 2017-10-09 DIAGNOSIS — Z79.01 ANTICOAGULATED ON COUMADIN: ICD-10-CM

## 2017-10-09 DIAGNOSIS — F03.A0 MILD DEMENTIA (H): ICD-10-CM

## 2017-10-09 DIAGNOSIS — Z71.89 COUNSELING REGARDING ADVANCED DIRECTIVES: ICD-10-CM

## 2017-10-09 DIAGNOSIS — I62.9 INTRACRANIAL HEMORRHAGE (H): ICD-10-CM
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
## 2017-10-09 PROCEDURE — 99309 SBSQ NF CARE MODERATE MDM 30: CPT | Performed by: NURSE PRACTITIONER

## 2017-10-09 RX ORDER — SENNOSIDES 8.6 MG
1 TABLET ORAL DAILY PRN
COMMUNITY
End: 2018-09-06

## 2017-10-09 NOTE — PROGRESS NOTES
[facility]  PRIMARY CARE PROVIDER AND CLINIC:  No Ref-Primary, Physician NO REF-PRIMARY PHYSICIAN  Chief Complaint   Patient presents with     Hospital F/U       HPI:    Rell Erazo is a 89 year old  (6/5/1928),admitted to the [facility] from [facility].  Hospital stay 10/3/17 through 10/7/17.  Admitted to this facility for  rehab, medical management and nursing care.  HPI information obtained from: facility chart records.  Current issues are:         Fall  Intracranial hemorrhage (H)  Chronic atrial fibrillation (H)  Anticoagulated on Coumadin  CAD (coronary artery disease)  Multiple bruises  Mild dementia  Physical deconditioning     Hospital Course:  Mr. Erazo is an 89-year-old male with history of coronary artery disease, atrial fibrillation on chronic Coumadin, cerebrovascular accident, hyperlipidemia who presented to the emergency room with suspected fall and was found to have intracranial hemorrhage.       Suspected mechanical fall resulting in traumatic intracranial hemorrhage.    -There were no witnesses however, the patient does have significant periorbital bruising.   - It is suspected that the patient sustained a fall and had a traumatic intraventricular hemorrhage.  Likely mechanical as per the daughter he has been having balance issues lately.  -The patient had no recollection of this event.    -Repeat head CT- stable  -Neurosurgery was consulted, non surgical bleed, eventually signed off after stability  -physical therapy and occupational therapy evaluated the patient and recommended TCU.       History of atrial fibrillation on chronic anticoagulation.    -INR on presentation 2.6; reversed with vitamin K and Kcentra  -Continue to hold Coumadin, per NS  -Continue to hold Coumadin and all blood thinners due to patient's risk of recurring falls; if must restart wait at least 2 weeks; discussed with the daughter on the day of  discharge, will hold Coumadin for at least two weeks, she is going to discuss with his primary cardiologist about resumption and the timing.  -Intermittent RVR including last night requiring IV metoprolol  -Increase PTA metoprolol XL to 50 mg at night and 25 mg in the morning      Coronary artery disease history.    -Continue prior to admission simvastatin and metoprolol       Multiple bruises.    -periorbital bruising stable, also has a right subconjunctival hemorrhage  -Stable bruises on bilateral elbows and back.    -PT/OT eval      Mild dementia:  -Reportedly patient does have mild dementia, patient was confused and agitated intermittently during the course of hospital stay  -Appears to be mostly calm by the day of discharge       Forgetful--No CP, SOB, Cough, dizziness, fevers, chills, HA, N/V, dysuria or Bowel Abnormalities. Appetite is fair.  No pain except  Elbows sore as is right side face.      CODE STATUS/ADVANCE DIRECTIVES DISCUSSION:   FULL CODE-d/w pt and daughter  Patient's living condition: lives alone    ALLERGIES:Review of patient's allergies indicates no known allergies.  PAST MEDICAL HISTORY:  has a past medical history of CAD (coronary artery disease); CVA (cerebral infarction) (2013); and Hyperlipidemia LDL goal < 100.  PAST SURGICAL HISTORY:  has a past surgical history that includes Cardiac surgery (4/2012).  FAMILY HISTORY: family history is not on file.  SOCIAL HISTORY:  reports that he has never smoked. He does not have any smokeless tobacco history on file. He reports that he drinks alcohol.    Post Discharge Medication Reconciliation Status: discharge medications reconciled and changed, per note/orders (see AVS).  Current Outpatient Prescriptions   Medication Sig Dispense Refill     Acetaminophen (TYLENOL PO) Take 650 mg by mouth every 6 hours as needed for mild pain or fever       sennosides (SENOKOT) 8.6 MG tablet Take 1 tablet by mouth daily as needed for constipation       metoprolol  (TOPROL-XL) 25 MG 24 hr tablet Take 1 tablet (25 mg) by mouth every morning 30 tablet 1     metoprolol (TOPROL-XL) 50 MG 24 hr tablet Take 1 tablet (50 mg) by mouth every evening 30 tablet      Atorvastatin Calcium (LIPITOR PO) Take 20 mg by mouth daily         ROS:  Unobtainable secondary to cognitive impairment or aphasia.    Exam:  /65  Pulse 71  Temp 96.8  F (36  C)  Resp 18  Wt 153 lb 1.6 oz (69.4 kg)  SpO2 96%  BMI 21.97 kg/m2  GENERAL APPEARANCE:  Alert, in no distress, oriented, cooperative, vague  ENT:  Mouth and posterior oropharynx normal, moist mucous membranes, normal hearing acuity  EYES:  EOM, conjunctivae, lids, pupils and irises normal  NECK:  No adenopathy,masses or thyromegaly  RESP:  respiratory effort and palpation of chest normal, lungs clear to auscultation , no respiratory distress  CV:  Palpation and auscultation of heart done , regular rate and rhythm, no murmur, rub, or gallop, no edema, +2 pedal pulses  ABDOMEN:  normal bowel sounds, soft, nontender, no hepatosplenomegaly or other masses  M/S:   FREIRE equally  SKIN:  Inspection of skin and subcutaneous tissue baseline, elbows with abrasions--healing. one open on each elbow with serous drainage, clean and no infection.  right temporal and face areas with bruising.  NEURO:   Cranial nerves 2-12 are normal tested and grossly at patient's baseline  PSYCH:  oriented X 3, insight and judgement impaired, memory impaired , affect and mood normal    BP: 110-149/64-71 mmHg    Lab/Diagnostic data:  CBC RESULTS:   Recent Labs   Lab Test  10/04/17   0430  10/03/17   0900   WBC  6.7  6.4   RBC  4.03*  4.56   HGB  13.3  15.1   HCT  37.6*  42.4   MCV  93  93   MCH  33.0  33.1*   MCHC  35.4  35.6   RDW  12.9  12.9   PLT  176  201       Last Basic Metabolic Panel:  Recent Labs   Lab Test  10/05/17   0753  10/04/17   0430  10/03/17   0900   NA   --   140  139   POTASSIUM  4.3  4.2  4.3   CHLORIDE   --   106  105   MING   --   8.2*  8.7   CO2   --    28  27   BUN   --   20  18   CR   --   1.04  1.07   GLC   --   107*  99       Liver Function Studies -   Recent Labs   Lab Test  10/03/17   0900  03/11/17   1525   PROTTOTAL  7.7  7.7   ALBUMIN  3.9  4.0   BILITOTAL  0.8  1.2   ALKPHOS  103  78   AST  51*  47*   ALT  39  30     ASSESSMENT / PLAN:  (W19.XXXA) Fall  (primary encounter diagnosis)   (I62.9) Intracranial hemorrhage (H)  (T07.XXXA) Multiple bruises  (R53.81) Physical deconditioning  Comment/Plan : off coumadin, PT and OT--goal is to go home. possibly he should not be driving still??    (I48.2) Chronic atrial fibrillation   (Z51.81,  Z79.01) Anticoagulated on Coumadin  Comment/Plan: off coumadin, Alvin to review with Cards as to when and if to resume.    (I25.10) CAD (coronary artery disease)  Comment/Plan: cont BB, monitor.     (F03.90) Mild dementia  Comment/Plan: d/w daughter, she feels he would want to be a FULL Code--d/w pt also, code status changes.     (Z71.89) Advanced Directive  Comment:   POLST reviewed and changed--see above.      Electronically signed by:  ADRYAN Castellanos CNP

## 2017-10-11 VITALS
SYSTOLIC BLOOD PRESSURE: 140 MMHG | BODY MASS INDEX: 21.97 KG/M2 | RESPIRATION RATE: 18 BRPM | TEMPERATURE: 97.7 F | OXYGEN SATURATION: 97 % | DIASTOLIC BLOOD PRESSURE: 85 MMHG | HEART RATE: 65 BPM | WEIGHT: 153.1 LBS

## 2017-10-12 ENCOUNTER — NURSING HOME VISIT (OUTPATIENT)
Dept: GERIATRICS | Facility: CLINIC | Age: 82
End: 2017-10-12
Payer: MEDICARE

## 2017-10-12 DIAGNOSIS — R53.81 PHYSICAL DECONDITIONING: Primary | ICD-10-CM

## 2017-10-12 DIAGNOSIS — F03.A0 MILD DEMENTIA (H): ICD-10-CM

## 2017-10-12 DIAGNOSIS — S06.350D TRAUMATIC HEMORRHAGE OF LEFT CEREBRUM WITHOUT LOSS OF CONSCIOUSNESS, SUBSEQUENT ENCOUNTER: ICD-10-CM

## 2017-10-12 DIAGNOSIS — I48.20 CHRONIC ATRIAL FIBRILLATION (H): ICD-10-CM

## 2017-10-12 DIAGNOSIS — I10 ESSENTIAL HYPERTENSION: ICD-10-CM

## 2017-10-12 PROCEDURE — 99305 1ST NF CARE MODERATE MDM 35: CPT | Performed by: INTERNAL MEDICINE

## 2017-10-12 PROCEDURE — 99207 ZZC CDG-CORRECTLY CODED, REVIEWED AND AGREE: CPT | Performed by: INTERNAL MEDICINE

## 2017-10-12 NOTE — PROGRESS NOTES
PRIMARY CARE PROVIDER AND CLINIC RESPONSIBLE:  No Ref-Primary, Physician, NO REF-PRIMARY PHYSICIAN        ADMISSION HISTORY AND PHYSICAL EXAMINATION     Chief Complaint   Patient presents with     Fatigue     Fall         HISTORY OF PRESENT ILLNESS:  89 year old male, (6/5/1928), admitted to the [facility] TCU for continuation of medical care and rehab.    Rell Erazo is a 89 year old male with history of coronary artery disease, atrial fibrillation on chronic Coumadin, cerebrovascular accident, hyperlipidemia and mild dementia who was admitted at [facility] from 10/3/17 to 10/7/17 due to mechanical fall. As family, patient has balance issues, and had a fall unwitnessed and sustained extensive bruises of right face and periorbital area. He was found to have traumatic intraventricular hemorrhage by imaging. He was seen by neurosurgery and serial imaging of brain showed no change. He given vitamin K and Kcentra to reversed his high INR on admission and his Warfarin was help due to bleeding. He has intermittent atrial fibr RVR and his metoprolol XL was increased to 50 mg today. He was seen by PT/OT. He is awake and standing and dressed him self this morning. He has bruises of face. He denies any weakness of his extremities. Patient denies chest pain, dyspnea, abdominal pain, n&v, fever, chills, dysuria, leg pain or swelling. The rest of ROS is unremarkable. Patient is seen and examined by me with Selam Fowler NP. Please see Selam Fowler's admit noted dated 10/9/17 for details of admission, past medical history, family history, allergies, medication list, social history and other details pertinent with this admission. Hospital admission and dc summary reviewed.    Past Medical History:   Diagnosis Date     CAD (coronary artery disease)     S/P 3-vessel CABG 2012     CVA (cerebral infarction) 2013    Small, bifrontal CVA     Hyperlipidemia LDL goal < 100        Past Surgical History:    Procedure Laterality Date     CARDIAC SURGERY  4/2012    x3 CABG       Current Outpatient Prescriptions   Medication Sig     Acetaminophen (TYLENOL PO) Take 650 mg by mouth every 6 hours as needed for mild pain or fever     sennosides (SENOKOT) 8.6 MG tablet Take 1 tablet by mouth daily as needed for constipation     metoprolol (TOPROL-XL) 25 MG 24 hr tablet Take 1 tablet (25 mg) by mouth every morning     metoprolol (TOPROL-XL) 50 MG 24 hr tablet Take 1 tablet (50 mg) by mouth every evening     Atorvastatin Calcium (LIPITOR PO) Take 20 mg by mouth daily     No current facility-administered medications for this visit.        No Known Allergies    Social History     Social History     Marital status:      Spouse name: N/A     Number of children: N/A     Years of education: N/A     Occupational History     Not on file.     Social History Main Topics     Smoking status: Never Smoker     Smokeless tobacco: Not on file     Alcohol use Yes      Comment: occationally     Drug use: Not on file     Sexual activity: Not Currently     Other Topics Concern     Not on file     Social History Narrative          Information reviewed:  Medications, vital signs, orders, nursing notes, problem list, hospital information.     ROS: All 10 point review of system completed, those pertinent positive, please see H&P, the remaining ROS is negative.    /85  Pulse 65  Temp 97.7  F (36.5  C)  Resp 18  Wt 153 lb 1.6 oz (69.4 kg)  SpO2 97%  BMI 21.97 kg/m2    PHYSICAL EXAMINATION:   GENERAL:  No acute distress.  SKIN:  Dry and warm.  There is ecchymosis of right forehead and right periorbital area  HEENT:  See above.  Pupils round, reactive. Exam of conjunctiva and lids are normal. Sclera without icterus. There is no oral thrush.  NECK:  Supple. No jugular venous distension.  CHEST: No reproducible chest tenderness.   LUNGS:  Normal respiratory effort. Lungs reveal decreased breath sounds at bases. No rales or  wheezes.  HEART:  Irregular rate and rhythm.  No murmur, gallops or rubs auscultated.  ABDOMEN:  Soft, bowel sounds positive.  There is no tenderness or guarding.   EXTREMITIES: No edema.   NEUROLOGIC:  Alert and oriented x3.  There is no focal deficit appreciated.  PSYCH: Recent and remote memory is normal. Mood and affect are normal.    Lab/Diagnostic data:  Reviewed    CBC Lab Results (Last 5 results in 365 days)       WBC RBC Hgb Hct MCV MCH MCHC RDW Plt Neut # Lymph # Eos # Baso # Immat. Gran #   10/04/17 0430 6.7 4.03 13.3 37.6 93 33.0 35.4 12.9 176 -- -- -- -- --   10/03/17 0900 6.4 4.56 15.1 42.4 93 33.1 35.6 12.9 201 5.0 0.6 0.0 0.0 0.0   03/14/17 0756 3.9 3.92 12.7 36.2 92 32.4 35.1 13.0 136 -- -- -- -- --   03/12/17 0910 8.4 3.54 11.5 33.2 94 32.5 34.6 13.1 117 7.3 0.8 0.0 0.0 0.0   03/11/17 1525 12.1 4.40 14.3 41.4 94 32.5 34.5 12.8 146 11.1 0.5 0.0 0.0 0.0   CMP Labs (Last 5 results in 365 days)       Na+ K+ Cl CO2 ANION GAP Glc BUN Creat GFR GFR-Black Calcium Mg ALT AST A1C TSH LDL HIV   10/07/17 0223 -- -- -- -- -- 107 -- -- -- -- -- -- -- -- -- -- -- --   10/05/17 0753 -- 4.3 -- -- -- -- -- -- -- -- -- -- -- -- -- -- -- --   10/05/17 0753 -- -- -- -- -- -- -- -- -- -- -- 2.1 -- -- -- -- -- --   10/04/17 0430 140 4.2 106 28 6 107 20 1.04 67 81 8.2 -- -- -- -- -- -- --   10/04/17 0258 -- -- -- -- -- 105 -- -- -- -- -- -- -- -- -- -- --      Results for orders placed or performed during the hospital encounter of 10/03/17   CT Head w/o Contrast     Value    Radiologist flags Intracranial hemorrhage (AA)    Narrative    CT SCAN OF THE HEAD WITHOUT CONTRAST October 3, 2017 9:21 AM     HISTORY: Fall, on coumadin.    TECHNIQUE: Axial images of the head and coronal reformations without  IV contrast material. Radiation dose for this scan was reduced using  automated exposure control, adjustment of the mA and/or kV according  to patient size, or iterative reconstruction technique.    COMPARISON:  3/11/2017.    FINDINGS: There is a small amount of acute intraventricular hemorrhage  layering in the posterior horn of the left lateral ventricle. There is  a right supraorbital scalp hematoma. There is no evidence for any  underlying fracture. Vascular calcifications are noted. Cerebral  atrophy and scattered white matter changes are noted. There is no  evidence for parenchymal hemorrhage, acute infarct, or mass effect.      Impression    IMPRESSION:  1. Small amount of acute intraventricular hemorrhage layering in the  posterior horn of the left lateral ventricle.  2. Cerebral atrophy with chronic white matter changes.  3. Right supraorbital scalp hematoma. No evidence for fracture.    [Critical Result: Intracranial hemorrhage]    Finding was identified on 10/3/2017 9:21 AM.     Dr. Leon was contacted by me on 10/3/2017 9:24 AM and verbalized  understanding of the critical result.      MADDIE FAIR MD   CT Maxillofacial w/o Contrast    Narrative    CT MAXILLOFACIAL WITHOUT CONTRAST  10/3/2017 9:20 AM     HISTORY: Fall, facial trauma.    TECHNIQUE: Axial images were obtained through the facial bones without  contrast. Coronal and sagittal reconstructions were also acquired.  Radiation dose for this scan was reduced using automated exposure  control, adjustment of the mA and/or kV according to patient size, or  iterative reconstruction technique.    FINDINGS: The facial bones are intact. Specifically, the bony  mandible, pterygoid plates, zygomatic arches, nasal bones, and bony  orbits are intact. There is some soft tissue swelling over the right  supraorbital region. Both globes are intact. Retro-orbital structures  are normal.      Impression    IMPRESSION: Right supraorbital soft tissue swelling. No evidence for  any facial fractures.    MADDIE FAIR MD   CT Head w/o Contrast    Narrative    CT OF THE HEAD WITHOUT CONTRAST 10/4/2017 5:09 AM     COMPARISON: Head CT 10/3/2017.    HISTORY: Intraventricular  hemorrhage.    TECHNIQUE: 5 mm thick axial CT images of the head were acquired  without IV contrast material.    FINDINGS: Tiny amount of hyperdense hemorrhage layering in the  occipital horn of the left lateral ventricle is again noted without  change. There is no evidence for new or increasing intracranial  hemorrhage.     There is moderate diffuse cerebral volume loss. There are subtle  patchy areas of decreased density in the cerebral white matter  bilaterally that are consistent with sequela of chronic small vessel  ischemic disease. The ventricles and basal cisterns are within normal  limits in configuration given the degree of cerebral volume loss.   There is no midline shift. There are no extra-axial fluid collections.      No intracranial mass or recent infarct.    The visualized paranasal sinuses are well aerated. There is no  mastoiditis. There are no fractures of the visualized bones.       Impression    IMPRESSION:   1. Stable tiny hyperdense hemorrhage in the occipital horn of the left  lateral ventricle. No evidence for new or increasing intracranial  hemorrhage.  2. Diffuse cerebral volume loss and cerebral white matter changes  consistent with chronic small vessel ischemic disease.      Radiation dose for this scan was reduced using automated exposure  control, adjustment of the mA and/or kV according to patient size, or  iterative reconstruction technique.    ROSCOE AGUILAR MD         ASSESSMENT / PLAN:     Physical deconditioning  Plan: PT/OT with increase independence, self-care, strength and endurance and other cares that help return to home/facility of origin, fall precautions. Care conference with patient and family for the progress of rehab and disposition issues will be discussed as planned. Rehab evaluation and other evaluations including CPT are at rehab logs, to be reviewed separately.  Fall risk assessment as well as cognitive evaluation so far performed:  AMANDA: 19 /30  Tinetti:   /28  Bed mobility: sup  Transfers: cga  Ambulating distance: 200-300 Feet  Fww    Traumatic hemorrhage of left cerebrum without loss of consciousness, subsequent encounter  Plan: Continue PT/OT, fall precautions and warfarin is on hold.    Chronic atrial fibrillation (H)  Plan: Continue metoprolol. Warfarin is on hold.    Essential hypertension  Plan: Continue metoprolol.    Mild dementia  Plan: Fall and delirium prevention.    Other problems with same care. Primary care doctor and other specialists to address those chronic problems in next clinic appointment to be scheduled upon discharge from the TCU.      Total time spent with patient visit was 34 min including patient visit, review of past records, patients counseling and coordinating care.        Bin Leach MD

## 2017-10-12 NOTE — MR AVS SNAPSHOT
After Visit Summary   10/12/2017    Rell Erazo    MRN: [id number]           Patient Information     Date Of Birth          6/5/1928        Visit Information        Provider Department      10/12/2017 2:45 PM Bin Leach MD [facility]        Today's Diagnoses     Physical deconditioning    -  1    Traumatic hemorrhage of left cerebrum without loss of consciousness, subsequent encounter        Chronic atrial fibrillation (H)        Essential hypertension        Mild dementia           Follow-ups after your visit        Your next 10 appointments already scheduled     Oct 31, 2017 10:30 AM CDT   CT HEAD W/O CONTRAST with SHCT1   [facility] ([facility])    [address] [id number] [phone]           Please bring any scans or X-rays taken at other hospitals, if similar tests were done. Also bring a list of your medicines, including vitamins, minerals and over-the-counter drugs. It is safest to leave personal items at home.  Be sure to tell your doctor:   If you have any allergies.   If there s any chance you are pregnant.   If you are breastfeeding.   If you have any special needs.  You do not need to do anything special to prepare.  Please wear loose clothing, such as a sweat suit or jogging clothes. Avoid snaps, zippers and other metal. We may ask you to undress and put on a hospital gown.            Oct 31, 2017 11:45 AM CDT   New Visit with Janet Salazar PA-C   [facility] ([facility])    [address] [id number] [phone]              Who to contact     If you have questions or need follow up information about today's clinic visit or your schedule please contact [facility] directly at [phone].  Normal or non-critical lab and imaging results will be communicated to you by MyChart, letter or phone within 4  "business days after the clinic has received the results. If you do not hear from us within 7 days, please contact the clinic through [employer] or phone. If you have a critical or abnormal lab result, we will notify you by phone as soon as possible.  Submit refill requests through [employer] or call your pharmacy and they will forward the refill request to us. Please allow 3 business days for your refill to be completed.          Additional Information About Your Visit        [employer] Information     [employer] lets you send messages to your doctor, view your test results, renew your prescriptions, schedule appointments and more. To sign up, go to [email or web address] . Click on \"Log in\" on the left side of the screen, which will take you to the Welcome page. Then click on \"Sign up Now\" on the right side of the page.     You will be asked to enter the access code listed below, as well as some personal information. Please follow the directions to create your username and password.     Your access code is: [email or web address]  Expires: 2018 12:44 PM     Your access code will  in 90 days. If you need help or a new code, please call your [facility] clinic or [phone].        Care EveryWhere ID     This is your Care EveryWhere ID. This could be used by other organizations to access your [facility] medical records  [id number]        Your Vitals Were     Pulse Temperature Respirations Pulse Oximetry BMI (Body Mass Index)       65 97.7  F (36.5  C) 18 97% 21.97 kg/m2        Blood Pressure from Last 3 Encounters:   10/11/17 140/85   10/09/17 120/65   10/07/17 120/70    Weight from Last 3 Encounters:   10/11/17 153 lb 1.6 oz (69.4 kg)   10/09/17 153 lb 1.6 oz (69.4 kg)   10/04/17 130 lb 15.3 oz (59.4 kg)              Today, you had the following     No orders found for display       Primary Care Provider    Physician No Ref-Primary       NO REF-PRIMARY PHYSICIAN        Equal Access to Services     CLAUDIA MORALES : Hadii aad ku " sharifa Arriaza, stone dawsonrahul, qadarrick karuddy roberts, quinn delgado abbiearmond wilmerleonidas laharmeetalbertina mary. So [facility] [phone].    ATENCIÓN: Si habla genie, tiene a trevizo disposición servicios gratuitos de asistencia lingüística. Teressa al [phone].    We comply with applicable federal civil rights laws and [place] laws. We do not discriminate on the basis of race, color, national origin, age, disability, sex, sexual orientation, or gender identity.            Thank you!     Thank you for choosing [facility]  for your care. Our goal is always to provide you with excellent care. Hearing back from our patients is one way we can continue to improve our services. Please take a few minutes to complete the written survey that you may receive in the mail after your visit with us. Thank you!             Your Updated Medication List - Protect others around you: Learn how to safely use, store and throw away your medicines at www.disposemymeds.org.          This list is accurate as of: 10/12/17 11:31 AM.  Always use your most recent med list.                   Brand Name Dispense Instructions for use Diagnosis    LIPITOR PO      Take 20 mg by mouth daily        * metoprolol 25 MG 24 hr tablet    TOPROL-XL    30 tablet    Take 1 tablet (25 mg) by mouth every morning    Chronic atrial fibrillation (H)       * metoprolol 50 MG 24 hr tablet    TOPROL-XL    30 tablet    Take 1 tablet (50 mg) by mouth every evening    Chronic atrial fibrillation (H)       sennosides 8.6 MG tablet    SENOKOT     Take 1 tablet by mouth daily as needed for constipation        TYLENOL PO      Take 650 mg by mouth every 6 hours as needed for mild pain or fever        * Notice:  This list has 2 medication(s) that are the same as other medications prescribed for you. Read the directions carefully, and ask your doctor or other care provider to review them with you.

## 2017-10-17 ENCOUNTER — NURSING HOME VISIT (OUTPATIENT)
Dept: GERIATRICS | Facility: CLINIC | Age: 82
End: 2017-10-17
Payer: MEDICARE

## 2017-10-17 VITALS
TEMPERATURE: 97.3 F | BODY MASS INDEX: 21.69 KG/M2 | HEART RATE: 62 BPM | RESPIRATION RATE: 18 BRPM | OXYGEN SATURATION: 95 % | SYSTOLIC BLOOD PRESSURE: 116 MMHG | WEIGHT: 151.2 LBS | DIASTOLIC BLOOD PRESSURE: 64 MMHG

## 2017-10-17 DIAGNOSIS — I62.9 INTRACRANIAL HEMORRHAGE (H): ICD-10-CM

## 2017-10-17 DIAGNOSIS — I48.20 CHRONIC ATRIAL FIBRILLATION (H): ICD-10-CM

## 2017-10-17 DIAGNOSIS — Z78.9 ANTICOAGULATION THERAPY NOT INDICATED: ICD-10-CM

## 2017-10-17 DIAGNOSIS — T07.XXXA MULTIPLE BRUISES: ICD-10-CM
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
## 2017-10-17 DIAGNOSIS — I25.119 CORONARY ARTERY DISEASE INVOLVING NATIVE HEART WITH ANGINA PECTORIS, UNSPECIFIED VESSEL OR LESION TYPE (H): ICD-10-CM

## 2017-10-17 DIAGNOSIS — F03.A0 MILD DEMENTIA (H): ICD-10-CM

## 2017-10-17 DIAGNOSIS — R53.81 PHYSICAL DECONDITIONING: ICD-10-CM

## 2017-10-17 PROCEDURE — 99309 SBSQ NF CARE MODERATE MDM 30: CPT | Performed by: NURSE PRACTITIONER

## 2017-10-17 NOTE — PROGRESS NOTES
"[place] [facility]    Chief Complaint   Patient presents with     RECHECK       HPI:    Rell Erazo is a 89 year old  (6/5/1928), who is being seen today for an episodic care visit at [facility].  HPI information obtained from: facility chart records.Today's concern is: \"I did not fall, I want to go home\".  Up walking by self without assist though is still deemed not safe.     Fall, subsequent encounter  Intracranial hemorrhage (H)  Multiple bruises  Physical deconditioning  Chronic atrial fibrillation (H)  Anticoagulation therapy not indicated  Mild dementia: SLUMS 20/30 and Safety Eval is 7.5/27    No CP, SOB, Cough, dizziness, fevers, chills, HA, N/V, dysuria or Bowel Abnormalities. Appetite is good.  No pain.      ALLERGIES: Review of patient's allergies indicates no known allergies.  Past Medical, Surgical, Family and Social History reviewed and updated in [facility].    Current Outpatient Prescriptions   Medication Sig Dispense Refill     Acetaminophen (TYLENOL PO) Take 650 mg by mouth every 6 hours as needed for mild pain or fever       sennosides (SENOKOT) 8.6 MG tablet Take 1 tablet by mouth daily as needed for constipation       metoprolol (TOPROL-XL) 25 MG 24 hr tablet Take 1 tablet (25 mg) by mouth every morning 30 tablet 1     metoprolol (TOPROL-XL) 50 MG 24 hr tablet Take 1 tablet (50 mg) by mouth every evening 30 tablet      Atorvastatin Calcium (LIPITOR PO) Take 20 mg by mouth daily       Medications reviewed:  Medications reconciled to facility chart and changes were made to reflect current medications as identified as above med list. Below are the changes that were made:   Medications stopped since last EPIC medication reconciliation:   There are no discontinued medications.    Medications started since last [facility] medication reconciliation:  No orders of the defined types were placed in this encounter.         REVIEW OF SYSTEMS:  See under HPI above    Physical " Exam:  /64  Pulse 62  Temp 97.3  F (36.3  C)  Resp 18  Wt 151 lb 3.2 oz (68.6 kg)  SpO2 95%  BMI 21.69 kg/m2  GENERAL APPEARANCE:  Alert, in no distress, oriented, cooperative, vague, a bit frustrated with being here  ENT:  Mouth with moist mucous membranes, normal hearing acuity   RESP:  respiratory effort   of chest normal, lungs clear to auscultation , no respiratory distress  CV: Auscultation of heart done , RRR-IRRR, no murmur, rub, or gallop, no edema, +2 pedal pulses  ABDOMEN:  normal bowel sounds, soft, nontender, no hepatosplenomegaly or other masses  M/S:   FREIRE equally  SKIN:  Inspection of skin: right temporal and face areas with bruising is fading  NEURO:   Cranial nerves 2-12 are normal tested and grossly at patient's baseline  PSYCH:  oriented X 3, insight and judgement impaired, memory impaired , affect and mood normal      Recent Labs:   CBC RESULTS:   Recent Labs   Lab Test  10/04/17   0430  10/03/17   0900   WBC  6.7  6.4   RBC  4.03*  4.56   HGB  13.3  15.1   HCT  37.6*  42.4   MCV  93  93   MCH  33.0  33.1*   MCHC  35.4  35.6   RDW  12.9  12.9   PLT  176  201       Last Basic Metabolic Panel:  Recent Labs   Lab Test  10/05/17   0753  10/04/17   0430  10/03/17   0900   NA   --   140  139   POTASSIUM  4.3  4.2  4.3   CHLORIDE   --   106  105   MING   --   8.2*  8.7   CO2   --   28  27   BUN   --   20  18   CR   --   1.04  1.07   GLC   --   107*  99       Liver Function Studies -   Recent Labs   Lab Test  10/03/17   0900  03/11/17   1525   PROTTOTAL  7.7  7.7   ALBUMIN  3.9  4.0   BILITOTAL  0.8  1.2   ALKPHOS  103  78   AST  51*  47*   ALT  39  30       ASSESSMENT / PLAN:  (W19.XXXA) Fall  (primary encounter diagnosis)   (I62.9) Intracranial hemorrhage (H)  (T07.XXXA) Multiple bruises  (R53.81) Physical deconditioning  Comment/Plan : off coumadin--see below, PT and OT--goal is to go home.     (I48.2) Chronic atrial fibrillation  (Z78.9) Anticoagulation therapy not  indicated  Comment/Plan: off coumadin at this time, Alvin to review with Cards as to when and if to resume.    (I25.10) CAD (coronary artery disease)  Comment/Plan: cont BB, monitor.     (F03.90) Mild dementia  Comment/Plan: appear more than mild based on Cog scores so far and behavior since here(see rehab notes).   May need more help or watching at home for safety.  He should not drive unless clearly ok with [facility]      Electronically signed by  ADRYAN Castellanos CNP

## 2017-10-24 ENCOUNTER — NURSING HOME VISIT (OUTPATIENT)
Dept: GERIATRICS | Facility: CLINIC | Age: 82
End: 2017-10-24
Payer: MEDICARE

## 2017-10-24 VITALS
OXYGEN SATURATION: 97 % | HEART RATE: 63 BPM | DIASTOLIC BLOOD PRESSURE: 65 MMHG | WEIGHT: 156.2 LBS | BODY MASS INDEX: 22.41 KG/M2 | TEMPERATURE: 97.8 F | SYSTOLIC BLOOD PRESSURE: 116 MMHG | RESPIRATION RATE: 20 BRPM

## 2017-10-24 DIAGNOSIS — F03.A0 MILD DEMENTIA (H): ICD-10-CM

## 2017-10-24 DIAGNOSIS — I48.20 CHRONIC ATRIAL FIBRILLATION (H): ICD-10-CM

## 2017-10-24 DIAGNOSIS — R53.81 PHYSICAL DECONDITIONING: ICD-10-CM

## 2017-10-24 DIAGNOSIS — I62.9 INTRACRANIAL HEMORRHAGE (H): ICD-10-CM

## 2017-10-24 DIAGNOSIS — T07.XXXA MULTIPLE BRUISES: ICD-10-CM
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
## 2017-10-24 DIAGNOSIS — Z78.9 ANTICOAGULATION THERAPY NOT INDICATED: ICD-10-CM

## 2017-10-24 PROCEDURE — 99308 SBSQ NF CARE LOW MDM 20: CPT | Performed by: NURSE PRACTITIONER

## 2017-10-24 NOTE — PROGRESS NOTES
"[place] [facility]    Chief Complaint   Patient presents with     RECHECK       HPI:    Rell Erazo is a 89 year old  (6/5/1928), who is being seen today for an episodic care visit at [facility].  HPI information obtained from: facility chart records.Today's concern is: \"I want to go home and have a conference Wednesday\".         Fall, subsequent encounter  Intracranial hemorrhage (H)  Multiple bruises  Physical deconditioning  Chronic atrial fibrillation (H)  Anticoagulation therapy not indicated  Mild dementia: SLUMS 20/30 and Safety Eval is 7.5/27    No CP, SOB, Cough, dizziness, fevers, chills, HA, N/V, dysuria or Bowel Abnormalities. Appetite is good.  No pain.      ALLERGIES: Review of patient's allergies indicates no known allergies.  Past Medical, Surgical, Family and Social History reviewed and updated in [facility].    Current Outpatient Prescriptions   Medication Sig Dispense Refill     Acetaminophen (TYLENOL PO) Take 650 mg by mouth every 6 hours as needed for mild pain or fever       sennosides (SENOKOT) 8.6 MG tablet Take 1 tablet by mouth daily as needed for constipation       metoprolol (TOPROL-XL) 25 MG 24 hr tablet Take 1 tablet (25 mg) by mouth every morning 30 tablet 1     metoprolol (TOPROL-XL) 50 MG 24 hr tablet Take 1 tablet (50 mg) by mouth every evening 30 tablet      Atorvastatin Calcium (LIPITOR PO) Take 20 mg by mouth daily       Medications reviewed:  Medications reconciled to facility chart and changes were made to reflect current medications as identified as above med list. Below are the changes that were made:   Medications stopped since last EPIC medication reconciliation:   There are no discontinued medications.    Medications started since last [facility] medication reconciliation:  No orders of the defined types were placed in this encounter.         REVIEW OF SYSTEMS:  See under HPI above    Physical Exam:  /65  Pulse 63  Temp 97.8  F (36.6  C)  " Resp 20  Wt 156 lb 3.2 oz (70.9 kg)  SpO2 97%  BMI 22.41 kg/m2  GENERAL APPEARANCE:  Alert, in no distress, oriented, cooperative, calm  ENT:  Mouth with moist mucous membranes, normal hearing acuity   RESP:  respiratory effort   of chest normal, lungs clear to auscultation , no respiratory distress  CV: Auscultation of heart done , RRR-IRRR, no murmur, rub, or gallop, no edema, +2 pedal pulses  ABDOMEN:  normal bowel sounds, soft, nontender,.  M/S:   FREIRE equally  SKIN:  Inspection of skin: right temporal and face areas with bruising nearly gone  NEURO:   Cranial nerves 2-12 are normal tested and grossly at patient's baseline  PSYCH:  oriented X 3, insight and judgement impaired, memory impaired , affect and mood normal      Recent Labs:   CBC RESULTS:   Recent Labs   Lab Test  10/04/17   0430  10/03/17   0900   WBC  6.7  6.4   RBC  4.03*  4.56   HGB  13.3  15.1   HCT  37.6*  42.4   MCV  93  93   MCH  33.0  33.1*   MCHC  35.4  35.6   RDW  12.9  12.9   PLT  176  201       Last Basic Metabolic Panel:  Recent Labs   Lab Test  10/05/17   0753  10/04/17   0430  10/03/17   0900   NA   --   140  139   POTASSIUM  4.3  4.2  4.3   CHLORIDE   --   106  105   MING   --   8.2*  8.7   CO2   --   28  27   BUN   --   20  18   CR   --   1.04  1.07   GLC   --   107*  99       Liver Function Studies -   Recent Labs   Lab Test  10/03/17   0900  03/11/17   1525   PROTTOTAL  7.7  7.7   ALBUMIN  3.9  4.0   BILITOTAL  0.8  1.2   ALKPHOS  103  78   AST  51*  47*   ALT  39  30       ASSESSMENT / PLAN:  (W19.XXXA) Fall  (primary encounter diagnosis)   (I62.9) Intracranial hemorrhage (H)  (T07.XXXA) Multiple bruises  (R53.81) Physical deconditioning  Comment/Plan : off coumadin--see below, PT and OT, no LCD yet    (I48.2) Chronic atrial fibrillation  (Z78.9) Anticoagulation therapy not indicated  Comment/Plan: off coumadin at this time, Alvin to review with Cards as to when and if to resume as outpt.      (F03.90) Mild dementia  Comment/Plan:  needs at least daily checks, NO DRIVING  unless clearly ok with PCP and may need a  [facility] eval.        Electronically signed by  ADRYAN Castellanos CNP

## 2017-10-26 ENCOUNTER — DISCHARGE SUMMARY NURSING HOME (OUTPATIENT)
Dept: GERIATRICS | Facility: CLINIC | Age: 82
End: 2017-10-26
Payer: MEDICARE

## 2017-10-26 DIAGNOSIS — Z78.9 ANTICOAGULATION THERAPY NOT INDICATED: ICD-10-CM

## 2017-10-26 DIAGNOSIS — F03.A0 MILD DEMENTIA (H): ICD-10-CM

## 2017-10-26 DIAGNOSIS — R53.81 PHYSICAL DECONDITIONING: ICD-10-CM

## 2017-10-26 DIAGNOSIS — S06.300S: Primary | ICD-10-CM

## 2017-10-26 DIAGNOSIS — I48.20 CHRONIC ATRIAL FIBRILLATION (H): ICD-10-CM

## 2017-10-26 DIAGNOSIS — I25.119 CORONARY ARTERY DISEASE INVOLVING NATIVE HEART WITH ANGINA PECTORIS, UNSPECIFIED VESSEL OR LESION TYPE (H): ICD-10-CM

## 2017-10-26 PROCEDURE — 99316 NF DSCHRG MGMT 30 MIN+: CPT | Performed by: NURSE PRACTITIONER

## 2017-10-26 NOTE — PROGRESS NOTES
[place] [facility] DISCHARGE SUMMARY    PATIENT'S NAME: Rell Erazo  YOB: 1928  MEDICAL RECORD NUMBER:  [id number]    PRIMARY CARE PROVIDER AND CLINIC RESPONSIBLE AFTER TRANSFER: Neurosurgery [facility].  Per review of [facility] has seen Dr. Bossman Álvarez with [facility].    CODE STATUS/ADVANCE DIRECTIVES DISCUSSION:   CPR/Full code      No Known Allergies    TRANSFERRING PROVIDERS: ADRYAN Deolng CNP, Dr. Hany MD  DATE OF SNF ADMISSION:  October / 07 / 2017  DATE OF SNF (anticipated) DISCHARGE: October / 28 / 2017  DISCHARGE DISPOSITION: Non-FMG Provider   Nursing Facility: [facility] Hospital stay 10/3/17 to 10/7/17.     Condition on Discharge:  Stable.  Function:  Tranfers 200 ft no AD, cga/sba, sup 4ww  Cognitive Scores: SLUMS 19/30    Equipment: walker    DISCHARGE DIAGNOSIS:   1. Traumatic intracranial hemorrhage without loss of consciousness, sequela (H)    2. Chronic atrial fibrillation (H)    3. Anticoagulation therapy not indicated    4. Coronary artery disease involving native heart with angina pectoris, unspecified vessel or lesion type (H)    5. Physical deconditioning    6. Mild dementia        HPI Nursing Facility Course:  HPI information obtained from: facility chart records, facility staff and [facility] chart review.  Mr. Erazo has progressed to being able to walk independently without an assistive device.  He claims the fall happened because he started to bleed in his head.  Since it was unwitnessed we do not know the sequence of events.  He continues off the Coumadin and ASA. Metoprolol regulates heart rate.  He has mild dementia and does have family to help him at home.  He denies any issues, and is more than ready to go home.    Hospital Course:  Mr. Erazo is an 89-year-old male with history of coronary artery disease, atrial fibrillation on chronic Coumadin,  cerebrovascular accident, hyperlipidemia who presented to the emergency room with suspected fall and was found to have intracranial hemorrhage.       Suspected mechanical fall resulting in traumatic intracranial hemorrhage.    -There were no witnesses however, the patient does have significant periorbital bruising.   - It is suspected that the patient sustained a fall and had a traumatic intraventricular hemorrhage.  Likely mechanical as per the daughter he has been having balance issues lately.  -The patient had no recollection of this event.    -Repeat head CT- stable  -Neurosurgery was consulted, nonsurgical bleed, eventually signed off after stability  -physical therapy and occupationalt therapy evaluated the patient and recommended TCU.       History of atrial fibrillation on chronic anticoagulation.    -INR on presentation 2.6; reversed with vitamin K and Kcentra  -Continue to hold Coumadin, per NS  -Continue to hold Coumadin and all blood thinners due to patient's risk of recurring falls; if must restart wait at least 2 weeks; discussed with the daughter on the day of discharge, will hold Coumadin for at least two weeks, she is going to discuss with his primary cardiologist about resumption and the timing.  -Intermittent RVR including last night requiring IV metoprolol  -Increase PTA metoprolol XL to 50 mg at night and 25 mg in the morning      Coronary artery disease history.    -Continue prior to admission simvastatin and metoprolol       Multiple bruises.    -periorbital bruising stable, also has a right subconjunctival hge  -Stable bruises on bilateral elbows and back.    -PT/OT eval      Mild dementia:  -Reportedly patient does have mild dementia, patient was confused and agitated intermittently during the course of hospital stay  -Appears to be mostly calm by the day of discharge       PAST MEDICAL HISTORY:  has a past medical history of CAD (coronary artery disease); CVA (cerebral infarction) (2013);  "and Hyperlipidemia LDL goal < 100.    DISCHARGE MEDICATIONS:  Current Outpatient Prescriptions   Medication Sig Dispense Refill     Acetaminophen (TYLENOL PO) Take 650 mg by mouth every 6 hours as needed for mild pain or fever       sennosides (SENOKOT) 8.6 MG tablet Take 1 tablet by mouth daily as needed for constipation       metoprolol (TOPROL-XL) 25 MG 24 hr tablet Take 1 tablet (25 mg) by mouth every morning 30 tablet 1     metoprolol (TOPROL-XL) 50 MG 24 hr tablet Take 1 tablet (50 mg) by mouth every evening 30 tablet      Atorvastatin Calcium (LIPITOR PO) Take 20 mg by mouth daily         MEDICATION CHANGES/RATIONALE:   none  Controlled medications sent with patient: not applicable/none     ROS:    4 point ROS including Respiratory, CV, GI and , other than that noted in the HPI,  Is unreliable due to dementia.    Physical Exam:   Vitals: /65  Pulse 74  Temp 97.7  F (36.5  C)  Resp 18  Ht 5' 10\" (1.778 m)  Wt 156 lb 3.2 oz (70.9 kg)  SpO2 97%  BMI 22.41 kg/m2  BMI= Body mass index is 22.41 kg/(m^2).    GENERAL APPEARANCE:  Alert, in no distress, tooling about in his room with a wheelchair, because it is fun  RESP:  respiratory effort and palpation of chest normal, lungs clear to auscultation , no respiratory distress  CV:  Palpation and auscultation of heart done , irregular rhythm and rate, no murmur  NEURO:   Examination of sensation by touch normal, moves all extremeties  PSYCH:  insight and judgement impaired, memory impaired     DISCHARGE PLAN:  Occupational Therapy, Physical Therapy, Home Health Aide and From:  [facility]  Patient instructed to follow-up with:  PCP in 7 days      Current [facility] scheduled appointments:  Future Appointments  Date Time Provider Department Center   10/31/2017 10:30 AM [address]   10/31/2017 11:45 AM [facility] Sal[facility] and [facility] by this provider: No    Pending labs: none  SNF labs   CBC " RESULTS:   Recent Labs   Lab Test  10/04/17   0430  10/03/17   0900   WBC  6.7  6.4   RBC  4.03*  4.56   HGB  13.3  15.1   HCT  37.6*  42.4   MCV  93  93   MCH  33.0  33.1*   MCHC  35.4  35.6   RDW  12.9  12.9   PLT  176  201       Last Basic Metabolic Panel:  Recent Labs   Lab Test  10/05/17   0753  10/04/17   0430  10/03/17   0900   NA   --   140  139   POTASSIUM  4.3  4.2  4.3   CHLORIDE   --   106  105   MING   --   8.2*  8.7   CO2   --   28  27   BUN   --   20  18   CR   --   1.04  1.07   GLC   --   107*  99       Liver Function Studies -   Recent Labs   Lab Test  10/03/17   0900  03/11/17   1525   PROTTOTAL  7.7  7.7   ALBUMIN  3.9  4.0   BILITOTAL  0.8  1.2   ALKPHOS  103  78   AST  51*  47*   ALT  39  30       Discharge Treatments:none    TOTAL DISCHARGE TIME:   Greater than 30 minutes  Electronically signed by:  ADRYAN Delong CNP         Documentation of Face-to-Face and Certification for Home Health Services     Patient: Rell Erazo   YOB: 1928  MR Number: [id number]  Today's Date: 10/27/2017    I certify that patient: Rell Erazo is under my care and that I, or a nurse practitioner or physician's assistant working with me, had a face-to-face encounter that meets the physician face-to-face encounter requirements with this patient on: 10/26/2017.    This encounter with the patient was in whole, or in part, for the following medical condition, which is the primary reason for home health care:    Traumatic intracranial hemorrhage without loss of consciousness, sequela (H)  Chronic atrial fibrillation (H)  Anticoagulation therapy not indicated  Coronary artery disease involving native heart with angina pectoris, unspecified vessel or lesion type (H)  Physical deconditioning  Mild dementia  .    I certify that, based on my findings, the following services are medically necessary home health services: Occupational Therapy, Physical Therapy and home health aid.    My clinical findings  support the need for the above services because: Occupational Therapy Services are needed to assess and treat cognitive ability and address ADL safety due to impairment in sequencing and problem solving. and Physical Therapy Services are needed to assess and treat the following functional impairments: generalized physical deconditioning, balance, and gait.    Further, I certify that my clinical findings support that this patient is homebound (i.e. absences from home require considerable and taxing effort and are for medical reasons or [facility] services or infrequently or of short duration when for other reasons) because: Mental health symptoms including: Other: cognitively unable to drive his car anymore. and Patient gets lost or wanders to due to cognitive impairments...    Based on the above findings. I certify that this patient is confined to the home and needs intermittent skilled nursing care, physical therapy and/or speech therapy.  The patient is under my care, and I have initiated the establishment of the plan of care.  This patient will be followed by a physician who will periodically review the plan of care.  Physician/Provider to provide follow up care: Family setting up appointment    Responsible Medicare certified [facility] Physician: Miles Franco  Physician Signature: See electronic signature associated with these discharge orders.  Date: 10/26/2017

## 2017-10-27 VITALS
DIASTOLIC BLOOD PRESSURE: 65 MMHG | HEIGHT: 70 IN | TEMPERATURE: 97.7 F | RESPIRATION RATE: 18 BRPM | SYSTOLIC BLOOD PRESSURE: 131 MMHG | WEIGHT: 156.2 LBS | HEART RATE: 74 BPM | BODY MASS INDEX: 22.36 KG/M2 | OXYGEN SATURATION: 97 %

## 2018-09-06 ENCOUNTER — HOSPITAL ENCOUNTER (EMERGENCY)
Facility: CLINIC | Age: 83
Discharge: HOME | End: 2018-09-07
Attending: EMERGENCY MEDICINE | Admitting: EMERGENCY MEDICINE
Payer: MEDICARE

## 2018-09-06 VITALS
TEMPERATURE: 98.7 F | HEIGHT: 70 IN | RESPIRATION RATE: 16 BRPM | BODY MASS INDEX: 22.9 KG/M2 | OXYGEN SATURATION: 98 % | DIASTOLIC BLOOD PRESSURE: 88 MMHG | SYSTOLIC BLOOD PRESSURE: 175 MMHG | WEIGHT: 160 LBS
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
## 2018-09-06 DIAGNOSIS — S01.01XA SCALP LACERATION, INITIAL ENCOUNTER: ICD-10-CM

## 2018-09-06 DIAGNOSIS — R03.0 ELEVATED BLOOD PRESSURE READING WITHOUT DIAGNOSIS OF HYPERTENSION: ICD-10-CM

## 2018-09-06 PROCEDURE — 99283 EMERGENCY DEPT VISIT LOW MDM: CPT

## 2018-09-06 PROCEDURE — 12002 RPR S/N/AX/GEN/TRNK2.6-7.5CM: CPT

## 2018-09-06 ASSESSMENT — ENCOUNTER SYMPTOMS
FEVER: 0
WOUND: 1
NECK PAIN: 0
HEADACHES: 0

## 2018-09-06 NOTE — ED AVS SNAPSHOT
Emergency Department    [address] [facility] [address]    Phone:  [phone]    Fax:  [phone]                                       Rell Erazo   MRN: [id number]    Department:   Emergency Department   Date of Visit:  9/6/2018           Patient Information     Date Of Birth          6/5/1928        Your diagnoses for this visit were:     Fall, initial encounter     Scalp laceration, initial encounter     Elevated blood pressure reading without diagnosis of hypertension        You were seen by Reanna Sandhu MD.      Follow-up Information     Follow up with [facility]. Schedule an appointment as soon as possible for a visit in 11 days.    Why:  For suture removal    Contact information:    [address] [facility] [address] [phone]          Discharge Instructions       Staples out a week from Monday, keep them clean, recheck sooner if any signs of infection.  If you develop a severe headache and nausea or worsening confusion, return to the ER.  Get your blood pressure checked when you are relaxed 2 or 3 times before your appointment and bring those numbers in so your doctor can see them.        Discharge References/Attachments     FALLS, PREVENTING, STAYING ACTIVE (ENGLISH)    LACERATION, ALL (ENGLISH)      24 Hour Appointment Hotline       To make an appointment at any [facility], call [phone] ([phone]). If you don't have a family doctor or clinic, we will help you find one. [facility] clinics are conveniently located to serve the needs of you and your family.             Review of your medicines      Notice     You have not been prescribed any medications.            Orders Needing Specimen Collection     None      Pending Results     No orders found from 9/4/2018 to 9/7/2018.            Pending Culture Results     No orders found from 9/4/2018 to 9/7/2018.            Pending Results Instructions     If you had any lab results  that were not finalized at the time of your Discharge, you can call the ED Lab Result RN at [phone]. You will be contacted by this team for any positive Lab results or changes in treatment. The nurses are available 7 days a week from 10A to 6:30P.  You can leave a message 24 hours per day and they will return your call.        Test Results From Your Hospital Stay               Clinical Quality Measure: Blood Pressure Screening     Your blood pressure was checked while you were in the emergency department today. The last reading we obtained was  BP: 175/88 . Please read the guidelines below about what these numbers mean and what you should do about them.  If your systolic blood pressure (the top number) is less than 120 and your diastolic blood pressure (the bottom number) is less than 80, then your blood pressure is normal. There is nothing more that you need to do about it.  If your systolic blood pressure (the top number) is 120-139 or your diastolic blood pressure (the bottom number) is 80-89, your blood pressure may be higher than it should be. You should have your blood pressure rechecked within a year by a primary care provider.  If your systolic blood pressure (the top number) is 140 or greater or your diastolic blood pressure (the bottom number) is 90 or greater, you may have high blood pressure. High blood pressure is treatable, but if left untreated over time it can put you at risk for heart attack, stroke, or kidney failure. You should have your blood pressure rechecked by a primary care provider within the next 4 weeks.  If your provider in the emergency department today gave you specific instructions to follow-up with your doctor or provider even sooner than that, you should follow that instruction and not wait for up to 4 weeks for your follow-up visit.        Thank you for choosing [facility]       Thank you for choosing [facility] for your care. Our goal is always to provide you with excellent care.  "Hearing back from our patients is one way we can continue to improve our services. Please take a few minutes to complete the written survey that you may receive in the mail after you visit with us. Thank you!        [employer] Information     [employer] lets you send messages to your doctor, view your test results, renew your prescriptions, schedule appointments and more. To sign up, go to [email or web address] . Click on \"Log in\" on the left side of the screen, which will take you to the Welcome page. Then click on \"Sign up Now\" on the right side of the page.     You will be asked to enter the access code listed below, as well as some personal information. Please follow the directions to create your username and password.     Your access code is: [email or web address]  Expires: 2018 11:29 PM     Your access code will  in 90 days. If you need help or a new code, please call your [place] clinic or [phone].        Care EveryWhere ID     This is your Care EveryWhere ID. This could be used by other organizations to access your [place] medical records  [id number]        Equal Access to Services     CLAUDIA MORALES : Hadkeshia Arriaza, stone kramer, keny roberts, quinn harrell . So [facility] [phone].    ATENCIÓN: Si habla español, tiene a trevizo disposición servicios gratuitos de asistencia lingüística. Teressa al [phone].    We comply with applicable federal civil rights laws and [place] laws. We do not discriminate on the basis of race, color, national origin, age, disability, sex, sexual orientation, or gender identity.            After Visit Summary       This is your record. Keep this with you and show to your community pharmacist(s) and doctor(s) at your next visit.                  "

## 2018-09-06 NOTE — ED AVS SNAPSHOT
Emergency Department    [address] [place] [id number]    Phone:  [phone]    Fax:  [phone]                                       Rell Erazo   MRN: [id number]    Department:   Emergency Department   Date of Visit:  9/6/2018           After Visit Summary Signature Page     I have received my discharge instructions, and my questions have been answered. I have discussed any challenges I see with this plan with the nurse or doctor.    ..........................................................................................................................................  Patient/Patient Representative Signature      ..........................................................................................................................................  Patient Representative Print Name and Relationship to Patient    ..................................................               ................................................  Date                                            Time    ..........................................................................................................................................  Reviewed by Signature/Title    ...................................................              ..............................................  Date                                                            Time          22EPIC Rev 08/18

## 2018-09-07 NOTE — DISCHARGE INSTRUCTIONS
Staples out a week from Monday, keep them clean, recheck sooner if any signs of infection.  If you develop a severe headache and nausea or worsening confusion, return to the ER.  Get your blood pressure checked when you are relaxed 2 or 3 times before your appointment and bring those numbers in so your doctor can see them.

## 2018-09-07 NOTE — ED PROVIDER NOTES
"  History     Chief Complaint:  Fall    HPI   Rell Erazo is a non-anticoagulated 89 year old male with reported slight dementia who presents to the emergency department today for evaluation of a head injury and laceration. [place]. He took a step back and fell over the edge of the curb, landing first on his butt and then hit the back of his head causing a laceration. He denies any neck pain, and only has minor pain to the back of his head.     Allergies:  No Known Drug Allergies    Medications:    Acetaminophen (TYLENOL PO)  Atorvastatin Calcium (LIPITOR PO)  metoprolol (TOPROL-XL) 25 MG 24 hr tablet  metoprolol (TOPROL-XL) 50 MG 24 hr tablet  sennosides (SENOKOT) 8.6 MG tablet    Past Medical History:    CAD   CVA    Past Surgical History:    x3 CABG    Family History:    History reviewed. No pertinent family history.    Social History:  The patient was accompanied to the ED by EMS and his daughters.  Smoking Status: Never Smoker  Smokeless Tobacco: Never Used  Alcohol Use: Positive   Marital Status:       Review of Systems   Constitutional: Negative for fever.   Musculoskeletal: Negative for neck pain.   Skin: Positive for wound (posterior scalp).   Neurological: Negative for headaches.   All other systems reviewed and are negative.    Physical Exam     Patient Vitals for the past 24 hrs:   BP Temp Temp src Heart Rate Resp SpO2 Height Weight   09/06/18 2016 175/88 98.7  F (37.1  C) Oral 74 16 98 % 1.778 m (5' 10\") 72.6 kg (160 lb)      Physical Exam  Nursing note and vitals reviewed.    Constitutional:  Appears well-developed and well-nourished, comfortable.    HENT:     Nose normal.  No discharge.  Laceration on his upper occipital scalp, stellate-shaped, total length is about 4.4 cm.     Oropharynx is clear and moist.  Eyes:    Conjunctivae are normal without injection. No lid droop.     Pupils are equal, round, and reactive to light.      Right eye " exhibits no discharge. Left eye exhibits no discharge.      No scleral icterus.  Cardiovascular:  Normal rate, regular rhythm with normal S1 and S2.      Normal heart sounds and peripheral pulses 2+ and equal.       No murmur or mic.  Pulmonary:  Effort normal and breath sounds clear to auscultation bilaterally        No respiratory distress.  No stridor.     No wheezes. No rales.     No chest wall tenderness.  GI:    Soft. No distension and no mass. No tenderness.      No rebound and no guarding. No flank pain.  No HSM.  Musculoskeletal:  Normal range of motion. No extremity deformity.     No edema and no tenderness.  No cyanosis.                                      Neck supple, no midline spinal tenderness. No hip tenderness.   Neurological:   Alert and oriented. No cranial nerve deficit, no facial droop.  No focal weakness.     Exhibits good muscle tone. Coordination normal.  Gait normal with a walker.     GCS eye subscore is 4. GCS verbal subscore is 5.      GCS motor subscore is 6.   Skin:    Skin is warm and dry. No rash noted. No diaphoresis.      No erythema. No pallor.  No lesions.  Psychiatric:   Behavior is normal. Appropriate mood and affect.     Judgment and thought content normal.     Emergency Department Course     Procedures:     Laceration Repair      LACERATION:  A simple clean 4.4 cm stellate laceration.    LOCATION:  Posterior scalp.    ANESTHESIA:  LET - Topical.    PREPARATION:  Irrigation with Normal Saline.    DEBRIDEMENT:  wound explored, no foreign body found.    CLOSURE:  Wound was closed with 5 Staples.    Emergency Department Course:    Nursing notes and vitals reviewed.    2010 I performed an exam of the patient as documented above.     2330 I personally reviewed the results with the patient and answered all related questions prior to discharge.    Impression & Plan      Medical Decision Making:  Rell Erazo is a 89 year old male who presents to the emergency department today  for evaluation of scalp laceration after a fall.  He tripped over a curb.  He denies any neck pain, no neurologic symptoms no headache.  He is not on blood thinners.  His daughter was with him and said he landed on his bottom first and then slowly went back and hit his head.  She said it was not a hard hit to the ground.  He has been acting normally.  The wound was stellate in shape and stapled.  He got up and ambulated without difficulty.  He was observed in the ER for a period of time and developed no headache or other symptoms.  I did not feel that he needs a CT but if he develops a severe headache he is to return to the ER.  His blood pressures were elevated in the ER and I encouraged the daughter to have a few blood pressures checked as an outpatient before he goes back in for recheck with his doctor.  No treatment is indicated at this time.    Diagnosis:    ICD-10-CM    1. Fall, initial encounter W19.XXXA    2. Scalp laceration, initial encounter S01.01XA    3. Elevated blood pressure reading without diagnosis of hypertension R03.0      Disposition:   Discharge     Staples out a week from Monday, keep them clean, recheck sooner if any signs of infection.  If you develop a severe headache and nausea or worsening confusion, return to the ER.  Get your blood pressure checked when you are relaxed 2 or 3 times before your appointment and bring those numbers in so your doctor can see them.    Scribe Disclosure:  I, Mark Haroon, am serving as a scribe at 8:18 PM on 9/6/2018 to document services personally performed by Reanna Sandhu MD based on my observations and the provider's statements to me.      EMERGENCY DEPARTMENT       Reanna Sandhu MD  09/06/18 1290

## 2018-09-07 NOTE — ED NOTES
Bed: ED19  Expected date:   Expected time:   Means of arrival:   Comments:  Alicia [age]M Fall/Hit head w/ lac  ETA 2005

## 2020-01-18 ENCOUNTER — MEDICAL CORRESPONDENCE (OUTPATIENT)
Dept: CARDIAC REHAB | Facility: CLINIC | Age: 85
End: 2020-01-18

## 2021-03-22 ASSESSMENT — MIFFLIN-ST. JEOR: SCORE: 1336.65

## 2021-04-14 ENCOUNTER — ASSISTED LIVING VISIT (OUTPATIENT)
Dept: GERIATRICS | Facility: CLINIC | Age: 86
End: 2021-04-14
Payer: MEDICARE

## 2021-04-14 VITALS
OXYGEN SATURATION: 97 % | HEIGHT: 68 IN | WEIGHT: 160 LBS | SYSTOLIC BLOOD PRESSURE: 125 MMHG | BODY MASS INDEX: 24.25 KG/M2 | HEART RATE: 57 BPM | DIASTOLIC BLOOD PRESSURE: 66 MMHG | RESPIRATION RATE: 16 BRPM

## 2021-04-14 DIAGNOSIS — N40.0 BENIGN PROSTATIC HYPERPLASIA, UNSPECIFIED WHETHER LOWER URINARY TRACT SYMPTOMS PRESENT: ICD-10-CM

## 2021-04-14 DIAGNOSIS — Z91.81 PERSONAL HISTORY OF FALL: ICD-10-CM

## 2021-04-14 DIAGNOSIS — Z95.1 HX OF CABG: ICD-10-CM

## 2021-04-14 DIAGNOSIS — Z78.9 ANTICOAGULATION THERAPY NOT INDICATED: ICD-10-CM

## 2021-04-14 DIAGNOSIS — I25.10 CORONARY ARTERY DISEASE INVOLVING NATIVE CORONARY ARTERY OF NATIVE HEART WITHOUT ANGINA PECTORIS: ICD-10-CM

## 2021-04-14 DIAGNOSIS — I63.89 CEREBRAL INFARCTION DUE TO OTHER MECHANISM (H): ICD-10-CM

## 2021-04-14 DIAGNOSIS — F03.B0 MODERATE DEMENTIA WITHOUT BEHAVIORAL DISTURBANCE (H): Primary | ICD-10-CM

## 2021-04-14 DIAGNOSIS — R29.898 RIGHT LEG WEAKNESS: ICD-10-CM

## 2021-04-14 DIAGNOSIS — E78.2 MIXED HYPERLIPIDEMIA: ICD-10-CM

## 2021-04-14 DIAGNOSIS — Z86.79 HISTORY OF INTRACRANIAL HEMORRHAGE: ICD-10-CM

## 2021-04-14 DIAGNOSIS — Z86.79 HISTORY OF ATRIAL FIBRILLATION: ICD-10-CM

## 2021-04-14 PROBLEM — Z87.891 PERSONAL HISTORY OF TOBACCO USE: Status: ACTIVE | Noted: 2021-04-14

## 2021-04-14 PROBLEM — I48.20 CHRONIC ATRIAL FIBRILLATION (H): Status: RESOLVED | Noted: 2017-10-09 | Resolved: 2021-04-14

## 2021-04-14 PROBLEM — T07.XXXA MULTIPLE BRUISES: Status: RESOLVED | Noted: 2017-10-09 | Resolved: 2021-04-14

## 2021-04-14 RX ORDER — TAMSULOSIN HYDROCHLORIDE 0.4 MG/1
0.4 CAPSULE ORAL DAILY
COMMUNITY
Start: 2021-03-22 | End: 2021-04-14

## 2021-04-14 RX ORDER — METOPROLOL TARTRATE 25 MG/1
25 TABLET, FILM COATED ORAL 2 TIMES DAILY
COMMUNITY
End: 2021-04-14

## 2021-04-14 RX ORDER — ATORVASTATIN CALCIUM 20 MG/1
20 TABLET, FILM COATED ORAL DAILY
COMMUNITY
End: 2021-04-14

## 2021-04-14 SDOH — HEALTH STABILITY: MENTAL HEALTH: HOW OFTEN DO YOU HAVE 6 OR MORE DRINKS ON ONE OCCASION?: NOT ASKED

## 2021-04-14 SDOH — HEALTH STABILITY: MENTAL HEALTH: HOW MANY STANDARD DRINKS CONTAINING ALCOHOL DO YOU HAVE ON A TYPICAL DAY?: NOT ASKED

## 2021-04-14 SDOH — HEALTH STABILITY: MENTAL HEALTH: HOW OFTEN DO YOU HAVE A DRINK CONTAINING ALCOHOL?: NOT ASKED

## 2021-04-14 ASSESSMENT — MIFFLIN-ST. JEOR: SCORE: 1350.26

## 2021-04-14 NOTE — LETTER
2021        RE: Rell Erazo  [facility]  [address]  [place] [address]        [facility]  PRIMARY CARE PROVIDER AND CLINIC:  ADRYAN Castellanos CNP, [address] [place] [address] / [facility] [address]  Chief Complaint   Patient presents with     Establish Care     [place] [facility] Medical Record Number:  [id number]  Place of Service where encounter took place:  [facility] [[id number]]    Rell Erazo  is a 89 year old  (1928), [facility]...  Admitted to this facility for  rehab, medical management and nursing care.     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   Brief Summary of Hospital Course: no recent hospital stay.  Pt had been cared for at home by his daughters  But they can no longer care for him. [facility].  Updates on Status Since Skilled nursing Admission: he was admitted to [facility] on 2021.  He had not seen an MD since 2020 with COVID issues.  He had at one point been going to [facility] per charts. His wife  in 2018.    CODE STATUS/ADVANCE DIRECTIVES DISCUSSION:   DNR/DNI--POLST signed 2021  Patient's living condition: lived with daughters   ALLERGIES: Cat hair extract  PAST MEDICAL HISTORY:  has a past  "medical history of ACP (advance care planning) (04/26/2012), BPH (benign prostatic hyperplasia), CAD (coronary artery disease), Carotid disease, bilateral (H), Chronic atrial fibrillation (H), CVA (cerebral infarction) (01/01/2013), Diverticulosis of large intestine, Hyperlipidemia LDL goal < 100 (04/26/2012), Kidney stone (1981), Personal history of tobacco use, presenting hazards to health, Restless legs syndrome (RLS), and TIA (transient ischemic attack) (05/2013).  PAST SURGICAL HISTORY:   has a past surgical history that includes Cardiac surgery (04/01/2012) and Bypass graft artery coronary (04/24/2012).  FAMILY HISTORY: family history includes Heart Disease in his brother and father; Other - See Comments in his mother.  SOCIAL HISTORY:   reports that he quit smoking about 40 years ago. He has a 2.80 pack-year smoking history. He has never used smokeless tobacco. He reports current alcohol use of about 7.0 standard drinks of alcohol per week. He reports that he does not use drugs.    Post Discharge Medication Reconciliation Status: discharge medications reconciled and changed, per note/orders     No current outpatient medications on file.         TODAY DURING EXAM/ROS: limited as pt is confused and answers not always accurate.  He denies CP, SOB, Cough, dizziness,  HA, N/V, dysuria or Bowel Abnormalities--(incont urine per staff). Appetite is fair.  No pain.   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\"    Vitals:  /66   Pulse 57   Resp 16   Ht 1.727 m (5' 8\")   Wt 72.6 kg (160 lb)   SpO2 97%   BMI 24.33 kg/m    Exam:  GENERAL APPEARANCE:  Alert, in no distress, oriented, cooperative  ENT:  Mouth and posterior oropharynx normal, moist mucous membranes, normal hearing acuity  EYES:  EOM, conjunctivae, lids, pupils and irises normal  NECK:  No adenopathy,masses or thyromegaly  RESP:  respiratory effort  of chest normal, lungs clear to auscultation , no " respiratory distress  CV:  Auscultation of heart done , regular rate and rhythm--?Irreg beats, no murmur, rub, or gallop, no edema, +2 pedal pulses  ABDOMEN:  normal bowel sounds, soft, nontender  M/S:   FREIRE equally, up with walker. Has +weakness RLE mildly  SKIN:  Inspection of skin and subcutaneous tissue baseline bu limited as pt fully dressed neatly and sitting in chair.  NEURO:   Cranial nerves  are normal tested and grossly at patient's baseline--  PSYCH:  oriented X 2, insight and judgement impaired, memory impaired , affect and mood normal     Lab/Diagnostic data:  None since 9/2020        ASSESSMENT / PLAN:  (F03.90) Moderate dementia without behavioral disturbance (H)  (primary encounter diagnosis)  Comment: 2017: Mild dementia: SLUMS 20/30 and Safety Eval is 7.5/27  Plan: no recent cog scores but pt is much more impaired.  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)  (R29.898) Right leg weakness  (Z78.9) Anticoagulation therapy not indicated: since fall /IC bleed in 2017  Comment/Plan: on no meds--check BMP and CBC< Vit D levels 4/27    (Z86.79) History of intracranial hemorrhage: 2017 after fall  (Z91.81) Personal history of fall  Comment/Plan: fall risk, uses walker get PT eval to look for new strategies to help with settling into new environment safely      (N40.0) Benign prostatic hyperplasia, unspecified whether lower urinary tract symptoms present  Comment/Plan: had been on Flomax how long but family felt it was not helpful so they stopped it priori to coming to [place]       Total time spent with patient visit at the skilled nursing facility was 55 including patient visit, review of past records, phone call to patient contact daughterSelam  (11mins). Greater than 50% of total time spent with counseling and coordinating care due to d/w her of case, POC, getting more history from her.     Electronically signed by:  ADRYAN Castellanos CNP                           Sincerely,        ADRYAN Castellanos CNP

## 2021-04-14 NOTE — PROGRESS NOTES
[facility]  PRIMARY CARE PROVIDER AND CLINIC:  Selam Fowler, ADRYAN CNP, [address] / [place] [address]  Chief Complaint   Patient presents with     Establish Care     [facility] Medical Record Number:  [id number]  Place of Service where encounter took place:  [facility] [[id number]]    Rell Erazo  is a 89 year old  (1928), [facility]  Admitted to this facility for  rehab, medical management and nursing care.     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   Brief Summary of Hospital Course: no recent hospital stay.  Pt had been cared for at home by his daughters  But they can no longer care for him. [facility].  Updates on Status Since Skilled nursing Admission: he was admitted to [facility] on 2021.  He had not seen an MD since 2020 with COVID issues.  He had at one point been going to  [facility] per charts. His wife  in 2018.    CODE STATUS/ADVANCE DIRECTIVES DISCUSSION:   DNR/DNI--POLST signed 2021  Patient's living condition: lived with daughters   ALLERGIES: Cat hair extract  PAST MEDICAL HISTORY:  has a past medical history of ACP (advance care planning) (2012), BPH (benign prostatic hyperplasia), CAD (coronary artery  "disease), Carotid disease, bilateral (H), Chronic atrial fibrillation (H), CVA (cerebral infarction) (01/01/2013), Diverticulosis of large intestine, Hyperlipidemia LDL goal < 100 (04/26/2012), Kidney stone (1981), Personal history of tobacco use, presenting hazards to health, Restless legs syndrome (RLS), and TIA (transient ischemic attack) (05/2013).  PAST SURGICAL HISTORY:   has a past surgical history that includes Cardiac surgery (04/01/2012) and Bypass graft artery coronary (04/24/2012).  FAMILY HISTORY: family history includes Heart Disease in his brother and father; Other - See Comments in his mother.  SOCIAL HISTORY:   reports that he quit smoking about 40 years ago. He has a 2.80 pack-year smoking history. He has never used smokeless tobacco. He reports current alcohol use of about 7.0 standard drinks of alcohol per week. He reports that he does not use drugs.    Post Discharge Medication Reconciliation Status: discharge medications reconciled and changed, per note/orders     No current outpatient medications on file.         TODAY DURING EXAM/ROS: limited as pt is confused and answers not always accurate.  He denies CP, SOB, Cough, dizziness,  HA, N/V, dysuria or Bowel Abnormalities--(incont urine per staff). Appetite is fair.  No pain.   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\"    Vitals:  /66   Pulse 57   Resp 16   Ht 1.727 m (5' 8\")   Wt 72.6 kg (160 lb)   SpO2 97%   BMI 24.33 kg/m    Exam:  GENERAL APPEARANCE:  Alert, in no distress, oriented, cooperative  ENT:  Mouth and posterior oropharynx normal, moist mucous membranes, normal hearing acuity  EYES:  EOM, conjunctivae, lids, pupils and irises normal  NECK:  No adenopathy,masses or thyromegaly  RESP:  respiratory effort  of chest normal, lungs clear to auscultation , no respiratory distress  CV:  Auscultation of heart done , regular rate and rhythm--?Irreg beats, no murmur, rub, or " gallop, no edema, +2 pedal pulses  ABDOMEN:  normal bowel sounds, soft, nontender  M/S:   FREIRE equally, up with walker. Has +weakness RLE mildly  SKIN:  Inspection of skin and subcutaneous tissue baseline bu limited as pt fully dressed neatly and sitting in chair.  NEURO:   Cranial nerves  are normal tested and grossly at patient's baseline--  PSYCH:  oriented X 2, insight and judgement impaired, memory impaired , affect and mood normal     Lab/Diagnostic data:  None since 9/2020        ASSESSMENT / PLAN:  (F03.90) Moderate dementia without behavioral disturbance (H)  (primary encounter diagnosis)  Comment: 2017: Mild dementia: SLUMS 20/30 and Safety Eval is 7.5/27  Plan: no recent cog scores but pt is much more impaired.  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)  (R29.898) Right leg weakness  (Z78.9) Anticoagulation therapy not indicated: since fall /IC bleed in 2017  Comment/Plan: on no meds--check BMP and CBC< Vit D levels 4/27    (Z86.79) History of intracranial hemorrhage: 2017 after fall  (Z91.81) Personal history of fall  Comment/Plan: fall risk, uses walker get PT eval to look for new strategies to help with settling into new environment safely      (N40.0) Benign prostatic hyperplasia, unspecified whether lower urinary tract symptoms present  Comment/Plan: had been on Flomax how long but family felt it was not helpful so they stopped it priori to coming to [place]       Total time spent with patient visit at the skilled nursing facility was 55 including patient visit, review of past records, phone call to patient contact daughter, Selam (11mins). Greater than 50% of total time spent with counseling and coordinating care due to d/w her of case, POC,  getting more history from her.     Electronically signed by:  ADRYAN Castellanos CNP       Documentation of Face to Face and Certification for Home Health Services    I certify that patient: Rell Erazo is under my care and that I, or a nurse practitioner or physician's assistant working with me, had a face-to-face encounter that meets the physician face-to-face encounter requirements with this patient on: 4/14/2021.    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    I certify that, based on my findings, the following services are medically necessary home health services: Physical Therapy.    My clinical findings support the need for the above services because: Physical Therapy Services are needed to assess and treat the following functional impairments: right leg weakness from stroke, deconditioning and hx falls. Has dementia, wanders and not able to go out for appts    Further, I certify that my clinical findings support that this patient is homebound (i.e. absences from home require considerable and taxing effort and are for medical reasons or [employer] services or infrequently or of short duration when for other reasons) because: Requires assistance of another person or specialized equipment to access medical services because patient:  fall risk, uses walker has sba..    Based on the above findings. I certify that this patient is  confined to the home and needs intermittent skilled nursing care, physical therapy and/or speech therapy.  The patient is under my care, and I have initiated the establishment of the plan of care.  This patient will be followed by a physician who will periodically review the plan of care.  Physician/Provider to provide follow up care: Selam Fowler    Attending [facility] physician (the Medicare certified [facility] provider): ADRYAN Castellanos CNP  Physician Signature: See electronic signature associated with these discharge orders.  Date: 4/29/2021

## 2021-04-26 ENCOUNTER — RECORDS - HEALTHEAST (OUTPATIENT)
Dept: LAB | Facility: CLINIC | Age: 86
End: 2021-04-26

## 2021-04-27 ENCOUNTER — ASSISTED LIVING VISIT (OUTPATIENT)
Dept: GERIATRICS | Facility: CLINIC | Age: 86
End: 2021-04-27
Payer: MEDICARE

## 2021-04-27 ENCOUNTER — TRANSFERRED RECORDS (OUTPATIENT)
Dept: HEALTH INFORMATION MANAGEMENT | Facility: CLINIC | Age: 86
End: 2021-04-27

## 2021-04-27 VITALS
HEART RATE: 57 BPM | OXYGEN SATURATION: 97 % | DIASTOLIC BLOOD PRESSURE: 66 MMHG | RESPIRATION RATE: 16 BRPM | SYSTOLIC BLOOD PRESSURE: 125 MMHG | TEMPERATURE: 98.7 F

## 2021-04-27 DIAGNOSIS — E55.9 VITAMIN D DEFICIENCY: ICD-10-CM

## 2021-04-27 DIAGNOSIS — N18.31 STAGE 3A CHRONIC KIDNEY DISEASE (H): ICD-10-CM

## 2021-04-27 DIAGNOSIS — I25.119 CORONARY ARTERY DISEASE INVOLVING NATIVE HEART WITH ANGINA PECTORIS, UNSPECIFIED VESSEL OR LESION TYPE (H): ICD-10-CM

## 2021-04-27 DIAGNOSIS — F03.A0 MILD DEMENTIA (H): Primary | ICD-10-CM

## 2021-04-27 LAB
ANION GAP SERPL CALCULATED.3IONS-SCNC: 10 MMOL/L (ref 5–18)
ANION GAP SERPL CALCULATED.3IONS-SCNC: 10 MMOL/L (ref 5–18)
BUN SERPL-MCNC: 26 MG/DL (ref 5–28)
BUN SERPL-MCNC: 26 MG/DL (ref 8–28)
CALCIUM SERPL-MCNC: 8.9 MG/DL (ref 8.5–10.5)
CALCIUM SERPL-MCNC: 8.9 MG/DL (ref 8.5–10.5)
CHLORIDE BLD-SCNC: 103 MMOL/L (ref 98–107)
CHLORIDE SERPLBLD-SCNC: 103 MMOL/L (ref 98–107)
CO2 SERPL-SCNC: 27 MMOL/L (ref 22–31)
CO2 SERPL-SCNC: 27 MMOL/L (ref 22–31)
CREAT SERPL-MCNC: 1.48 MG/DL (ref 0.7–1.3)
CREAT SERPL-MCNC: 1.48 MG/DL (ref 0.7–1.3)
ERYTHROCYTE [DISTWIDTH] IN BLOOD BY AUTOMATED COUNT: 12.5 % (ref 11–14.5)
ERYTHROCYTE [DISTWIDTH] IN BLOOD BY AUTOMATED COUNT: 12.5 % (ref 11–14.5)
GFR SERPL CREATININE-BSD FRML MDRD: 44 ML/MIN/1.73M2
GFR SERPL CREATININE-BSD FRML MDRD: 44 ML/MIN/1.73M2
GLUCOSE BLD-MCNC: 128 MG/DL (ref 70–125)
GLUCOSE SERPL-MCNC: 128 MG/DL (ref 70–125)
HCT VFR BLD AUTO: 42.4 % (ref 40–54)
HCT VFR BLD AUTO: 42.4 % (ref 40–54)
HEMOGLOBIN: 13.8 G/DL (ref 14–18)
HGB BLD-MCNC: 13.8 G/DL (ref 14–18)
MCH RBC QN AUTO: 31.7 PG (ref 27–34)
MCH RBC QN AUTO: 31.7 PG (ref 27–34)
MCHC RBC AUTO-ENTMCNC: 32.5 G/DL (ref 32–36)
MCHC RBC AUTO-ENTMCNC: 32.5 G/DL (ref 32–36)
MCV RBC AUTO: 98 FL (ref 80–100)
MCV RBC AUTO: 98 FL (ref 80–100)
PLATELET # BLD AUTO: 230 THOU/UL (ref 140–440)
PLATELET # BLD AUTO: 230 THOU/UL (ref 140–440)
PMV BLD AUTO: 10 FL (ref 8.5–12.5)
POTASSIUM BLD-SCNC: 4.3 MMOL/L (ref 3.5–5)
POTASSIUM SERPL-SCNC: 4.3 MMOL/L (ref 3.5–5)
RBC # BLD AUTO: 4.35 MILL/UL (ref 4.4–6.2)
RBC # BLD AUTO: 4.35 MILL/UL (ref 4.4–6.2)
SODIUM SERPL-SCNC: 140 MMOL/L (ref 136–145)
SODIUM SERPL-SCNC: 140 MMOL/L (ref 136–145)
WBC # BLD AUTO: 5.8 THOU/UL (ref 4–11)
WBC: 5.8 THOU/UL (ref 4–11)

## 2021-04-27 NOTE — LETTER
4/27/2021        RE: Rell Erazo  [facility]  [address]  [place] [address]        CC: Lab Recheck     HPI:    Rell Erazo is a 89 year old  (6/5/1928), who is being seen today for an episodic care visit at [facility] ([facility] Today's concern: lab recheck of CBC, BMP, VIT D.         OBJECTIVE: A & O, forgetful, in NAD.  resp pattern is non labored.  No new focal neurological deficits.      /66   Pulse 57   Temp 98.7  F (37.1  C)   Resp 16   SpO2 97%     LABS: today    Recent Labs   Lab Test 04/27/21  Nurse reports  BMP wnl except as below noted-faxed to   [employer] 10/03/17  0900   NA  139   POTASSIUM  4.3   CHLORIDE  105   CO2  27   ANIONGAP  7    99   BUN  18   CR /GFR 44 1.07   IMNG  8.7     CBC RESULTS:   Recent Labs   Lab Test 04/27/21   WBC 5.8   RBC 4.35*   HGB 13.8*   HCT 42.4   MCV 98   MCH 31.7   MCHC 32.5   RDW 12.5        4/27/21: vitamin D==pending     ASSESSMENT / PLAN:  (F03.90) Mild dementia (H)  (primary encounter diagnosis)  Comment/Plan: in memory care, no acute issues, seems to be settling in     (I25.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      Electronically Signed by:    Selam Fowler RN, CNP            Sincerely,        Selam Fowler, APRN CNP

## 2021-04-27 NOTE — PROGRESS NOTES
CC: Lab Recheck     HPI:    Rell Erazo is a 89 year old  (6/5/1928), who is being seen today for an episodic care visit at [facility]). Today's concern: lab recheck of CBC, BMP, VIT D.         OBJECTIVE: A & O, forgetful, in NAD.  resp pattern is non labored.  No new focal neurological deficits.      /66   Pulse 57   Temp 98.7  F (37.1  C)   Resp 16   SpO2 97%     LABS: today    Recent Labs   Lab Test 04/27/21  Nurse reports  BMP wnl except as below noted-faxed to   [facility] 10/03/17  0900   NA  139   POTASSIUM  4.3   CHLORIDE  105   CO2  27   ANIONGAP  7    99   BUN  18   CR /GFR 44 1.07   MING  8.7     CBC RESULTS:   Recent Labs   Lab Test 04/27/21   WBC 5.8   RBC 4.35*   HGB 13.8*   HCT 42.4   MCV 98   MCH 31.7   MCHC 32.5   RDW 12.5        4/27/21: vitamin D==pending     ASSESSMENT / PLAN:  (F03.90) Mild dementia (H)  (primary encounter diagnosis)  Comment/Plan: in memory care, no acute issues, seems to be settling in     (I25.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      Electronically Signed by:    Selam Fowler RN, CNP

## 2021-04-28 LAB — 25(OH)D3 SERPL-MCNC: 19.5 NG/ML (ref 30–80)

## 2021-05-12 ENCOUNTER — ASSISTED LIVING VISIT (OUTPATIENT)
Dept: GERIATRICS | Facility: CLINIC | Age: 86
End: 2021-05-12
Payer: MEDICARE

## 2021-05-12 VITALS
WEIGHT: 160 LBS | DIASTOLIC BLOOD PRESSURE: 66 MMHG | TEMPERATURE: 98.7 F | BODY MASS INDEX: 24.33 KG/M2 | SYSTOLIC BLOOD PRESSURE: 125 MMHG | HEART RATE: 57 BPM | RESPIRATION RATE: 16 BRPM | OXYGEN SATURATION: 97 %

## 2021-05-12 DIAGNOSIS — E55.9 VITAMIN D DEFICIENCY: ICD-10-CM

## 2021-05-12 DIAGNOSIS — N18.31 STAGE 3A CHRONIC KIDNEY DISEASE (H): Primary | ICD-10-CM

## 2021-05-12 DIAGNOSIS — I63.89 CEREBRAL INFARCTION DUE TO OTHER MECHANISM (H): ICD-10-CM

## 2021-05-12 DIAGNOSIS — I25.10 CORONARY ARTERY DISEASE INVOLVING NATIVE CORONARY ARTERY OF NATIVE HEART WITHOUT ANGINA PECTORIS: ICD-10-CM

## 2021-05-12 DIAGNOSIS — F03.B0 MODERATE DEMENTIA WITHOUT BEHAVIORAL DISTURBANCE (H): ICD-10-CM

## 2021-05-12 NOTE — LETTER
5/12/2021        RE: Rell Erazo  [facility]  [address]  [facility] [address]        Rell Erazo is a 89 year old male seen May 24, 2021 at [facility] unit where he has resided for one month (admit 4/2021) seen for initial visit.   Pt had been living in the community with his daughters providing care, but memory concerns worsening and he needed more support, so has moved to AL.  Today patient is seen on the unit up to chair.  Notes he is feeling okay, denies any current pain, dyspnea or other symptoms.   No concerns or behavioral sx reported by AL nursing staff.   [facility]   He has CAD with h/o CABG in 2012, and cerebral vascular ds with h/o stroke in 2014.  He had a fall with ICH in 2017 while on warfarin +ASA.   He had worsening of his baseline dementia since that time.  At some point he stopped all medications and admitted to AL without any scheduled meds.   Recently started on vit D for level of 19.      Past Medical History:   Diagnosis Date     ACP (advance care planning) 04/26/2012     BPH (benign prostatic hyperplasia)      CAD (coronary artery disease)     S/P 3-vessel CABG 2012 4/26/2012 s/p CABG 4/24/2012; LIMA to LAd, SV to OM, SV to RCA     Carotid disease, bilateral (H)     Less than 50% stenosis     Chronic atrial fibrillation (H)      CVA (cerebral infarction) 01/01/2013    Small, bifrontal CVA     Diverticulosis of large intestine      Hyperlipidemia LDL goal < 100 04/26/2012    Mixed hyperlipidemia     Kidney stone 1981     Personal history of tobacco use, presenting hazards to health     Quit in 1981     Restless legs syndrome (RLS)      TIA (transient ischemic attack) 05/2013    rt arm weakness       Past Surgical History:   Procedure Laterality Date     BYPASS GRAFT ARTERY CORONARY  04/24/2012    X3 LAD,RCA, obtuse marginal     CARDIAC SURGERY  04/01/2012    x3 CABG     SH:    2018, has 2 daughters.    Previously cared for at home by his daughters.    Attended [employer] [facility].   Past smoker      ROS:  SLUMS 20/30   Safety questionnaire 7.5/27 in 2017    Weight is stable     EXAM:  NAD  /66   Pulse 57   Temp 98.7  F (37.1  C)   Resp 16   Wt 72.6 kg (160 lb)   SpO2 97%   BMI 24.33 kg/m     Neck supple without adenopathy  Lungs clear bilaterally  Heart RRR s1s2, +ectopy  Abd soft, NT, no distention or guarding, +BS  Ext without edema  Neuro: limited history, RLE weakness, no tremor or stiffness  Psych: affect okay     Last Comprehensive Metabolic Panel:  Sodium   Date Value Ref Range Status   04/27/2021 140 136 - 145 mmol/L Final     Potassium   Date Value Ref Range Status   04/27/2021 4.3 3.5 - 5.0 mmol/L Final     Chloride   Date Value Ref Range Status   04/27/2021 103 98 - 107 mmol/L Final     Carbon Dioxide   Date Value Ref Range Status   04/27/2021 27 22 - 31 mmol/L Final     Anion Gap   Date Value Ref Range Status   04/27/2021 10 5 - 18 mmol/L Final     Glucose   Date Value Ref Range Status   04/27/2021 128 (A) 70 - 125 mg/dL Final     Urea Nitrogen   Date Value Ref Range Status   04/27/2021 26 5 - 28 mg/dL Final     Creatinine   Date Value Ref Range Status   04/27/2021 1.48 (A) 0.70 - 1.30 mg/dL Final     GFR Estimate   Date Value Ref Range Status   04/27/2021 44 (A) >60 ml/min/1.73m2 Final     Calcium   Date Value Ref Range Status   04/27/2021 8.9 8.5 - 10.5 mg/dL Final     Lab Results   Component Value Date    WBC 5.8 04/27/2021      HGB 13.8 04/27/2021      MCV 98 04/27/2021       04/27/2021        IMP/PLAN:   (N18.31) Stage 3a chronic kidney disease    Comment: GFR 44   Plan: Renal dosing of medications, follow BMP      (I25.10) Coronary artery disease involving native coronary artery of native heart without angina pectoris  Comment: s/p CABG  Plan: no current medications, monitor for symptoms, consider adding prn NTG     (I63.89) Cerebral infarction  due to other mechanism (H)  (F03.90) Moderate dementia without behavioral disturbance (H)  Comment: may be mixed Alzheimer's /vascular dementia worsening after ICH  Plan: AL Memory Care unit support for med admin, meals, activity, secure unit       (E55.9) Vitamin D deficiency  Comment: level 19    Remains ambulatory   Plan: vit D replacement, dietary calcium  Has had [facility] PHYSICAL THERAPY for balance and gait.      Emerald Talley MD           Sincerely,        Emerald Talley MD

## 2021-05-25 NOTE — PROGRESS NOTES
Rell Erazo is a 89 year old male seen May 24, 2021 at [facility] unit where he has resided for one month (admit 4/2021) seen for initial visit.   Pt had been living in the community with his daughters providing care, but memory concerns worsening and he needed more support, so has moved to AL.  Today patient is seen on the unit up to chair.  Notes he is feeling okay, denies any current pain, dyspnea or other symptoms.   No concerns or behavioral sx reported by AL nursing staff.   [facility]   He has CAD with h/o CABG in 2012, and cerebral vascular ds with h/o stroke in 2014.  He had a fall with ICH in 2017 while on warfarin +ASA.   He had worsening of his baseline dementia since that time.  At some point he stopped all medications and admitted to AL without any scheduled meds.   Recently started on vit D for level of 19.      Past Medical History:   Diagnosis Date     ACP (advance care planning) 04/26/2012     BPH (benign prostatic hyperplasia)      CAD (coronary artery disease)     S/P 3-vessel CABG 2012 4/26/2012 s/p CABG 4/24/2012; LIMA to LAd, SV to OM, SV to RCA     Carotid disease, bilateral (H)     Less than 50% stenosis     Chronic atrial fibrillation (H)      CVA (cerebral infarction) 01/01/2013    Small, bifrontal CVA     Diverticulosis of large intestine      Hyperlipidemia LDL goal < 100 04/26/2012    Mixed hyperlipidemia     Kidney stone 1981     Personal history of tobacco use, presenting hazards to health     Quit in 1981     Restless legs syndrome (RLS)      TIA (transient ischemic attack) 05/2013    rt arm weakness       Past Surgical History:   Procedure Laterality Date     BYPASS GRAFT ARTERY CORONARY  04/24/2012    X3 LAD,RCA, obtuse marginal     CARDIAC SURGERY  04/01/2012    x3 CABG     SH:   2018, has 2 daughters.    Previously cared for at home by his daughters.    Attended [employer] [facility].   Past  smoker      ROS:  SLUMS 20/30   Safety questionnaire 7.5/27 in 2017    Weight is stable     EXAM:  NAD  /66   Pulse 57   Temp 98.7  F (37.1  C)   Resp 16   Wt 72.6 kg (160 lb)   SpO2 97%   BMI 24.33 kg/m     Neck supple without adenopathy  Lungs clear bilaterally  Heart RRR s1s2, +ectopy  Abd soft, NT, no distention or guarding, +BS  Ext without edema  Neuro: limited history, RLE weakness, no tremor or stiffness  Psych: affect okay     Last Comprehensive Metabolic Panel:  Sodium   Date Value Ref Range Status   04/27/2021 140 136 - 145 mmol/L Final     Potassium   Date Value Ref Range Status   04/27/2021 4.3 3.5 - 5.0 mmol/L Final     Chloride   Date Value Ref Range Status   04/27/2021 103 98 - 107 mmol/L Final     Carbon Dioxide   Date Value Ref Range Status   04/27/2021 27 22 - 31 mmol/L Final     Anion Gap   Date Value Ref Range Status   04/27/2021 10 5 - 18 mmol/L Final     Glucose   Date Value Ref Range Status   04/27/2021 128 (A) 70 - 125 mg/dL Final     Urea Nitrogen   Date Value Ref Range Status   04/27/2021 26 5 - 28 mg/dL Final     Creatinine   Date Value Ref Range Status   04/27/2021 1.48 (A) 0.70 - 1.30 mg/dL Final     GFR Estimate   Date Value Ref Range Status   04/27/2021 44 (A) >60 ml/min/1.73m2 Final     Calcium   Date Value Ref Range Status   04/27/2021 8.9 8.5 - 10.5 mg/dL Final     Lab Results   Component Value Date    WBC 5.8 04/27/2021      HGB 13.8 04/27/2021      MCV 98 04/27/2021       04/27/2021        IMP/PLAN:   (N18.31) Stage 3a chronic kidney disease    Comment: GFR 44   Plan: Renal dosing of medications, follow BMP      (I25.10) Coronary artery disease involving native coronary artery of native heart without angina pectoris  Comment: s/p CABG  Plan: no current medications, monitor for symptoms, consider adding prn NTG     (I63.89) Cerebral infarction due to other mechanism (H)  (F03.90) Moderate dementia without behavioral disturbance (H)  Comment: may be mixed  Alzheimer's /vascular dementia worsening after ICH  Plan: AL Memory Care unit support for med admin, meals, activity, secure unit       (E55.9) Vitamin D deficiency  Comment: level 19    Remains ambulatory   Plan: vit D replacement, dietary calcium  Has had [facility] PHYSICAL THERAPY for balance and gait.      Emerald Talley MD

## 2021-06-03 ENCOUNTER — ASSISTED LIVING VISIT (OUTPATIENT)
Dept: GERIATRICS | Facility: CLINIC | Age: 86
End: 2021-06-03
Payer: MEDICARE

## 2021-06-03 VITALS
TEMPERATURE: 98.7 F | OXYGEN SATURATION: 97 % | RESPIRATION RATE: 16 BRPM | DIASTOLIC BLOOD PRESSURE: 66 MMHG | HEART RATE: 57 BPM | SYSTOLIC BLOOD PRESSURE: 125 MMHG

## 2021-06-03 DIAGNOSIS — I25.10 CORONARY ARTERY DISEASE INVOLVING NATIVE CORONARY ARTERY OF NATIVE HEART WITHOUT ANGINA PECTORIS: ICD-10-CM

## 2021-06-03 DIAGNOSIS — R29.898 RIGHT LEG WEAKNESS: ICD-10-CM

## 2021-06-03 DIAGNOSIS — N18.31 STAGE 3A CHRONIC KIDNEY DISEASE (H): ICD-10-CM

## 2021-06-03 DIAGNOSIS — R45.1 AGITATION: ICD-10-CM

## 2021-06-03 DIAGNOSIS — F03.B18 MODERATE DEMENTIA WITH BEHAVIORAL DISTURBANCE (H): Primary | ICD-10-CM

## 2021-06-03 DIAGNOSIS — I63.89 CEREBRAL INFARCTION DUE TO OTHER MECHANISM (H): ICD-10-CM

## 2021-06-03 RX ORDER — LORAZEPAM 0.5 MG/1
0.5 TABLET ORAL DAILY PRN
Qty: 10 TABLET | Refills: 1 | Status: SHIPPED | OUTPATIENT
Start: 2021-06-03 | End: 2021-12-23

## 2021-06-03 RX ORDER — LORAZEPAM 0.5 MG/1
0.5 TABLET ORAL DAILY PRN
COMMUNITY
Start: 2021-06-03 | End: 2021-06-04

## 2021-06-03 NOTE — LETTER
6/3/2021        RE: Rell Erazo  [facility]  [address]  [facility] [address]        Chief Complaint: review as pt often resistive to bathing or cares. Staff had a daughter try to cajole him recently but was unsuccessful.  This am they were able to get him to shower but this is rarely the case.    HPI:    Rell Erazo is a 89 year old  (6/5/1928), who is being seen today for an episodic care visit at [facility]. 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)  Right leg weakness  Stage 3a chronic kidney disease  Coronary artery disease involving native coronary artery of native heart without angina pectoris      REVIEW OF SYSTEMS:  limited secondary to cognitive impairment. Minimally conversant, cooperative with exam.  Current Outpatient Medications   Medication Sig Dispense Refill     LORazepam (ATIVAN) 0.5 MG tablet Take 1 tablet (0.5 mg) by mouth daily as needed for anxiety (Patient taking differently: Take 0.5 mg by mouth daily as needed for anxiety 1 hr before shower, and if patient resistant to bath. Prn) 10 tablet 1     calcium carbonate (TUMS) 500 MG chewable tablet Take 1 chew tab by mouth 2 times daily (with meals)       vitamin D2 (ERGOCALCIFEROL) 62720 units (1250 mcg) capsule Take 50,000 Units by mouth once a week X 8 WKS.         /66   Pulse 57   Temp 98.7  F (37.1  C)   Resp 16   SpO2 97%      GENERAL APPEARANCE:  Alert,oriented x person in no distress. Lungs CTA , S1/S2 without M/G/R  . No  edema. Abdomen is soft, +BTS . No focal neurological deficits except walks with slight deficit, limb right leg--uses walker.    Recent Labs   Lab Test 04/27/21      POTASSIUM 4.3   CHLORIDE 103   CO2 27   ANIONGAP 10   *   BUN 26   CR 1.48*   MING 8.9       ASSESSMENT / PLAN:  (F03.91) Moderate dementia with behavioral disturbance (H)  (primary encounter  diagnosis)   (R45.1) Agitation  Comment/Plan: Lorazepam (ATIVAN) 0.5 MG tablet only for bathing if nec.  D/w Dr Talley and msg left for daughter    (I63.89) Cerebral infarction due to other mechanism (H)  (R29.898) Right leg weakness  Comment/Plan: has gotten PT, walking pretty weil per staff, settling into unit. Monitor.    (N18.31) Stage 3a chronic kidney disease  Comment/Plan: reasonable function, will check in 6-12 mos or prn.    (I25.10) Coronary artery disease involving native coronary artery of native heart without angina pectoris  Comment/Plan: on no meds, family wishes to keep things simplistic if able.  Monitor.  He is in Memory care as needs help with all ADLs,etc.      Electronically Signed by:  ADRYAN Castellanos CNP          Sincerely,        ADRYAN Castellanos CNP

## 2021-06-03 NOTE — PROGRESS NOTES
Chief Complaint: review as pt often resistive to bathing or cares. Staff had a daughter try to cajole him recently but was unsuccessful.  This am they were able to get him to shower but this is rarely the case.    HPI:    Rlel Erazo is a 89 year old  (6/5/1928), who is being seen today for an episodic care visit at [facility]. 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)  Right leg weakness  Stage 3a chronic kidney disease  Coronary artery disease involving native coronary artery of native heart without angina pectoris      REVIEW OF SYSTEMS:  limited secondary to cognitive impairment. Minimally conversant, cooperative with exam.  Current Outpatient Medications   Medication Sig Dispense Refill     LORazepam (ATIVAN) 0.5 MG tablet Take 1 tablet (0.5 mg) by mouth daily as needed for anxiety (Patient taking differently: Take 0.5 mg by mouth daily as needed for anxiety 1 hr before shower, and if patient resistant to bath. Prn) 10 tablet 1     calcium carbonate (TUMS) 500 MG chewable tablet Take 1 chew tab by mouth 2 times daily (with meals)       vitamin D2 (ERGOCALCIFEROL) 01457 units (1250 mcg) capsule Take 50,000 Units by mouth once a week X 8 WKS.         /66   Pulse 57   Temp 98.7  F (37.1  C)   Resp 16   SpO2 97%      GENERAL APPEARANCE:  Alert,oriented x person in no distress. Lungs CTA , S1/S2 without M/G/R  . No  edema. Abdomen is soft, +BTS . No focal neurological deficits except walks with slight deficit, limb right leg--uses walker.    Recent Labs   Lab Test 04/27/21      POTASSIUM 4.3   CHLORIDE 103   CO2 27   ANIONGAP 10   *   BUN 26   CR 1.48*   MING 8.9       ASSESSMENT / PLAN:  (F03.91) Moderate dementia with behavioral disturbance (H)  (primary encounter diagnosis)   (R45.1) Agitation  Comment/Plan: Lorazepam (ATIVAN) 0.5 MG tablet only for bathing if nec.  D/w Dr Talley and msg  left for daughter    (I63.89) Cerebral infarction due to other mechanism (H)  (R29.898) Right leg weakness  Comment/Plan: has gotten PT, walking pretty weil per staff, settling into unit. Monitor.    (N18.31) Stage 3a chronic kidney disease  Comment/Plan: reasonable function, will check in 6-12 mos or prn.    (I25.10) Coronary artery disease involving native coronary artery of native heart without angina pectoris  Comment/Plan: on no meds, family wishes to keep things simplistic if able.  Monitor.  He is in Memory care as needs help with all ADLs,etc.      Electronically Signed by:  ADRYAN Castellanos CNP

## 2021-06-08 ENCOUNTER — ASSISTED LIVING VISIT (OUTPATIENT)
Dept: GERIATRICS | Facility: CLINIC | Age: 86
End: 2021-06-08
Payer: MEDICARE

## 2021-06-08 DIAGNOSIS — F03.B18 MODERATE DEMENTIA WITH BEHAVIORAL DISTURBANCE (H): Primary | ICD-10-CM

## 2021-06-08 DIAGNOSIS — I63.89 CEREBRAL INFARCTION DUE TO OTHER MECHANISM (H): ICD-10-CM

## 2021-06-08 DIAGNOSIS — R45.1 AGITATION: ICD-10-CM

## 2021-06-08 NOTE — PROGRESS NOTES
CC: agitation d/t dementia and resisting of cares/bathing.    HPI:    Rell Erazo is a 89 year old  (6/5/1928), who is being seen today for an episodic care visit at [facility] ([facility] Today's concern: lab recheck of Vitamin D        OBJECTIVE: Alert, up in chair, FREIRE equally, walks with walker  /66   Pulse 57   Temp 98.7  F (37.1  C)   Resp 16   SpO2 97%     LABS: Vitamin D pending    ASSESSMENT / PLAN:  (F03.91) Moderate dementia with behavioral disturbance (H)  (primary encounter diagnosis)   (R45.1) Agitation   (I63.89) Cerebral infarction due to other mechanism (H)  Comment/Plan: so far not needed the Ativan, allowed bathing. Cont with same POC        Electronically Signed by:    Selam Fowler RN, CNP

## 2021-06-08 NOTE — LETTER
6/8/2021        RE: Rell Erazo  [facility]  [address]  [facility] [address]        CC: agitation d/t dementia and resisting of cares/bathing.    HPI:    Rell Erazo is a 89 year old  (6/5/1928), who is being seen today for an episodic care visit at [facility] ([facility] Today's concern: lab recheck of Vitamin D        OBJECTIVE: Alert, up in chair, FREIRE equally, walks with walker  /66   Pulse 57   Temp 98.7  F (37.1  C)   Resp 16   SpO2 97%     LABS: Vitamin D pending    ASSESSMENT / PLAN:  (F03.91) Moderate dementia with behavioral disturbance (H)  (primary encounter diagnosis)   (R45.1) Agitation   (I63.89) Cerebral infarction due to other mechanism (H)  Comment/Plan: so far not needed the Ativan, allowed bathing. Cont with same POC        Electronically Signed by:    Selam Fowler RN, CNP            Sincerely,        ADRYAN Castellanos CNP

## 2021-06-09 VITALS
SYSTOLIC BLOOD PRESSURE: 134 MMHG | RESPIRATION RATE: 16 BRPM | TEMPERATURE: 98.7 F | HEART RATE: 65 BPM | WEIGHT: 158.5 LBS | OXYGEN SATURATION: 96 % | DIASTOLIC BLOOD PRESSURE: 65 MMHG | BODY MASS INDEX: 24.1 KG/M2

## 2021-06-28 NOTE — PROGRESS NOTES
CC: Lab Recheck     HPI:    Rell Erazo is a 89 year old  (6/5/1928), who is being seen today for an episodic care visit at [facility] [place]). Today's concern: lab recheck of Vitamin D Level.        OBJECTIVE: Alert, eating BF NAD.       /65   Pulse 65   Temp 98.7  F (37.1  C)   Resp 16   SpO2 96%     LABS: 6/29/21:  VITAMIN D LEVEL 25.9 (WAS 19.5)          ASSESSMENT / PLAN:  (E55.9) Vitamin D deficiency  (primary encounter diagnosis)  Comment/Plan:   improved but will give 4 more weeks (total of 12 weeks) of 30041 units Vit D. Also add daily vitamin D 1000u. Check on 8/24. Cont Ca+ also.        Electronically Signed by:    Selam Fowler RN, CNP      
no

## 2021-06-29 ENCOUNTER — RECORDS - HEALTHEAST (OUTPATIENT)
Dept: LAB | Facility: CLINIC | Age: 86
End: 2021-06-29

## 2021-06-29 ENCOUNTER — ASSISTED LIVING VISIT (OUTPATIENT)
Dept: GERIATRICS | Facility: CLINIC | Age: 86
End: 2021-06-29
Payer: MEDICARE

## 2021-06-29 ENCOUNTER — TRANSFERRED RECORDS (OUTPATIENT)
Dept: HEALTH INFORMATION MANAGEMENT | Facility: CLINIC | Age: 86
End: 2021-06-29

## 2021-06-29 VITALS
HEART RATE: 65 BPM | DIASTOLIC BLOOD PRESSURE: 65 MMHG | TEMPERATURE: 98.7 F | SYSTOLIC BLOOD PRESSURE: 134 MMHG | RESPIRATION RATE: 16 BRPM | OXYGEN SATURATION: 96 %

## 2021-06-29 DIAGNOSIS — E55.9 VITAMIN D DEFICIENCY: Primary | ICD-10-CM

## 2021-06-29 NOTE — LETTER
6/29/2021        RE: Rell Erazo  [facility]  [address]  [place] [address]        CC: Lab Recheck     HPI:    Rell Erazo is a 89 year old  (6/5/1928), who is being seen today for an episodic care visit at [facility] [place] ([facility]). Today's concern: lab recheck of Vitamin D Level.        OBJECTIVE: Alert, eating BF NAD.       /65   Pulse 65   Temp 98.7  F (37.1  C)   Resp 16   SpO2 96%     LABS: 6/29/21:  VITAMIN D LEVEL 25.9 (WAS 19.5)          ASSESSMENT / PLAN:  (E55.9) Vitamin D deficiency  (primary encounter diagnosis)  Comment/Plan:   improved but will give 4 more weeks (total of 12 weeks) of 62184 units Vit D. Also add daily vitamin D 1000u. Check on 8/24. Cont Ca+ also.        Electronically Signed by:    Selam Fowler RN, CNP            Sincerely,        ADRYAN Castellanos CNP

## 2021-06-30 LAB — 25(OH)D3 SERPL-MCNC: 25.9 NG/ML (ref 30–80)

## 2021-07-01 RX ORDER — CHOLECALCIFEROL (VITAMIN D3) 1250 MCG
1250 CAPSULE ORAL
COMMUNITY
Start: 2021-07-01 | End: 2021-07-28

## 2021-07-01 RX ORDER — MULTIVIT-MIN/IRON/FOLIC ACID/K 18-600-40
1000 CAPSULE ORAL DAILY
COMMUNITY
End: 2023-01-01

## 2021-07-29 NOTE — PROGRESS NOTES
Chief Complaint: Wellness visit    HPI:    Rell Erazo is a 89 year old  (6/5/1928), who is being seen today for an episodic care visit at [facility]. 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)  Right leg weakness  Stage 3a chronic kidney disease  Coronary artery disease involving native coronary artery of native heart without angina pectoris  Encounter for annual wellness exam in Medicare patient      REVIEW OF SYSTEMS:  Unobtainable secondary to cognitive impairment--pt has word salad, smiling  Current Outpatient Medications   Medication Sig Dispense Refill     calcium carbonate (TUMS) 500 MG chewable tablet Take 1 chew tab by mouth 2 times daily (with meals)       LORazepam (ATIVAN) 0.5 MG tablet Take 1 tablet (0.5 mg) by mouth daily as needed for anxiety (Patient taking differently: Take 0.5 mg by mouth daily as needed for anxiety 1 hr before shower, and if patient resistant to bath. Prn) 10 tablet 1     Vitamin D, Cholecalciferol, 25 MCG (1000 UT) TABS Take 1,000 Units by mouth daily         /65   Pulse 65   Temp 98.7  F (37.1  C)   Resp 16   SpO2 96%   GENERAL APPEARANCE:  Alert,oriented x self in no distress. Lungs CTA ,  No  edema. Abdomen is soft. No focal neurological deficits.     ASSESSMENT / PLAN:  (F03.91) Moderate dementia with behavioral disturbance (H)  (primary encounter diagnosis)  (R45.1) Agitation  (I63.89) Cerebral infarction due to other mechanism (H)   (R29.898) Right leg weakness  Comment/Plan: pt walks independently, has some agitation with showers but has not needed ativan--after some initial swearing and resistance he has allowed showering(per staff)    (N18.31) Stage 3a chronic kidney disease   (I25.10) Coronary artery disease involving native coronary artery of native heart without angina pectoris  Comment/Plan: no acute issues--on no meds, recent labs fine for  "BMP    (Z00.00) Encounter for annual wellness exam in Medicare patient  Comment/Plan: see below.      ....................................................................................................................................................................................................  Annual Wellness Visit     Annual Wellness Visit: attempt made during this visit to complete the \"Annual Wellness Visit\" Pt has dementia with word salad and nonsensical conversation. It is not possible to complete this except the following with staff and nursing assistance:       Fall risk:  No falls, per staff    Cognitive Screening: Not appropriate due to known dementia         Current providers sharing in care for this patient include:   Patient Care Team:  Selam Fowler APRN CNP as PCP - General (Internal Medicine)  Indy Fung MA as Medical Assistant  Emerald Talley MD as MD (Internal Medicine)  Selam Fowler APRN CNP as Assigned PCP    Selam BENTLEY. ADRYAN Fowler CNP                   "

## 2021-07-30 ENCOUNTER — ASSISTED LIVING VISIT (OUTPATIENT)
Dept: GERIATRICS | Facility: CLINIC | Age: 86
End: 2021-07-30
Payer: MEDICARE

## 2021-07-30 VITALS
SYSTOLIC BLOOD PRESSURE: 134 MMHG | TEMPERATURE: 98.7 F | HEART RATE: 65 BPM | DIASTOLIC BLOOD PRESSURE: 65 MMHG | OXYGEN SATURATION: 96 % | RESPIRATION RATE: 16 BRPM

## 2021-07-30 DIAGNOSIS — I25.10 CORONARY ARTERY DISEASE INVOLVING NATIVE CORONARY ARTERY OF NATIVE HEART WITHOUT ANGINA PECTORIS: ICD-10-CM

## 2021-07-30 DIAGNOSIS — F03.B18 MODERATE DEMENTIA WITH BEHAVIORAL DISTURBANCE (H): Primary | ICD-10-CM

## 2021-07-30 DIAGNOSIS — N18.31 STAGE 3A CHRONIC KIDNEY DISEASE (H): ICD-10-CM

## 2021-07-30 DIAGNOSIS — R45.1 AGITATION: ICD-10-CM

## 2021-07-30 DIAGNOSIS — R29.898 RIGHT LEG WEAKNESS: ICD-10-CM

## 2021-07-30 DIAGNOSIS — I63.89 CEREBRAL INFARCTION DUE TO OTHER MECHANISM (H): ICD-10-CM

## 2021-07-30 DIAGNOSIS — Z00.00 ENCOUNTER FOR ANNUAL WELLNESS EXAM IN MEDICARE PATIENT: ICD-10-CM

## 2021-07-30 NOTE — LETTER
7/30/2021        RE: Rell Erazo  [facility]  [address] [facility] [address]        Chief Complaint: Wellness visit    HPI:    Rell Erazo is a 89 year old  (6/5/1928), who is being seen today for an episodic care visit at [facility]. 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)  Right leg weakness  Stage 3a chronic kidney disease  Coronary artery disease involving native coronary artery of native heart without angina pectoris  Encounter for annual wellness exam in Medicare patient      REVIEW OF SYSTEMS:  Unobtainable secondary to cognitive impairment--pt has word salad, smiling  Current Outpatient Medications   Medication Sig Dispense Refill     calcium carbonate (TUMS) 500 MG chewable tablet Take 1 chew tab by mouth 2 times daily (with meals)       LORazepam (ATIVAN) 0.5 MG tablet Take 1 tablet (0.5 mg) by mouth daily as needed for anxiety (Patient taking differently: Take 0.5 mg by mouth daily as needed for anxiety 1 hr before shower, and if patient resistant to bath. Prn) 10 tablet 1     Vitamin D, Cholecalciferol, 25 MCG (1000 UT) TABS Take 1,000 Units by mouth daily         /65   Pulse 65   Temp 98.7  F (37.1  C)   Resp 16   SpO2 96%   GENERAL APPEARANCE:  Alert,oriented x self in no distress. Lungs CTA ,  No  edema. Abdomen is soft. No focal neurological deficits.     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      ....................................................................................................................................................................................................  Annual Wellness Visit     Annual Wellness Visit: attempt made during this visit to complete the \"Annual Wellness Visit\" Pt has dementia with word salad and nonsensical conversation. It is not possible to complete this except the following with staff and nursing assistance:       Fall risk:  No falls, per staff    Cognitive Screening: Not appropriate due to known dementia         Current providers sharing in care for this patient include:   Patient Care Team:  Selam Fowler APRN CNP as PCP - General (Internal Medicine)  Indy Fung MA as Medical Assistant  Emerald Talley MD as MD (Internal Medicine)  Selam Fowler APRN CNP as Assigned PCP    ADRYAN Castellanos CNP                         Sincerely,        ADRYAN Castellanos CNP    "

## 2021-08-23 ENCOUNTER — LAB REQUISITION (OUTPATIENT)
Dept: LAB | Facility: CLINIC | Age: 86
End: 2021-08-23
Payer: MEDICARE

## 2021-08-23 DIAGNOSIS — E55.9 VITAMIN D DEFICIENCY, UNSPECIFIED: ICD-10-CM

## 2021-08-24 ENCOUNTER — ASSISTED LIVING VISIT (OUTPATIENT)
Dept: GERIATRICS | Facility: CLINIC | Age: 86
End: 2021-08-24
Payer: MEDICARE

## 2021-08-24 ENCOUNTER — TRANSFERRED RECORDS (OUTPATIENT)
Dept: HEALTH INFORMATION MANAGEMENT | Facility: CLINIC | Age: 86
End: 2021-08-24

## 2021-08-24 VITALS
DIASTOLIC BLOOD PRESSURE: 65 MMHG | RESPIRATION RATE: 16 BRPM | HEART RATE: 65 BPM | OXYGEN SATURATION: 96 % | TEMPERATURE: 98.7 F | SYSTOLIC BLOOD PRESSURE: 134 MMHG

## 2021-08-24 DIAGNOSIS — E55.9 VITAMIN D DEFICIENCY: Primary | ICD-10-CM

## 2021-08-24 LAB — DEPRECATED CALCIDIOL+CALCIFEROL SERPL-MC: 36 UG/L (ref 30–80)

## 2021-08-24 PROCEDURE — 82306 VITAMIN D 25 HYDROXY: CPT | Mod: ORL | Performed by: NURSE PRACTITIONER

## 2021-08-24 PROCEDURE — P9603 ONE-WAY ALLOW PRORATED MILES: HCPCS | Mod: ORL | Performed by: NURSE PRACTITIONER

## 2021-08-24 PROCEDURE — 36415 COLL VENOUS BLD VENIPUNCTURE: CPT | Mod: ORL | Performed by: NURSE PRACTITIONER

## 2021-08-24 NOTE — PROGRESS NOTES
CC: Lab Recheck     HPI:    Rell Erazo is a 89 year old  (6/5/1928), who is being seen today for an episodic care visit at [facility]. Today's concern: lab recheck of Vit D        OBJECTIVE: Alert,NAD.calm, sitting in chair in common room. No focal neurological deficits.      /65   Pulse 65   Temp 98.7  F (37.1  C)   Resp 16   SpO2 96%   Current Outpatient Medications   Medication Sig Dispense Refill     calcium carbonate (TUMS) 500 MG chewable tablet Take 1 chew tab by mouth 2 times daily (with meals)       LORazepam (ATIVAN) 0.5 MG tablet Take 1 tablet (0.5 mg) by mouth daily as needed for anxiety (Patient taking differently: Take 0.5 mg by mouth daily as needed for anxiety 1 hr before shower, and if patient resistant to bath. Prn) 10 tablet 1     Vitamin D, Cholecalciferol, 25 MCG (1000 UT) TABS Take 1,000 Units by mouth daily         LABS: today   8/24/21: vitamin D 36  6/29/21:  VITAMIN D LEVEL 25.9 (WAS 19.5)     ASSESSMENT / PLAN:  (E55.9) Vitamin D deficiency  (primary encounter diagnosis)  Comment: improved,  Plan: cont  Vit D at [place] daily--monitor yearly       Electronically Signed by:    Selam Fowler RN, CNP

## 2021-10-07 ENCOUNTER — ASSISTED LIVING VISIT (OUTPATIENT)
Dept: GERIATRICS | Facility: CLINIC | Age: 86
End: 2021-10-07
Payer: MEDICARE

## 2021-10-07 DIAGNOSIS — I63.89 CEREBRAL INFARCTION DUE TO OTHER MECHANISM (H): ICD-10-CM

## 2021-10-07 DIAGNOSIS — N18.31 STAGE 3A CHRONIC KIDNEY DISEASE (H): ICD-10-CM

## 2021-10-07 DIAGNOSIS — I25.10 CORONARY ARTERY DISEASE INVOLVING NATIVE CORONARY ARTERY OF NATIVE HEART WITHOUT ANGINA PECTORIS: ICD-10-CM

## 2021-10-07 DIAGNOSIS — R29.898 RIGHT LEG WEAKNESS: ICD-10-CM

## 2021-10-07 DIAGNOSIS — F03.B18 MODERATE DEMENTIA WITH BEHAVIORAL DISTURBANCE (H): ICD-10-CM

## 2021-10-07 DIAGNOSIS — E55.9 VITAMIN D DEFICIENCY: ICD-10-CM

## 2021-10-07 DIAGNOSIS — R45.1 AGITATION: ICD-10-CM

## 2021-10-07 DIAGNOSIS — H61.92 LESION OF LEFT EXTERNAL EAR: Primary | ICD-10-CM

## 2021-10-07 PROCEDURE — 99207 PR CDG-MDM COMPONENT: MEETS MODERATE - UP CODED: CPT | Performed by: NURSE PRACTITIONER

## 2021-10-07 NOTE — PROGRESS NOTES
Chief Complaint: episodic, review of pt problems and checking left ear lesion, which staff says conts to bleed with cares    HPI:    Rell Erazo is a 89 year old  (6/5/1928), who is being seen today for an episodic care visit at [facility]. 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)  Right leg weakness  Stage 3a chronic kidney disease (H)  Coronary artery disease involving native coronary artery of native heart without angina pectoris      REVIEW OF SYSTEMS:  Unobtainable secondary to cognitive impairment  Current Outpatient Medications   Medication Sig Dispense Refill     calcium carbonate (TUMS) 500 MG chewable tablet Take 1 chew tab by mouth 2 times daily (with meals)       LORazepam (ATIVAN) 0.5 MG tablet Take 1 tablet (0.5 mg) by mouth daily as needed for anxiety (Patient taking differently: Take 0.5 mg by mouth daily as needed for anxiety 1 hr before shower, and if patient resistant to bath. Prn) 10 tablet 1     Vitamin D, Cholecalciferol, 25 MCG (1000 UT) TABS Take 1,000 Units by mouth daily         /63   Pulse 62   Temp 97.7  F (36.5  C)   Resp 18   Wt 75.2 kg (165 lb 12.8 oz)   SpO2 94%   BMI 25.21 kg/m    GENERAL APPEARANCE:  Alert,oriented x self in no distress. Lungs CTA , S1/S2 without M/G/R  . No  edema. Abdomen is soft, +BTS, walks with walker and self. No focal neurological deficits. Skin: see pic below         Recent Labs   Lab Test 04/27/21  0000 10/04/17  0430    140   POTASSIUM 4.3 4.2   CHLORIDE 103 106   CO2 27 28   ANIONGAP 10 6   * 107*   BUN 26 20   CR 1.48* 1.04   MING 8.9 8.2*    < > = values in this interval not displayed.        ASSESSMENT / PLAN:  (H61.92) Lesion of left external ear: behind left ear  (primary encounter  diagnosis)  Comment/Plan: likely a cancer, will ask staff to make appt with Derm and update family.    (F03.91) Moderate dementia with behavioral disturbance (H)  (R45.1) Agitation  Comment/Plan: has been doing well with cares, allowing showers, keep prn ativan  And monitor.    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 Family wishes to minimize meds, no asa.    (N18.31) Stage 3a chronic kidney disease (H)  Comment/Plan:check yearly or prn      (E55.9) Vitamin D deficiency  Comment: conts to improve  Plan: levels was 25.9  on  6.29.21 after replacements, cont Vit D and Ca++--check in 6 mos        Electronically Signed by:  ADRYAN Castellanos CNP

## 2021-10-07 NOTE — LETTER
10/7/2021        RE: Rell Erazo  [facility]  [address]  [facility] [address]        Chief Complaint: episodic, review of pt problems and checking left ear lesion, which staff says conts to bleed with cares    HPI:    Rell Erazo is a 89 year old  (6/5/1928), who is being seen today for an episodic care visit at [facility]. 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)  Right leg weakness  Stage 3a chronic kidney disease (H)  Coronary artery disease involving native coronary artery of native heart without angina pectoris      REVIEW OF SYSTEMS:  Unobtainable secondary to cognitive impairment  Current Outpatient Medications   Medication Sig Dispense Refill     calcium carbonate (TUMS) 500 MG chewable tablet Take 1 chew tab by mouth 2 times daily (with meals)       LORazepam (ATIVAN) 0.5 MG tablet Take 1 tablet (0.5 mg) by mouth daily as needed for anxiety (Patient taking differently: Take 0.5 mg by mouth daily as needed for anxiety 1 hr before shower, and if patient resistant to bath. Prn) 10 tablet 1     Vitamin D, Cholecalciferol, 25 MCG (1000 UT) TABS Take 1,000 Units by mouth daily         /63   Pulse 62   Temp 97.7  F (36.5  C)   Resp 18   Wt 75.2 kg (165 lb 12.8 oz)   SpO2 94%   BMI 25.21 kg/m    GENERAL APPEARANCE:  Alert,oriented x self in no distress. Lungs CTA , S1/S2 without M/G/R  . No  edema. Abdomen is soft, +BTS, walks with walker and self. No focal neurological deficits. Skin: see pic below         Recent Labs   Lab Test 04/27/21  0000 10/04/17  0430    140   POTASSIUM 4.3 4.2   CHLORIDE 103 106   CO2 27 28   ANIONGAP 10 6   * 107*   BUN 26 20   CR 1.48* 1.04   MING 8.9 8.2*    < > = values in this interval not displayed.         ASSESSMENT / PLAN:  (H61.92) Lesion of left external ear: behind left ear  (primary encounter diagnosis)  Comment/Plan: likely a cancer, will ask staff to make appt with Derm and update family.    (F03.91) Moderate dementia with behavioral disturbance (H)  (R45.1) Agitation  Comment/Plan: has been doing well with cares, allowing showers, keep prn ativan  And monitor.    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 Family wishes to minimize meds, no asa.    (N18.31) Stage 3a chronic kidney disease (H)  Comment/Plan:check yearly or prn      (E55.9) Vitamin D deficiency  Comment: conts to improve  Plan: levels was 25.9  on  6.29.21 after replacements, cont Vit D and Ca++--check in 6 mos        Electronically Signed by:  ADRYAN Castellanos CNP          Sincerely,        ADRYAN Castellanos CNP

## 2021-10-08 VITALS
SYSTOLIC BLOOD PRESSURE: 110 MMHG | BODY MASS INDEX: 25.21 KG/M2 | HEART RATE: 62 BPM | DIASTOLIC BLOOD PRESSURE: 63 MMHG | RESPIRATION RATE: 18 BRPM | WEIGHT: 165.8 LBS | TEMPERATURE: 97.7 F | OXYGEN SATURATION: 94 %

## 2021-10-08 PROBLEM — R45.1 AGITATION: Status: ACTIVE | Noted: 2021-10-08

## 2021-10-08 PROBLEM — H61.92 LESION OF LEFT EXTERNAL EAR: Status: ACTIVE | Noted: 2021-10-08

## 2021-10-08 PROBLEM — F03.B18 MODERATE DEMENTIA WITH BEHAVIORAL DISTURBANCE (H): Status: ACTIVE | Noted: 2021-10-08

## 2021-10-18 ENCOUNTER — TELEPHONE (OUTPATIENT)
Dept: DERMATOLOGY | Facility: CLINIC | Age: 86
End: 2021-10-18

## 2021-10-18 NOTE — TELEPHONE ENCOUNTER
Called and spoke to patients daughter, Selam (POA).    Patient has dementia and resides in a nursing home.    Scheduled next available appointment for growing lesion behind left ear (suspected skin cancer).    **Selam (PRIYANK) will bring patient to appointment     Selam voiced understanding.    Lizet, RN-BSN-PHN  [facility]  [phone]

## 2021-10-18 NOTE — TELEPHONE ENCOUNTER
[facility] Call Center    Phone Message    May a detailed message be left on voicemail: yes     Reason for Call: Other: Pt's daughter said the home where Pt lives had their provider look at the spots behind his ear and said he needs to come see Dr Mora for possible cancer - wants to be seen in person by Dr Abby KIDD in [place] - first opening middle to end of January - Pt's daughter wants you to review records in [facility] and call her for an Appt for her dad that is sooner - I did not want to schedule in January if Pt has cancer so sending TE to review to see if you can see if Pt needs to be seen sooner or not - Thanks - advised daughter someone from Dr Mora's [place] location will review and call back to advise of scheduling     Action Taken: Message routed to:  [facility] DERM    Travel Screening: Not Applicable

## 2021-10-25 NOTE — PROGRESS NOTES
Chief Complaint/HPI:   f/u left arm healed.  Staff reported rash on groin and nystatin doses missed since being started last week--not worse but not better. Is incontinent    HPI:    Rell Erazo is a 89 year old  (6/5/1928), who is being seen today for an episodic care visit at [facility]. 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  Left back of firearm withheld abrasion.  Behind left ear lesion same--moist, scant amt serosang drainage.+groin rash.     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     (R21) Groin rash  Comment/Plan: nystatin, good arjun care, monitor.      Electronically Signed by:  ADRYAN Castellanos CNP

## 2021-10-26 ENCOUNTER — ASSISTED LIVING VISIT (OUTPATIENT)
Dept: GERIATRICS | Facility: CLINIC | Age: 86
End: 2021-10-26
Payer: MEDICARE

## 2021-10-26 VITALS
BODY MASS INDEX: 25.27 KG/M2 | RESPIRATION RATE: 18 BRPM | SYSTOLIC BLOOD PRESSURE: 110 MMHG | OXYGEN SATURATION: 94 % | TEMPERATURE: 97.7 F | DIASTOLIC BLOOD PRESSURE: 63 MMHG | HEART RATE: 62 BPM | WEIGHT: 166.2 LBS

## 2021-10-26 DIAGNOSIS — I63.89 CEREBRAL INFARCTION DUE TO OTHER MECHANISM (H): ICD-10-CM

## 2021-10-26 DIAGNOSIS — F03.B18 MODERATE DEMENTIA WITH BEHAVIORAL DISTURBANCE (H): ICD-10-CM

## 2021-10-26 DIAGNOSIS — H61.92 LESION OF LEFT EXTERNAL EAR: Primary | ICD-10-CM

## 2021-10-26 DIAGNOSIS — R21 GROIN RASH: ICD-10-CM

## 2021-10-26 RX ORDER — NYSTATIN 100000 U/G
CREAM TOPICAL 2 TIMES DAILY
COMMUNITY
Start: 2021-10-26 | End: 2021-11-02

## 2021-10-27 PROBLEM — R21 GROIN RASH: Status: ACTIVE | Noted: 2021-10-27

## 2021-12-02 ENCOUNTER — OFFICE VISIT (OUTPATIENT)
Dept: DERMATOLOGY | Facility: CLINIC | Age: 86
End: 2021-12-02
Payer: MEDICARE

## 2021-12-02 VITALS — OXYGEN SATURATION: 97 % | SYSTOLIC BLOOD PRESSURE: 139 MMHG | DIASTOLIC BLOOD PRESSURE: 69 MMHG | HEART RATE: 74 BPM

## 2021-12-02 DIAGNOSIS — C44.219 BASAL CELL CARCINOMA (BCC) OF LEFT EAR: ICD-10-CM

## 2021-12-02 DIAGNOSIS — L82.1 SEBORRHEIC KERATOSIS: ICD-10-CM

## 2021-12-02 DIAGNOSIS — C44.229 SQUAMOUS CELL CANCER OF SKIN OF HELIX OF LEFT EAR: ICD-10-CM

## 2021-12-02 DIAGNOSIS — D18.01 ANGIOMA OF SKIN: ICD-10-CM

## 2021-12-02 DIAGNOSIS — D23.9 DERMAL NEVUS: ICD-10-CM

## 2021-12-02 DIAGNOSIS — L81.4 LENTIGO: ICD-10-CM

## 2021-12-02 DIAGNOSIS — Z85.828 HISTORY OF SKIN CANCER: Primary | ICD-10-CM

## 2021-12-02 PROCEDURE — 11102 TANGNTL BX SKIN SINGLE LES: CPT | Mod: 59 | Performed by: DERMATOLOGY

## 2021-12-02 PROCEDURE — 11103 TANGNTL BX SKIN EA SEP/ADDL: CPT | Mod: 59 | Performed by: DERMATOLOGY

## 2021-12-02 PROCEDURE — 99203 OFFICE O/P NEW LOW 30 MIN: CPT | Mod: 25 | Performed by: DERMATOLOGY

## 2021-12-02 PROCEDURE — 88331 PATH CONSLTJ SURG 1 BLK 1SPC: CPT | Mod: 59 | Performed by: DERMATOLOGY

## 2021-12-02 PROCEDURE — 17311 MOHS 1 STAGE H/N/HF/G: CPT | Performed by: DERMATOLOGY

## 2021-12-02 NOTE — PATIENT INSTRUCTIONS
Open Wound Care     TOP OF LEFT HEAR AND BEHIND THE LEFT EAR        ? No strenuous activity for 48 hours    ? Take Tylenol as needed for discomfort.                                                .         ? Do not drink alcoholic beverages for 48 hours.    ? Keep the pressure bandage in place for 24 hours. If the bandage becomes blood tinged or loose, reinforce it with gauze and tape.        (Refer to the reverse side of this page for management of bleeding).    ? Remove bandage in 24 hours and begin wound care as follows:     1. Clean area with tap water using a Q tip or gauze pad, (shower / bathe normally)  2. Dry wound with Q tip or gauze pad  3. Apply Aquaphor, Vaseline, Polysporin or Bacitracin Ointment with a Q tip    Do NOT use Neosporin Ointment *  4. Cover the wound with a band-aid or nonstick gauze pad and paper tape.  5. Repeat wound care once a day until wound is completely healed.    It is an old wives tale that a wound heals better when it is exposed to air and allowed to dry out. The wound will heal faster with a better cosmetic result if it is kept moist with ointment and covered with a bandage.  Do not let the wound dry out.      Supplies Needed:                Qtips or gauze pads                Polysporin or Bacitracin Ointment                Bandaids or nonstick gauze pads and paper tape    Wound care kits and brown paper tape are available for purchase at   the pharmacy.    BLEEDIN. Use tightly rolled up gauze or cloth to apply direct pressure over the bandage for 20   minutes.  2. Reapply pressure for an additional 20 minutes if necessary  3. Call the office or go to the nearest emergency room if pressure fails to stop the bleeding.  4. Use additional gauze and tape to maintain pressure once the bleeding has stopped.  5. Begin wound care 24 hours after surgery as directed.                  WOUND HEALING    1. One week after surgery a pink / red halo will form around the outside of the  wound.   This is new skin.  2. The center of the wound will appear yellowish white and produce some drainage.  3. The pink halo will slowly migrate in toward the center of the wound until the wound is covered with new shiny pink skin.  4. There will be no more drainage when the wound is completely healed.  5. It will take six months to one year for the redness to fade.  6. The scar may be itchy, tight and sensitive to extreme temperatures for a year after the surgery.  7. Massaging the area several times a day for several minutes after the wound is completely healed will help the scar soften and normalize faster. Begin massage only after healing is complete.      In case of emergency call: Dr Mora: [phone]    [facility]: [phone]    [facility]:[phone]

## 2021-12-02 NOTE — NURSING NOTE
"Initial There were no vitals taken for this visit. Estimated body mass index is 25.21 kg/m  as calculated from the following:    Height as of 3/22/21: 1.727 m (5' 8\").    Weight as of 10/5/21: 75.2 kg (165 lb 12.8 oz).     VADIM Hinds-BSN-N  [facility]  [phone]  "

## 2021-12-02 NOTE — NURSING NOTE
Surgical Office Location:  [facility]  [address] [place] [id number]

## 2021-12-02 NOTE — PROGRESS NOTES
Rell Erazo is an extremely pleasant 89 year old year old male patient here today for growth on left ear.   .   Patient states this has been present for a while.  Patient reports the following symptoms:  bleeding.  Patient reports the following previous treatments none.  These treatments did not work.  Patient reports the following modifying factors none.  Associated symptoms: none.  Patient has no other skin complaints today.  Remainder of the HPI, Meds, PMH, Allergies, FH, and SH was reviewed in chart.    Pertinent Hx:   Non-melanoma skin cancer   Past Medical History:   Diagnosis Date     ACP (advance care planning) 04/26/2012     BPH (benign prostatic hyperplasia)      CAD (coronary artery disease)     S/P 3-vessel CABG 2012 4/26/2012 s/p CABG 4/24/2012; LIMA to LAd, SV to OM, SV to RCA     Carotid disease, bilateral (H)     Less than 50% stenosis     Chronic atrial fibrillation (H)      CVA (cerebral infarction) 01/01/2013    Small, bifrontal CVA     Diverticulosis of large intestine      Hyperlipidemia LDL goal < 100 04/26/2012    Mixed hyperlipidemia     Kidney stone 1981     Personal history of tobacco use, presenting hazards to Med fusion in 1981     Restless legs syndrome (RLS)      TIA (transient ischemic attack) 05/2013    rt arm weakness       Past Surgical History:   Procedure Laterality Date     BYPASS GRAFT ARTERY CORONARY  04/24/2012    X3 LAD,RCA, obtuse marginal     CARDIAC SURGERY  04/01/2012    x3 CABG        Family History   Problem Relation Age of Onset     Other - See Comments Mother         unknown.     Heart Disease Father         MI at age 61      Heart Disease Brother         CABG in his late 60s        Social History     Socioeconomic History     Marital status:      Spouse name: Not on file     Number of children: Not on file     Years of education: Not on file     Highest education level: Not on file   Occupational History     Not on file   Tobacco Use     Smoking  status: Former Smoker     Packs/day: 0.08     Years: 35.00     Pack years: 2.80     Quit date: 1981     Years since quittin.9     Smokeless tobacco: Never Used   Substance and Sexual Activity     Alcohol use: Yes     Alcohol/week: 7.0 standard drinks     Types: 7 Standard drinks or equivalent per week     Comment: occationally     Drug use: Never     Sexual activity: Not Currently   Other Topics Concern     Parent/sibling w/ CABG, MI or angioplasty before 65F 55M? Not Asked   Social History Narrative     Not on file     Social Determinants of Health     Financial Resource Strain: Not on file   Food Insecurity: Not on file   Transportation Needs: Not on file   Physical Activity: Not on file   Stress: Not on file   Social Connections: Not on file   Intimate Partner Violence: Not on file   Housing Stability: Not on file       Outpatient Encounter Medications as of 2021   Medication Sig Dispense Refill     calcium carbonate (TUMS) 500 MG chewable tablet Take 1 chew tab by mouth 2 times daily (with meals)       LORazepam (ATIVAN) 0.5 MG tablet Take 1 tablet (0.5 mg) by mouth daily as needed for anxiety (Patient taking differently: Take 0.5 mg by mouth daily as needed for anxiety 1 hr before shower, and if patient resistant to bath. Prn) 10 tablet 1     Vitamin D, Cholecalciferol, 25 MCG (1000 UT) TABS Take 1,000 Units by mouth daily       No facility-administered encounter medications on file as of 2021.             O:   NAD, WDWN, Alert & Oriented, Mood & Affect wnl, Vitals stable   Here today with daughter    /69   Pulse 74   SpO2 97%    General appearance normal   Vitals stable   Alert, oriented and in no acute distress      Following lymph nodes palpated: Occipital, Cervical, Supraclavicular no lad   L post helix 5mm tender scaly papule   L post ear 1.4cm pink pearly papule        Stuck on papules and brown macules on trunk and ext   Red papules on neck   Flesh colored papules on  face     The remainder of expanded problem focused exam was normal; the following areas were examined:  scalp/hair, conjunctiva/lids, face, neck, , chest, digits/nails, RUE, LUE.      Eyes: Conjunctivae/lids:Normal     ENT: Lips, buccal mucosa, tongue: normal    MSK:Normal    Cardiovascular: peripheral edema none    Pulm: Breathing Normal    Lymph Nodes: No Head and Neck Lymphadenopathy     Neuro/Psych: Orientation:Alert and Orientedx3 ; Mood/Affect:normal       MICRO:     L post helix (red):There is a proliferation of irregular nests of abnormal squamous cells arising from the epidermis and invading the dermis. These are well differentiated. The dermis shows a variable superficial perivascular inflammatory infiltrate.   L post ear (blue):Orthokeratosis of epidermis with a proliferation of nests of basaloid cells, with peripheral palisading and a haphazard arrangement in the center extending into the dermis, forming nodules.  The tumor cells have hyperchromatic nuclei. Poor cytoplasm and intercellular bridging.    A/P:  1. Seborrheic keratosis, lentigo, angioma, dermal nevus, hx of non-melanoma skin cancer   2. R/o basal cell carcinoma   TANGENTIAL BIOPSY IN HOUSE:  After consent, anesthesia with LEC and prep, tangential excision performed and dx above confirmed with frozen section histology.  No complications and routine wound care.  Patient is not on  anticoagulants and risk of bleeding discussed with patient.       I have personally reviewed all specimens and/or slides and used them with my medical judgement to determine or confirm the final diagnosis.     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      It was a pleasure speaking to Rell Erazo today.  Previous clinic notes and pertinent  laboratory tests were reviewed prior to Rell Erazo's visit.  Nature and genetics of benign skin lesions dicussed with patient.  Signs and Symptoms of skin cancer discussed with patient.  Patient encouraged to perform monthly skin exams.  UV precautions reviewed with patient.  Risks of non-melanoma skin cancer discussed with patient   Return to clinic next appt    PROCEDURE NOTE  L post helix squamous cell carcinoma   MOHS:   Location    The rationale for Mohs surgery was discussed with the patient and consent was obtained.  The risks and benefits as well as alternatives to therapy were discussed, in detail.  Specifically, the risks of infection, scarring, bleeding, prolonged wound healing, incomplete removal, allergy to anesthesia, nerve injury and recurrence were addressed.  Indication for Mohs was Location. Prior to the procedure, the treatment site was clearly identified and, if available, confirmed with previous photos and confirmed by the patient   All components of the Universal Protocol/PAUSE rule were completed.  The Mohs surgeon operated in two distinct and integrated capacities as the surgeon and pathologist.      The area was prepped with Betasept.  A rim of normal appearing skin was marked circumferentially around the lesion.  The area was infiltrated with local anesthesia.  The tumor was first debulked to remove all clinically apparent tumor.  An incision following the standard Mohs approach was done and the specimen was oriented,mapped and placed in 1 block(s).  Each specimen was then chromacoded and processed in the Mohs laboratory using standard Mohs technique and submitted for frozen section histology.  Frozen section analysis showed no residual tumor but CLEAR MARGINS.      The tumor was excised using standard Mohs technique in 1 stages(s).  CLEAR MARGINS OBTAINED and Final defect size was 1.1x1 cm.     We discussed the options for wound management in full with the patient including  risks/benefits/ possible outcomes.        REPAIR SECOND INTENT: We discussed the options for wound management in full with the patient including risks/benefits/possible outcomes. Decision made to allow the wound to heal by second intention. Cautery was used for for hemostasis. EBL minimal; complications none; wound care routine.  The patient was discharged in good condition and will return in one month or prn for wound evaluation.

## 2021-12-02 NOTE — LETTER
12/2/2021         RE: Rell Erazo  [facility]  [address] [facility] [address]        Dear Colleague,    Thank you for referring your patient, Rell Erazo, to the [facility]. Please see a copy of my visit note below.    Rell Erazo is an extremely pleasant 89 year old year old male patient here today for growth on left ear.   .   Patient states this has been present for a while.  Patient reports the following symptoms:  bleeding.  Patient reports the following previous treatments none.  These treatments did not work.  Patient reports the following modifying factors none.  Associated symptoms: none.  Patient has no other skin complaints today.  Remainder of the HPI, Meds, PMH, Allergies, FH, and SH was reviewed in chart.    Pertinent Hx:   Non-melanoma skin cancer   Past Medical History:   Diagnosis Date     ACP (advance care planning) 04/26/2012     BPH (benign prostatic hyperplasia)      CAD (coronary artery disease)     S/P 3-vessel CABG 2012 4/26/2012 s/p CABG 4/24/2012; LIMA to LAd, SV to OM, SV to RCA     Carotid disease, bilateral (H)     Less than 50% stenosis     Chronic atrial fibrillation (H)      CVA (cerebral infarction) 01/01/2013    Small, bifrontal CVA     Diverticulosis of large intestine      Hyperlipidemia LDL goal < 100 04/26/2012    Mixed hyperlipidemia     Kidney stone 1981     Personal history of tobacco use, presenting hazards to health     Quit in 1981     Restless legs syndrome (RLS)      TIA (transient ischemic attack) 05/2013    rt arm weakness       Past Surgical History:   Procedure Laterality Date     BYPASS GRAFT ARTERY CORONARY  04/24/2012    X3 LAD,RCA, obtuse marginal     CARDIAC SURGERY  04/01/2012    x3 CABG        Family History   Problem Relation Age of Onset     Other - See Comments Mother         unknown.     Heart Disease Father         MI at age 61      Heart Disease Brother         CABG in his  late 60s        Social History     Socioeconomic History     Marital status:      Spouse name: Not on file     Number of children: Not on file     Years of education: Not on file     Highest education level: Not on file   Occupational History     Not on file   Tobacco Use     Smoking status: Former Smoker     Packs/day: 0.08     Years: 35.00     Pack years: 2.80     Quit date: 1981     Years since quittin.9     Smokeless tobacco: Never Used   Substance and Sexual Activity     Alcohol use: Yes     Alcohol/week: 7.0 standard drinks     Types: 7 Standard drinks or equivalent per week     Comment: occationally     Drug use: Never     Sexual activity: Not Currently   Other Topics Concern     Parent/sibling w/ CABG, MI or angioplasty before 65F 55M? Not Asked   Social History Narrative     Not on file     Social Determinants of Health     Financial Resource Strain: Not on file   Food Insecurity: Not on file   Transportation Needs: Not on file   Physical Activity: Not on file   Stress: Not on file   Social Connections: Not on file   Intimate Partner Violence: Not on file   Housing Stability: Not on file       Outpatient Encounter Medications as of 2021   Medication Sig Dispense Refill     calcium carbonate (TUMS) 500 MG chewable tablet Take 1 chew tab by mouth 2 times daily (with meals)       LORazepam (ATIVAN) 0.5 MG tablet Take 1 tablet (0.5 mg) by mouth daily as needed for anxiety (Patient taking differently: Take 0.5 mg by mouth daily as needed for anxiety 1 hr before shower, and if patient resistant to bath. Prn) 10 tablet 1     Vitamin D, Cholecalciferol, 25 MCG (1000 UT) TABS Take 1,000 Units by mouth daily       No facility-administered encounter medications on file as of 2021.             O:   NAD, WDWN, Alert & Oriented, Mood & Affect wnl, Vitals stable   Here today with daughter    /69   Pulse 74   SpO2 97%    General appearance normal   Vitals stable   Alert, oriented and in no  acute distress      Following lymph nodes palpated: Occipital, Cervical, Supraclavicular no lad   L post helix 5mm tender scaly papule   L post ear 1.4cm pink pearly papule        Stuck on papules and brown macules on trunk and ext   Red papules on neck   Flesh colored papules on face     The remainder of expanded problem focused exam was normal; the following areas were examined:  scalp/hair, conjunctiva/lids, face, neck, , chest, digits/nails, RUE, LUE.      Eyes: Conjunctivae/lids:Normal     ENT: Lips, buccal mucosa, tongue: normal    MSK:Normal    Cardiovascular: peripheral edema none    Pulm: Breathing Normal    Lymph Nodes: No Head and Neck Lymphadenopathy     Neuro/Psych: Orientation:Alert and Orientedx3 ; Mood/Affect:normal       MICRO:     L post helix (red):There is a proliferation of irregular nests of abnormal squamous cells arising from the epidermis and invading the dermis. These are well differentiated. The dermis shows a variable superficial perivascular inflammatory infiltrate.   L post ear (blue):Orthokeratosis of epidermis with a proliferation of nests of basaloid cells, with peripheral palisading and a haphazard arrangement in the center extending into the dermis, forming nodules.  The tumor cells have hyperchromatic nuclei. Poor cytoplasm and intercellular bridging.    A/P:  1. Seborrheic keratosis, lentigo, angioma, dermal nevus, hx of non-melanoma skin cancer   2. R/o basal cell carcinoma   TANGENTIAL BIOPSY IN HOUSE:  After consent, anesthesia with LEC and prep, tangential excision performed and dx above confirmed with frozen section histology.  No complications and routine wound care.  Patient is not on  anticoagulants and risk of bleeding discussed with patient.       I have personally reviewed all specimens and/or slides and used them with my medical judgement to determine or confirm the final diagnosis.     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      It was a pleasure speaking to Rell Erazo today.  Previous clinic notes and pertinent laboratory tests were reviewed prior to Rell Erazo's visit.  Nature and genetics of benign skin lesions dicussed with patient.  Signs and Symptoms of skin cancer discussed with patient.  Patient encouraged to perform monthly skin exams.  UV precautions reviewed with patient.  Risks of non-melanoma skin cancer discussed with patient   Return to clinic next appt    PROCEDURE NOTE  L post helix squamous cell carcinoma   MOHS:   Location    The rationale for Mohs surgery was discussed with the patient and consent was obtained.  The risks and benefits as well as alternatives to therapy were discussed, in detail.  Specifically, the risks of infection, scarring, bleeding, prolonged wound healing, incomplete removal, allergy to anesthesia, nerve injury and recurrence were addressed.  Indication for Mohs was Location. Prior to the procedure, the treatment site was clearly identified and, if available, confirmed with previous photos and confirmed by the patient   All components of the Universal Protocol/PAUSE rule were completed.  The Mohs surgeon operated in two distinct and integrated capacities as the surgeon and pathologist.      The area was prepped with Betasept.  A rim of normal appearing skin was marked circumferentially around the lesion.  The area was infiltrated with local anesthesia.  The tumor was first debulked to remove all clinically apparent tumor.  An incision following the standard Mohs approach was done and the specimen was oriented,mapped and placed in 1 block(s).  Each specimen was then chromacoded and processed in the Mohs laboratory using standard Mohs technique and submitted for frozen section histology.  Frozen section  analysis showed no residual tumor but CLEAR MARGINS.      The tumor was excised using standard Mohs technique in 1 stages(s).  CLEAR MARGINS OBTAINED and Final defect size was 1.1x1 cm.     We discussed the options for wound management in full with the patient including risks/benefits/ possible outcomes.        REPAIR SECOND INTENT: We discussed the options for wound management in full with the patient including risks/benefits/possible outcomes. Decision made to allow the wound to heal by second intention. Cautery was used for for hemostasis. EBL minimal; complications none; wound care routine.  The patient was discharged in good condition and will return in one month or prn for wound evaluation.        Again, thank you for allowing me to participate in the care of your patient.        Sincerely,        Timmy Mora MD

## 2021-12-08 ENCOUNTER — ASSISTED LIVING VISIT (OUTPATIENT)
Dept: GERIATRICS | Facility: CLINIC | Age: 86
End: 2021-12-08
Payer: MEDICARE

## 2021-12-08 DIAGNOSIS — R21 GROIN RASH: ICD-10-CM

## 2021-12-08 DIAGNOSIS — N18.31 STAGE 3A CHRONIC KIDNEY DISEASE (H): ICD-10-CM

## 2021-12-08 DIAGNOSIS — C44.229 SQUAMOUS CELL CANCER OF EXTERNAL EAR, LEFT: ICD-10-CM

## 2021-12-08 DIAGNOSIS — I63.89 CEREBRAL INFARCTION DUE TO OTHER MECHANISM (H): ICD-10-CM

## 2021-12-08 DIAGNOSIS — F03.B18 MODERATE DEMENTIA WITH BEHAVIORAL DISTURBANCE (H): ICD-10-CM

## 2021-12-08 DIAGNOSIS — H61.92 LESION OF LEFT EXTERNAL EAR: Primary | ICD-10-CM

## 2021-12-08 NOTE — LETTER
12/8/2021        RE: Rell Erazo  [facility]  [address] [facility] [address]        Chief Complaint: see for f/u particularly left ear lesion removal 12/2/21 at Dermatology.    HPI:    Rell Erazo is a 89 year old  (6/5/1928), who is being seen today for an episodic care visit at [facility]. 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 sitting in chair  calm  BP (!) 156/85   Pulse 72   Temp 97.7  F (36.5  C)   Resp 18   SpO2 96%      GENERAL APPEARANCE:  Alert,oriented x self in no distress. Lungs CTA , S1/S2.. No  edema. Abdomen is soft, +BTS. Groin with redness as before off and on due to incontinence..  Behind left ear and on outer mid lobe: dry, healing surgical areas.  No sign infection, being to scab. No focal neurological deficits.     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    (R21) Groin rash  Comment.Plan: nystatin as needed--change pullups often.    (N18.31) Stage 3a chronic kidney disease (H)  Comment/Plan: check yearly and prn as condition warrants      Electronically Signed  by:  ADRYAN Castellanos CNP          Sincerely,        ADRYAN Castellanos CNP

## 2021-12-08 NOTE — PROGRESS NOTES
Chief Complaint: see for f/u particularly left ear lesion removal 12/2/21 at Dermatology.    HPI:    Rell Erazo is a 89 year old  (6/5/1928), who is being seen today for an episodic care visit at [facility]. 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 sitting in chair  calm  BP (!) 156/85   Pulse 72   Temp 97.7  F (36.5  C)   Resp 18   SpO2 96%      GENERAL APPEARANCE:  Alert,oriented x self in no distress. Lungs CTA , S1/S2.. No  edema. Abdomen is soft, +BTS. Groin with redness as before off and on due to incontinence..  Behind left ear and on outer mid lobe: dry, healing surgical areas.  No sign infection, being to scab. No focal neurological deficits.     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    (R21) Groin rash  Comment.Plan: nystatin as needed--change pullups often.    (N18.31) Stage 3a chronic kidney disease (H)  Comment/Plan: check yearly and prn as condition warrants      Electronically Signed by:  ADRYAN Castellanos CNP

## 2021-12-10 VITALS
HEART RATE: 72 BPM | TEMPERATURE: 97.7 F | RESPIRATION RATE: 18 BRPM | DIASTOLIC BLOOD PRESSURE: 85 MMHG | SYSTOLIC BLOOD PRESSURE: 156 MMHG | OXYGEN SATURATION: 96 %

## 2021-12-10 PROBLEM — C44.229: Status: ACTIVE | Noted: 2021-12-10

## 2021-12-22 ENCOUNTER — TELEPHONE (OUTPATIENT)
Dept: GERIATRICS | Facility: CLINIC | Age: 86
End: 2021-12-22
Payer: MEDICARE

## 2021-12-22 NOTE — TELEPHONE ENCOUNTER
[facility] Nurse Triage Telephone Note    Provider: ADRYAN Mckinney CNP    Facility: [facility]  Facility Type:  [place]    Caller: Farzana  Call Back Number: [phone]    Allergies:    Allergies   Allergen Reactions     Cat Hair Extract      Other reaction(s): Other (see comments)  Runny nose        Reason for call: Nurse is calling to report that patient had a fall today around 2:20pm.  The nurse heard the patient fall and went to him to find him lying on his left side.  He was assisted up with the mechanical lift.  He was c/o right lower back pain, but it was coming and going.  Patient has orders for Tylenol 1000mg Q 6 hours PRN and the nurse gave him a dose.  Of note, patient has orders for Ativan 0.5mg prior to his shower, which he had for the first time this morning.  VSS.  Patient stated that he hit his head hard, but didn't say what he hit his head on.  Neuros are per baseline.         Verbal Order/Direction given by Provider: Obtain a x-ray of the lumbar and sacral spine.  Continue to monitor per protocol and update with changes.      Provider giving Order:  Emerald Talley MD    Verbal Order given to: Farzana Tyson RN

## 2021-12-23 ENCOUNTER — TRANSFERRED RECORDS (OUTPATIENT)
Dept: HEALTH INFORMATION MANAGEMENT | Facility: CLINIC | Age: 86
End: 2021-12-23

## 2021-12-23 ENCOUNTER — ASSISTED LIVING VISIT (OUTPATIENT)
Dept: GERIATRICS | Facility: CLINIC | Age: 86
End: 2021-12-23
Payer: MEDICARE

## 2021-12-23 VITALS
OXYGEN SATURATION: 96 % | TEMPERATURE: 97.7 F | DIASTOLIC BLOOD PRESSURE: 85 MMHG | SYSTOLIC BLOOD PRESSURE: 156 MMHG | RESPIRATION RATE: 18 BRPM | HEART RATE: 72 BPM

## 2021-12-23 DIAGNOSIS — I63.9 CEREBRAL INFARCTION, UNSPECIFIED MECHANISM (H): ICD-10-CM
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
## 2021-12-23 DIAGNOSIS — H61.92 LESION OF LEFT EXTERNAL EAR: ICD-10-CM

## 2021-12-23 DIAGNOSIS — N40.0 BENIGN PROSTATIC HYPERPLASIA, UNSPECIFIED WHETHER LOWER URINARY TRACT SYMPTOMS PRESENT: ICD-10-CM

## 2021-12-23 DIAGNOSIS — F03.B18 MODERATE DEMENTIA WITH BEHAVIORAL DISTURBANCE (H): ICD-10-CM

## 2021-12-23 RX ORDER — ACETAMINOPHEN 500 MG
1000 TABLET ORAL 2 TIMES DAILY
COMMUNITY
Start: 2022-01-11 | End: 2022-01-24

## 2021-12-23 NOTE — PROGRESS NOTES
"Chief Complaint: fall yesterday after shower--had to have ativan pre shower to allow shower, per staff      HPI:    Rell Erazo is a 89 year old  (1928), who is being seen today for an episodic care visit at [facility]. Today's concern is:     Fall, initial encounter  Cerebral infarction, unspecified mechanism (H)  Moderate dementia with behavioral disturbance (H)  Benign prostatic hyperplasia, unspecified whether lower urinary tract symptoms present  Lesion of left external ear      TODAY DURING EXAM/ROS:very limited due to dementia.  Said no CP, SOB, pain. But per staff, was grimacing when getting him up today. \"I feel fine,I did not fall, I am ok\".      BP (!) 156/85   Pulse 72   Temp 97.7  F (36.5  C)   Resp 18   SpO2 96%   GENERAL APPEARANCE:  Alert,oriented to self, in no distress. Lungs CTA , S1/S2. No  edema. Abdomen is soft, +BTS. Skin: slight scrapes on upper  Back, spine, no bruising or open areas otherwise.  Left ear, behind ear--healed from MOHS procedure.   No focal neurological deficits.     IMAGIN21: xray's of spine were (-) for acute fractures      ASSESSMENT / PLAN:  (W19.XXXA) Fall, initial encounter  (primary encounter diagnosis)  Comment/Plan: no fractures, add a few days of scheduled Tylenol if he agrees to take it.  Discontinue ativan. Monitor.    (I63.9) Cerebral infarction, unspecified mechanism (H)   (F03.91) Moderate dementia with behavioral disturbance (H)  Comment/Plan: at baseline, monitor., occas resistant to cares.    (N40.0) Benign prostatic hyperplasia, unspecified whether lower urinary tract symptoms present  Comment/Plan: no acute problems, monitor.    (H61.92) Lesion of left external ear: behind left ear  Comment/Plan: healed from MOHS surgery. F/U derm per their recs      Electronically Signed by:  ADRYAN Castellanos CNP    "

## 2021-12-23 NOTE — LETTER
"    2021        RE: Rell Erazo  [facility]  [address]  [facility] [address]        Chief Complaint: fall yesterday after shower--had to have ativan pre shower to allow shower, per staff      HPI:    Rell Erazo is a 89 year old  (1928), who is being seen today for an episodic care visit at [facility]. Today's concern is:     Fall, initial encounter  Cerebral infarction, unspecified mechanism (H)  Moderate dementia with behavioral disturbance (H)  Benign prostatic hyperplasia, unspecified whether lower urinary tract symptoms present  Lesion of left external ear      TODAY DURING EXAM/ROS:very limited due to dementia.  Said no CP, SOB, pain. But per staff, was grimacing when getting him up today. \"I feel fine,I did not fall, I am ok\".      BP (!) 156/85   Pulse 72   Temp 97.7  F (36.5  C)   Resp 18   SpO2 96%   GENERAL APPEARANCE:  Alert,oriented to self, in no distress. Lungs CTA , S1/S2. No  edema. Abdomen is soft, +BTS. Skin: slight scrapes on upper  Back, spine, no bruising or open areas otherwise.  Left ear, behind ear--healed from MOHS procedure.   No focal neurological deficits.     IMAGIN21: xray's of spine were (-) for acute fractures      ASSESSMENT / PLAN:  (W19.XXXA) Fall, initial encounter  (primary encounter diagnosis)  Comment/Plan: no fractures, add a few days of scheduled Tylenol if he agrees to take it.  Discontinue ativan. Monitor.    (I63.9) Cerebral infarction, unspecified mechanism (H)   (F03.91) Moderate dementia with behavioral disturbance (H)  Comment/Plan: at baseline, monitor., occas resistant to cares.    (N40.0) Benign prostatic hyperplasia, unspecified whether lower urinary tract symptoms present  Comment/Plan: no acute problems, monitor.    (H61.92) Lesion of left external ear: behind left ear  Comment/Plan: healed from MOHS surgery. F/U derm per their recs      Electronically Signed " by:  ADRYAN Castellanos CNP          Sincerely,        ADRYAN Castellanos CNP

## 2022-01-11 ENCOUNTER — TRANSFERRED RECORDS (OUTPATIENT)
Dept: HEALTH INFORMATION MANAGEMENT | Facility: CLINIC | Age: 87
End: 2022-01-11

## 2022-01-11 ENCOUNTER — ASSISTED LIVING VISIT (OUTPATIENT)
Dept: GERIATRICS | Facility: CLINIC | Age: 87
End: 2022-01-11
Payer: MEDICARE

## 2022-01-11 VITALS
TEMPERATURE: 97.7 F | SYSTOLIC BLOOD PRESSURE: 156 MMHG | OXYGEN SATURATION: 96 % | HEART RATE: 72 BPM | RESPIRATION RATE: 18 BRPM | DIASTOLIC BLOOD PRESSURE: 85 MMHG

## 2022-01-11 DIAGNOSIS — M25.552 HIP PAIN, LEFT: Primary | ICD-10-CM

## 2022-01-11 DIAGNOSIS — Z91.81 HISTORY OF RECENT FALL: ICD-10-CM

## 2022-01-11 NOTE — PROGRESS NOTES
"Chief Complaint: pt fell before rosalba--xray's of back at that time, showed DJD--no acute issues.  He has C/O pain in left hip when getting up from sitting positions since the wkd.         HPI:    Rell Erazo is a 89 year old  (6/5/1928), who is being seen today for an episodic care visit at [facility]. Today's concern is:     Hip pain, left  History of recent fall       SUBJECTIVE AND OBJECTIVE:  Limited ROS due to dementia.  Said \"ouch\" and pulled away, during exam when left hip area palpated. Appears to walk at his baseline with walker. No pain right hip area with exam.   Alert, in no distress. Lungs CTA ,  No new focal neurological deficits.    BP (!) 156/85   Pulse 72   Temp 97.7  F (36.5  C)   Resp 18   SpO2 96%      ASSESSMENT / PLAN:  (M25.552) Hip pain, left  (primary encounter diagnosis)  (Z91.81) History of recent fall: 12/23/21  Comment/Plan: xray done today--no acute fractures noted---Mild DJD in hip and pelvis.  Add scheduled tylenol for a couple weeks then prn.        Documentation of Face-to-Face and Certification for Home Health Services     Patient: Rell Erazo   YOB: 1928  MR Number: [id number]  Today's Date: 1/21/2022    I certify that patient: Rell Erazo is under my care and that I, or a nurse practitioner or physician's assistant working with me, had a face-to-face encounter that meets the physician face-to-face encounter requirements with this patient on: 1/11/2022.    This encounter with the patient was in whole, or in part, for the following medical condition, which is the primary reason for home health care:      Hip pain, left  History of recent fall  .    I certify that, based on my findings, the following services are medically necessary home health services: Physical Therapy.    My clinical findings support the need for the above services because: Physical Therapy Services are needed to assess and treat the " following functional impairments: weakness, left leg sore from fall, .    Further, I certify that my clinical findings support that this patient is homebound (i.e. absences from home require considerable and taxing effort and are for medical reasons or Religion services or infrequently or of short duration when for other reasons) because: Requires assistance of another person or specialized equipment to access medical services because patient: Requires supervision of another for safe transfer. and  has been choosing WC rather than walking due to pain..    Based on the above findings. I certify that this patient is confined to the home and needs intermittent skilled nursing care, physical therapy and/or speech therapy.  The patient is under my care, and I have initiated the establishment of the plan of care.  This patient will be followed by a physician who will periodically review the plan of care.  Physician/Provider to provide follow up care: Selam Fowler Medicare certified PECOS Physician: monica fowler RN, CNP   Physician Signature: See electronic signature associated with these discharge orders.  Date: 1/21/2022    Electronically Signed by:  ADRYAN Castellanos CNP

## 2022-01-11 NOTE — LETTER
"    1/11/2022        RE: Rell Erazo  [facility]  [address]  [facility] [address]        Chief Complaint: pt fell before rosalba--xray's of back at that time, showed DJD--no acute issues.  He has C/O pain in left hip when getting up from sitting positions since the wkd.         HPI:    Rell Erazo is a 89 year old  (6/5/1928), who is being seen today for an episodic care visit at [facility]. Today's concern is:     Hip pain, left  History of recent fall       SUBJECTIVE AND OBJECTIVE:  Limited ROS due to dementia.  Said \"ouch\" and pulled away, during exam when left hip area palpated. Appears to walk at his baseline with walker. No pain right hip area with exam.   Alert, in no distress. Lungs CTA ,  No new focal neurological deficits.    BP (!) 156/85   Pulse 72   Temp 97.7  F (36.5  C)   Resp 18   SpO2 96%      ASSESSMENT / PLAN:  (M25.552) Hip pain, left  (primary encounter diagnosis)  (Z91.81) History of recent fall: 12/23/21  Comment/Plan: xray done today--no acute fractures noted---Mild DJD in hip and pelvis.  Add scheduled tylenol for a couple weeks then prn.      Electronically Signed by:  ADRYAN Castellanos CNP          Sincerely,        ADRYAN Castellanos CNP    "

## 2022-01-20 ENCOUNTER — ASSISTED LIVING VISIT (OUTPATIENT)
Dept: GERIATRICS | Facility: CLINIC | Age: 87
End: 2022-01-20
Payer: MEDICARE

## 2022-01-20 VITALS
SYSTOLIC BLOOD PRESSURE: 156 MMHG | DIASTOLIC BLOOD PRESSURE: 85 MMHG | OXYGEN SATURATION: 96 % | HEART RATE: 72 BPM | HEIGHT: 68 IN | RESPIRATION RATE: 18 BRPM | BODY MASS INDEX: 25.13 KG/M2 | TEMPERATURE: 97.7 F | WEIGHT: 165.8 LBS

## 2022-01-20 DIAGNOSIS — M25.552 HIP PAIN, LEFT: Primary | ICD-10-CM

## 2022-01-20 DIAGNOSIS — I63.89 CEREBRAL INFARCTION DUE TO OTHER MECHANISM (H): ICD-10-CM

## 2022-01-20 DIAGNOSIS — E55.9 VITAMIN D DEFICIENCY: ICD-10-CM

## 2022-01-20 DIAGNOSIS — F03.B0 MODERATE DEMENTIA WITHOUT BEHAVIORAL DISTURBANCE (H): ICD-10-CM

## 2022-01-20 DIAGNOSIS — N18.31 STAGE 3A CHRONIC KIDNEY DISEASE (H): ICD-10-CM

## 2022-01-20 DIAGNOSIS — M15.0 PRIMARY OSTEOARTHRITIS INVOLVING MULTIPLE JOINTS: ICD-10-CM

## 2022-01-20 ASSESSMENT — MIFFLIN-ST. JEOR
SCORE: 1371.56
SCORE: 1367.94

## 2022-01-20 NOTE — LETTER
1/20/2022        RE: Rell Erazo  [facility]  [address]  [facility] [address]        Rell Erazo is a 89 year old male seen January 20, 2022 at [facility] unit where he has resided for 9 months (admit 4/2021)   Pt is seen on the unit up to chair.   Earlier this month had left hip and LE pain, but today he adamantly denies that.   He stands to walk with his FWW, flexed at hips and knees with very slow gait, but continues to deny any pain.         By chart review, pt had been living in the community with his daughters providing care, but memory concerns worsening and he needed more support, so [place] moved to [place].   [facility]   He has CAD with h/o CABG in 2012, and cerebral vascular ds with h/o stroke in 2014.  He had a fall with ICH in 2017 while on warfarin +ASA.   He had worsening of his baseline dementia since that time.  At some point he stopped all medications and admitted to [place] without any scheduled meds.  Earlier this year he was started on vit D for level of 19.      Past Medical History:   Diagnosis Date     ACP (advance care planning) 04/26/2012     BPH (benign prostatic hyperplasia)      CAD (coronary artery disease)     S/P 3-vessel CABG 2012 4/26/2012 s/p CABG 4/24/2012; LIMA to LAd, SV to OM, SV to RCA     Carotid disease, bilateral (H)     Less than 50% stenosis     Chronic atrial fibrillation (H)      CVA (cerebral infarction) 01/01/2013    Small, bifrontal CVA     Diverticulosis of large intestine      Hyperlipidemia LDL goal < 100 04/26/2012    Mixed hyperlipidemia     Kidney stone 1981     Personal history of tobacco use, presenting hazards to health     Quit in 1981     Restless legs syndrome (RLS)      TIA (transient ischemic attack) 05/2013    rt arm weakness       Past Surgical History:   Procedure Laterality Date     BYPASS GRAFT ARTERY CORONARY  04/24/2012    X3 LAD,RCA, obtuse  "marginal     CARDIAC SURGERY  04/01/2012    x3 CABG     SH:   2018, has 2 daughters.    Previously cared for at home by his daughters.    Attended [facility].   Past smoker      ROS:  SLUMS 20/30   Safety questionnaire 7.5/27 in 2017    Wt Readings from Last 5 Encounters:   01/20/22 75.2 kg (165 lb 12.8 oz)   09/01/21 75.4 kg (166 lb 3.2 oz)   10/05/21 75.2 kg (165 lb 12.8 oz)   06/08/21 71.9 kg (158 lb 8 oz)   05/12/21 72.6 kg (160 lb)      EXAM:  NAD  BP (!) 156/85   Pulse 72   Temp 97.7  F (36.5  C)   Resp 18   Ht 1.727 m (5' 8\")   Wt 75.2 kg (165 lb 12.8 oz)   SpO2 96%   BMI 25.21 kg/m     Neck supple without adenopathy  Lungs clear bilaterally  Heart RRR s1s2, +ectopy  Abd soft, NT, no distention or guarding, +BS  Ext without edema, flexion at hips and knees when standing  Neuro: limited history, RLE weakness  Psych: affect a little edgy    Labs reviewed       IMP/PLAN:   (M25.552) Hip pain, left    (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  Comment: impaired gait, with flexion at knees and hips   X-ray negative for acute pathology, but + degenerative changes  Plan: scheduled acetaminophen bid   Physical Therapy referral for gait, balance, transfers    (N18.31) Stage 3a chronic kidney disease    Comment: GFR 44   Plan: Renal dosing of medications, follow BMP      (I25.10) Coronary artery disease involving native coronary artery of native heart without angina pectoris  Comment: s/p CABG  Plan: no current medications, monitor for symptoms, consider adding prn NTG     (I63.89) Cerebral infarction due to other mechanism (H)  (F03.90) Moderate dementia without behavioral disturbance (H)  Comment: may be mixed Alzheimer's /vascular dementia worsening after ICH  Plan: [place] [facility] unit support for med admin, meals, activity, secure unit       (E55.9) Vitamin D deficiency  Comment: Remains ambulatory   Plan: vit D replacement, dietary calcium     Emerald Talley MD "           Sincerely,        Emerald Talley MD

## 2022-01-27 PROBLEM — I25.119 CORONARY ARTERY DISEASE INVOLVING NATIVE HEART WITH ANGINA PECTORIS, UNSPECIFIED VESSEL OR LESION TYPE (H): Status: RESOLVED | Noted: 2017-10-09 | Resolved: 2022-01-27

## 2022-01-27 PROBLEM — F03.B18 MODERATE DEMENTIA WITH BEHAVIORAL DISTURBANCE (H): Status: RESOLVED | Noted: 2021-10-08 | Resolved: 2022-01-27

## 2022-01-28 NOTE — PROGRESS NOTES
Rell Erazo is a 89 year old male seen January 20, 2022 at [facility] unit where he has resided for 9 months (admit 4/2021)   Pt is seen on the unit up to chair.   Earlier this month had left hip and LE pain, but today he adamantly denies that.   He stands to walk with his FWW, flexed at hips and knees with very slow gait, but continues to deny any pain.         By chart review, pt had been living in the community with his daughters providing care, but memory concerns worsening and he needed more support, so has moved to [place].   [facility]   He has CAD with h/o CABG in 2012, and cerebral vascular ds with h/o stroke in 2014.  He had a fall with ICH in 2017 while on warfarin +ASA.   He had worsening of his baseline dementia since that time.  At some point he stopped all medications and admitted to [place] without any scheduled meds.  Earlier this year he was started on vit D for level of 19.      Past Medical History:   Diagnosis Date     ACP (advance care planning) 04/26/2012     BPH (benign prostatic hyperplasia)      CAD (coronary artery disease)     S/P 3-vessel CABG 2012 4/26/2012 s/p CABG 4/24/2012; LIMA to LAd, SV to OM, SV to RCA     Carotid disease, bilateral (H)     Less than 50% stenosis     Chronic atrial fibrillation (H)      CVA (cerebral infarction) 01/01/2013    Small, bifrontal CVA     Diverticulosis of large intestine      Hyperlipidemia LDL goal < 100 04/26/2012    Mixed hyperlipidemia     Kidney stone 1981     Personal history of tobacco use, presenting hazards to health     Quit in 1981     Restless legs syndrome (RLS)      TIA (transient ischemic attack) 05/2013    rt arm weakness       Past Surgical History:   Procedure Laterality Date     BYPASS GRAFT ARTERY CORONARY  04/24/2012    X3 LAD,RCA, obtuse marginal     CARDIAC SURGERY  04/01/2012    x3 CABG     SH:   2018, has 2 daughters.    Previously cared for at home by  "his daughters.    Attended [employer] [facility].   Past smoker      ROS:  SLUMS 20/30   Safety questionnaire 7.5/27 in 2017    Wt Readings from Last 5 Encounters:   01/20/22 75.2 kg (165 lb 12.8 oz)   09/01/21 75.4 kg (166 lb 3.2 oz)   10/05/21 75.2 kg (165 lb 12.8 oz)   06/08/21 71.9 kg (158 lb 8 oz)   05/12/21 72.6 kg (160 lb)      EXAM:  NAD  BP (!) 156/85   Pulse 72   Temp 97.7  F (36.5  C)   Resp 18   Ht 1.727 m (5' 8\")   Wt 75.2 kg (165 lb 12.8 oz)   SpO2 96%   BMI 25.21 kg/m     Neck supple without adenopathy  Lungs clear bilaterally  Heart RRR s1s2, +ectopy  Abd soft, NT, no distention or guarding, +BS  Ext without edema, flexion at hips and knees when standing  Neuro: limited history, RLE weakness  Psych: affect a little edgy    Labs reviewed       IMP/PLAN:   (M25.552) Hip pain, left    (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  Comment: impaired gait, with flexion at knees and hips   X-ray negative for acute pathology, but + degenerative changes  Plan: scheduled acetaminophen bid   Physical Therapy referral for gait, balance, transfers    (N18.31) Stage 3a chronic kidney disease    Comment: GFR 44   Plan: Renal dosing of medications, follow BMP      (I25.10) Coronary artery disease involving native coronary artery of native heart without angina pectoris  Comment: s/p CABG  Plan: no current medications, monitor for symptoms, consider adding prn NTG     (I63.89) Cerebral infarction due to other mechanism (H)  (F03.90) Moderate dementia without behavioral disturbance (H)  Comment: 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

## 2022-02-07 ENCOUNTER — LAB REQUISITION (OUTPATIENT)
Dept: LAB | Facility: CLINIC | Age: 87
End: 2022-02-07
Payer: MEDICARE

## 2022-02-07 DIAGNOSIS — D64.9 ANEMIA, UNSPECIFIED: ICD-10-CM

## 2022-02-07 DIAGNOSIS — R53.83 OTHER FATIGUE: ICD-10-CM

## 2022-02-07 DIAGNOSIS — I10 ESSENTIAL (PRIMARY) HYPERTENSION: ICD-10-CM

## 2022-02-07 NOTE — PROGRESS NOTES
"Chief Complaint: weaker and yest staff noted difficulty getting him up, lethargic and appeared to have a left facial droop and left sided weakness transiently.  This am is better but still needing more help with tranfers, wanted to sleep in later today    HPI:    Rell Erazo is a 89 year old  (6/5/1928), who is being seen today for an episodic care visit at [facility]. Today's concern is:     Moderate dementia without behavioral disturbance (H)  Failure to thrive in adult  Generalized muscle weakness  TIA (transient ischemic attack)  Traumatic hemorrhage of left cerebrum without loss of consciousness, subsequent encounter  History of recent fall  Primary osteoarthritis involving multiple joints      REVIEW OF SYSTEMS:  He  said \"No \" to pain, otherwise opened eyes to questions but no verbal responses.  Current Outpatient Medications   Medication Sig Dispense Refill     acetaminophen (TYLENOL) 500 MG tablet Take 1,000 mg by mouth 2 times daily OA       ACE/ARB/ARNI NOT PRESCRIBED (INTENTIONAL) Please choose reason not prescribed from choices below.       calcium carbonate (TUMS) 500 MG chewable tablet Take 1 chew tab by mouth 2 times daily (with meals)       Vitamin D, Cholecalciferol, 25 MCG (1000 UT) TABS Take 1,000 Units by mouth daily         /55   Pulse 65   Resp 18   Ht 1.727 m (5' 8\")   Wt 72.1 kg (159 lb)   SpO2 96%   BMI 24.18 kg/m    GENERAL APPEARANCE:  sleepy,oriented x self in no distress. Lungs CTA , S1/S2 without M/G/R  . No  edema. Abdomen is soft, +BTS . No focal neurological deficits.   except transient delay in left . (**With Transfers and staff, weaker at times**)    Recent Labs   Lab Test 02/08/22  0729 04/27/21  0902    140   POTASSIUM 4.1 4.3   CHLORIDE 106 103   CO2 25 27   ANIONGAP 9 10   GLC 91 128*   BUN 21 26   CR 1.31* 1.48*   MING 8.8 8.9     CBC RESULTS: Recent Labs   Lab Test 02/08/22  0729   WBC 5.2   RBC 3.78*   HGB 12.0* "   HCT 35.5*   MCV 94   MCH 31.7   MCHC 33.8   RDW 12.5              ASSESSMENT / PLAN:  (F03.90) Moderate dementia without behavioral disturbance (H)  (primary encounter diagnosis)   (R62.7) Failure to thrive in adult  Comment/Plan: slow wt loss, less mobile, monitor, focus on comfort    (M62.81) Generalized muscle weakness   (G45.9) TIA (transient ischemic attack)   (S06.350D) Traumatic hemorrhage of left cerebrum without loss of consciousness, subsequent encounter  Comment/Plan: appears to have had a TIA, on no asa --goal is comfort based      (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  Comment/Plan: restart tylenol scheduled. monitor.    Total time with patient visit: >30 minutes including discussions about the POC and care coordination with the family, daughter Selam. Greater than 50% of total time spent with counseling and coordinating care due to d/w Alvin re likely TIA, plan to keep on Tylenol for any pain.  Relayed info on labs.  Alvin agreed on plan of care--goal is comfort based.    Electronically Signed by:  ADRYAN Castellanos CNP

## 2022-02-08 ENCOUNTER — ASSISTED LIVING VISIT (OUTPATIENT)
Dept: GERIATRICS | Facility: CLINIC | Age: 87
End: 2022-02-08
Payer: MEDICARE

## 2022-02-08 VITALS
HEART RATE: 65 BPM | WEIGHT: 159 LBS | RESPIRATION RATE: 18 BRPM | HEIGHT: 68 IN | SYSTOLIC BLOOD PRESSURE: 107 MMHG | BODY MASS INDEX: 24.1 KG/M2 | DIASTOLIC BLOOD PRESSURE: 55 MMHG | OXYGEN SATURATION: 96 %

## 2022-02-08 DIAGNOSIS — G45.9 TIA (TRANSIENT ISCHEMIC ATTACK): ICD-10-CM

## 2022-02-08 DIAGNOSIS — M62.81 GENERALIZED MUSCLE WEAKNESS: ICD-10-CM

## 2022-02-08 DIAGNOSIS — R62.7 FAILURE TO THRIVE IN ADULT: ICD-10-CM

## 2022-02-08 DIAGNOSIS — Z91.81 HISTORY OF RECENT FALL: ICD-10-CM

## 2022-02-08 DIAGNOSIS — F03.B0 MODERATE DEMENTIA WITHOUT BEHAVIORAL DISTURBANCE (H): Primary | ICD-10-CM

## 2022-02-08 DIAGNOSIS — M15.0 PRIMARY OSTEOARTHRITIS INVOLVING MULTIPLE JOINTS: ICD-10-CM

## 2022-02-08 DIAGNOSIS — S06.350D TRAUMATIC HEMORRHAGE OF LEFT CEREBRUM WITHOUT LOSS OF CONSCIOUSNESS, SUBSEQUENT ENCOUNTER: ICD-10-CM

## 2022-02-08 LAB
ALBUMIN SERPL-MCNC: 3.3 G/DL (ref 3.5–5)
ALP SERPL-CCNC: 63 U/L (ref 45–120)
ALT SERPL W P-5'-P-CCNC: 16 U/L (ref 0–45)
ANION GAP SERPL CALCULATED.3IONS-SCNC: 9 MMOL/L (ref 5–18)
AST SERPL W P-5'-P-CCNC: 29 U/L (ref 0–40)
BASOPHILS # BLD AUTO: 0 10E3/UL (ref 0–0.2)
BASOPHILS NFR BLD AUTO: 0 %
BILIRUB SERPL-MCNC: 0.6 MG/DL (ref 0–1)
BUN SERPL-MCNC: 21 MG/DL (ref 8–28)
CALCIUM SERPL-MCNC: 8.8 MG/DL (ref 8.5–10.5)
CHLORIDE BLD-SCNC: 106 MMOL/L (ref 98–107)
CO2 SERPL-SCNC: 25 MMOL/L (ref 22–31)
CREAT SERPL-MCNC: 1.31 MG/DL (ref 0.7–1.3)
EOSINOPHIL # BLD AUTO: 0.2 10E3/UL (ref 0–0.7)
EOSINOPHIL NFR BLD AUTO: 4 %
ERYTHROCYTE [DISTWIDTH] IN BLOOD BY AUTOMATED COUNT: 12.5 % (ref 10–15)
GFR SERPL CREATININE-BSD FRML MDRD: 51 ML/MIN/1.73M2
GLUCOSE BLD-MCNC: 91 MG/DL (ref 70–125)
HCT VFR BLD AUTO: 35.5 % (ref 40–53)
HGB BLD-MCNC: 12 G/DL (ref 13.3–17.7)
IMM GRANULOCYTES # BLD: 0 10E3/UL
IMM GRANULOCYTES NFR BLD: 0 %
LYMPHOCYTES # BLD AUTO: 0.8 10E3/UL (ref 0.8–5.3)
LYMPHOCYTES NFR BLD AUTO: 15 %
MCH RBC QN AUTO: 31.7 PG (ref 26.5–33)
MCHC RBC AUTO-ENTMCNC: 33.8 G/DL (ref 31.5–36.5)
MCV RBC AUTO: 94 FL (ref 78–100)
MONOCYTES # BLD AUTO: 0.7 10E3/UL (ref 0–1.3)
MONOCYTES NFR BLD AUTO: 14 %
NEUTROPHILS # BLD AUTO: 3.5 10E3/UL (ref 1.6–8.3)
NEUTROPHILS NFR BLD AUTO: 67 %
NRBC # BLD AUTO: 0 10E3/UL
NRBC BLD AUTO-RTO: 0 /100
PLATELET # BLD AUTO: 236 10E3/UL (ref 150–450)
POTASSIUM BLD-SCNC: 4.1 MMOL/L (ref 3.5–5)
PROT SERPL-MCNC: 5.8 G/DL (ref 6–8)
RBC # BLD AUTO: 3.78 10E6/UL (ref 4.4–5.9)
SODIUM SERPL-SCNC: 140 MMOL/L (ref 136–145)
WBC # BLD AUTO: 5.2 10E3/UL (ref 4–11)

## 2022-02-08 PROCEDURE — P9604 ONE-WAY ALLOW PRORATED TRIP: HCPCS | Mod: ORL | Performed by: NURSE PRACTITIONER

## 2022-02-08 PROCEDURE — 80053 COMPREHEN METABOLIC PANEL: CPT | Mod: ORL | Performed by: NURSE PRACTITIONER

## 2022-02-08 PROCEDURE — 85025 COMPLETE CBC W/AUTO DIFF WBC: CPT | Mod: ORL | Performed by: NURSE PRACTITIONER

## 2022-02-08 PROCEDURE — 36415 COLL VENOUS BLD VENIPUNCTURE: CPT | Mod: ORL | Performed by: NURSE PRACTITIONER

## 2022-02-08 RX ORDER — ACETAMINOPHEN 500 MG
1000 TABLET ORAL 2 TIMES DAILY
COMMUNITY
Start: 2022-02-07 | End: 2023-01-01

## 2022-02-08 ASSESSMENT — MIFFLIN-ST. JEOR: SCORE: 1340.72

## 2022-02-08 NOTE — LETTER
"    2/8/2022        RE: Rell Erazo  [facility]  [address]  [facility] [address]        Chief Complaint: weaker and yest staff noted difficulty getting him up, lethargic and appeared to have a left facial droop and left sided weakness transiently.  This am is better but still needing more help with tranfers, wanted to sleep in later today    HPI:    Rell Erazo is a 89 year old  (6/5/1928), who is being [facility] today [facility]. Today's concern is:     Moderate dementia without behavioral disturbance (H)  Failure to thrive in adult  Generalized muscle weakness  TIA (transient ischemic attack)  Traumatic hemorrhage of left cerebrum without loss of consciousness, subsequent encounter  History of recent fall  Primary osteoarthritis involving multiple joints      REVIEW OF SYSTEMS:  He  said \"No \" to pain, otherwise opened eyes to questions but no verbal responses.  Current Outpatient Medications   Medication Sig Dispense Refill     acetaminophen (TYLENOL) 500 MG tablet Take 1,000 mg by mouth 2 times daily OA       ACE/ARB/ARNI NOT PRESCRIBED (INTENTIONAL) Please choose reason not prescribed from choices below.       calcium carbonate (TUMS) 500 MG chewable tablet Take 1 chew tab by mouth 2 times daily (with meals)       Vitamin D, Cholecalciferol, 25 MCG (1000 UT) TABS Take 1,000 Units by mouth daily         /55   Pulse 65   Resp 18   Ht 1.727 m (5' 8\")   Wt 72.1 kg (159 lb)   SpO2 96%   BMI 24.18 kg/m    GENERAL APPEARANCE:  sleepy,oriented x self in no distress. Lungs CTA , S1/S2 without M/G/R  . No  edema. Abdomen is soft, +BTS . No focal neurological deficits.   except transient delay in left . (**With Transfers and staff, weaker at times**)    Recent Labs   Lab Test 02/08/22  0729 04/27/21  0902    140   POTASSIUM 4.1 4.3   CHLORIDE 106 103   CO2 25 27   ANIONGAP 9 10   GLC 91 128*   BUN 21 26 "   CR 1.31* 1.48*   MING 8.8 8.9     CBC RESULTS: Recent Labs   Lab Test 02/08/22  0729   WBC 5.2   RBC 3.78*   HGB 12.0*   HCT 35.5*   MCV 94   MCH 31.7   MCHC 33.8   RDW 12.5              ASSESSMENT / PLAN:  (F03.90) Moderate dementia without behavioral disturbance (H)  (primary encounter diagnosis)   (R62.7) Failure to thrive in adult  Comment/Plan: slow wt loss, less mobile, monitor, focus on comfort    (M62.81) Generalized muscle weakness   (G45.9) TIA (transient ischemic attack)   (S06.350D) Traumatic hemorrhage of left cerebrum without loss of consciousness, subsequent encounter  Comment/Plan: appears to have had a TIA, on no asa --goal is comfort based      (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  Comment/Plan: restart tylenol scheduled. monitor.    Total time with patient visit: >30 minutes including discussions about the POC and care coordination with the family, daughter Selam. Greater than 50% of total time spent with counseling and coordinating care due to d/w Alvin re likely TIA, plan to keep on Tylenol for any pain.  Relayed info on labs.  Alvin agreed on plan of care--goal is comfort based.    Electronically Signed by:  ADRYAN Castellanos CNP          Sincerely,        ADRYAN Castellanos CNP

## 2022-02-24 ENCOUNTER — ASSISTED LIVING VISIT (OUTPATIENT)
Dept: GERIATRICS | Facility: CLINIC | Age: 87
End: 2022-02-24
Payer: MEDICARE

## 2022-02-24 VITALS
BODY MASS INDEX: 24.1 KG/M2 | RESPIRATION RATE: 18 BRPM | TEMPERATURE: 97.7 F | DIASTOLIC BLOOD PRESSURE: 55 MMHG | HEART RATE: 65 BPM | WEIGHT: 159 LBS | SYSTOLIC BLOOD PRESSURE: 107 MMHG | OXYGEN SATURATION: 96 % | HEIGHT: 68 IN

## 2022-02-24 DIAGNOSIS — S06.350D TRAUMATIC HEMORRHAGE OF LEFT CEREBRUM WITHOUT LOSS OF CONSCIOUSNESS, SUBSEQUENT ENCOUNTER: ICD-10-CM

## 2022-02-24 DIAGNOSIS — N40.1 BENIGN PROSTATIC HYPERPLASIA WITH URINARY FREQUENCY: Primary | ICD-10-CM

## 2022-02-24 DIAGNOSIS — F03.B0 MODERATE DEMENTIA WITHOUT BEHAVIORAL DISTURBANCE (H): ICD-10-CM

## 2022-02-24 DIAGNOSIS — G45.9 TIA (TRANSIENT ISCHEMIC ATTACK): ICD-10-CM

## 2022-02-24 DIAGNOSIS — Z00.00 ROUTINE GENERAL MEDICAL EXAMINATION AT A HEALTH CARE FACILITY: ICD-10-CM

## 2022-02-24 DIAGNOSIS — M62.81 GENERALIZED MUSCLE WEAKNESS: ICD-10-CM

## 2022-02-24 DIAGNOSIS — R29.898 TRANSIENT WEAKNESS OF LEFT LEG: ICD-10-CM

## 2022-02-24 DIAGNOSIS — R29.6 FREQUENT FALLS: ICD-10-CM

## 2022-02-24 DIAGNOSIS — M15.0 PRIMARY OSTEOARTHRITIS INVOLVING MULTIPLE JOINTS: ICD-10-CM

## 2022-02-24 DIAGNOSIS — R29.898 RIGHT LEG WEAKNESS: ICD-10-CM

## 2022-02-24 DIAGNOSIS — R35.0 BENIGN PROSTATIC HYPERPLASIA WITH URINARY FREQUENCY: Primary | ICD-10-CM

## 2022-02-24 PROCEDURE — 99207 PR NO CHARGE LOS: CPT | Performed by: NURSE PRACTITIONER

## 2022-02-24 PROCEDURE — 99207 PR CDG-CODE CATEGORY CHANGED: CPT | Performed by: NURSE PRACTITIONER

## 2022-02-24 RX ORDER — TAMSULOSIN HYDROCHLORIDE 0.4 MG/1
0.4 CAPSULE ORAL EVERY EVENING
COMMUNITY
Start: 2022-02-24 | End: 2023-01-01

## 2022-02-24 NOTE — PROGRESS NOTES
[facility]  Chief Complaint   Patient presents with     Annual Comprehensive Nursing Home     [facility] Medical Record Number:  [id number]  Place of Service where encounter took place:  [facility] ([facility] [[id number]]    HPI:    Rell Erazo  is a 89 year old  (6/5/1928), who is being seen today for an annual comprehensive visit. HPI information obtained from: facility chart records, facility staff and [facility] Epic chart review.  Today's concerns are:  Pt has been trying to get up often and trying to go to bathroom.  He is incontinent at times also.  When staff tries to stand him, he often is weaker on left side. He is also incont often at night.      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, subsequent encounter  Right leg weakness  TIA (transient ischemic attack)  Transient weakness of left leg  Frequent falls  Generalized muscle weakness  Primary osteoarthritis involving multiple joints  Routine general medical examination at a health care facility     HTN--none    ALLERGIES: Cat hair extract  PAST MEDICAL HISTORY:  has a past medical history of ACP (advance care planning) (04/26/2012), BPH (benign prostatic hyperplasia), CAD (coronary artery disease), Carotid disease, bilateral (H), Chronic atrial fibrillation (H), CVA (cerebral infarction) (01/01/2013), Diverticulosis of large intestine, Hyperlipidemia LDL goal < 100 (04/26/2012), Kidney stone (1981), Personal history of tobacco use, presenting hazards to health, Restless legs syndrome (RLS), and TIA (transient ischemic attack) (05/2013).    He has no past medical history of Basal cell carcinoma, Malignant melanoma (H), or Squamous cell carcinoma of skin, unspecified.  PAST SURGICAL HISTORY:  has a past surgical history that includes Cardiac surgery (04/01/2012) and Bypass graft  artery coronary (04/24/2012).  IMMUNIZATIONS:  Immunization History   Administered Date(s) Administered     COVID-19,PF,Moderna 11/09/2021     COVID-19,PF,Pfizer (12+ Yrs) 01/19/2021, 02/09/2021     FLUAD(HD)65+ QUAD 09/15/2021     Flu 65+ Years 09/17/2018     Flu, Unspecified 10/22/1996, 10/20/1997, 10/20/1998, 10/14/1999, 11/09/2000, 10/23/2001, 10/28/2002, 10/23/2003, 10/18/2004, 10/19/2005, 10/19/2006, 10/24/2007, 10/28/2008, 09/29/2009, 10/20/2010     Influenza (H1N1) 01/11/2010     Influenza (High Dose) 3 valent vaccine 03/10/2016, 10/28/2016, 10/05/2017, 09/15/2019     Influenza (IIV3) PF 11/01/2012, 12/31/2012, 09/25/2014     Influenza Vaccine IM > 6 months Valent IIV4 (Alfuria,Fluzone) 09/18/2020     Pneumococcal 23 valent 06/24/1993     Pneumococcal, Unspecified 09/01/2004     Zoster vaccine recombinant adjuvanted (SHINGRIX) 09/18/2020, 12/15/2020     Above immunizations pulled from [facility]. [facility] and facility records also reconciled. Outstanding information sent to  to update [facility]  .  Future immunizations are not needed at this point as all recommended immunizations are up to date.      Current Outpatient Medications   Medication Sig Dispense Refill     tamsulosin (FLOMAX) 0.4 MG capsule Take 0.4 mg by mouth every evening       ACE/ARB/ARNI NOT PRESCRIBED (INTENTIONAL) Please choose reason not prescribed from choices below.       acetaminophen (TYLENOL) 500 MG tablet Take 1,000 mg by mouth 2 times daily OA       calcium carbonate (TUMS) 500 MG chewable tablet Take 1 chew tab by mouth 2 times daily (with meals)       Vitamin D, Cholecalciferol, 25 MCG (1000 UT) TABS Take 1,000 Units by mouth daily             Case Management:  I have reviewed the Assisted Living care plan, current immunizations and preventive care/cancer screening. .Future cancer screening is not clinically indicated secondary to age/goals of care Patient's desire to return to the community is not assessible  "due to cognitive impairment. Current Level of Care is appropriate.    Advance Directive Discussion:    I reviewed the current advanced directives as reflected in EPIC, the POLST and the facility chart, and verified the congruency of orders  . I contacted the first party--no changes in  and advance directives plan of Care.  I did not due to cognitive impairment review the advance directives with the resident.     Team Discussion:  I communicated with the appropriate disciplines involved with the Plan of Care:   Nursing    Patient's goal is unobtainable secondary to cognitive impairment.  Information reviewed:  Medications, vital signs, orders, and nursing notes.    ROS:  Very limited  secondary to cognitive impairment. Shook head no to: HA, CP, SOB--followed commands during exam to open mouth, squeeze hands, move feet    Vitals:  /55   Pulse 65   Temp 97.7  F (36.5  C)   Resp 18   Ht 1.727 m (5' 8\")   Wt 72.1 kg (159 lb)   SpO2 96%   BMI 24.18 kg/m   Body mass index is 24.18 kg/m .  Exam:  GENERAL APPEARANCE:  Alert, in no distress,   cooperative  ENT:  Mouth with moist mucous membranes,some teeth missing, no open areas noted. No lesions behind ears  EYES:  Conjunctivae, lids, pupils and irises normal  RESP:  respiratory effort  of chest normal, lungs decreased but CTA,  no respiratory distress  CV:  Auscultation of heart done , RRR, no murmur or edema, + pedal pulses  ABDOMEN:  normal bowel sounds, soft, nontender  M/S:   FREIRE though transient weakness left side at times--currently appears same. Was  seen in WC. Has +weakness RLE mildly  SKIN:  Inspection of skin at baseline but limited as pt fully dressed  NEURO:   Cranial nerves  are grossly at patient's baseline--  PSYCH:  oriented X 2, insight and judgement impaired, memory impaired        Lab/Diagnostic data:   Recent Labs   Lab Test 02/08/22  0729 04/27/21  0902    140   POTASSIUM 4.1 4.3   CHLORIDE 106 103   CO2 25 27   ANIONGAP 9 10   GLC " 91 128*   BUN 21 26   CR 1.31* 1.48*   MING 8.8 8.9     CBC RESULTS: Recent Labs   Lab Test 22  0729   WBC 5.2   RBC 3.78*   HGB 12.0*   HCT 35.5*   MCV 94   MCH 31.7   MCHC 33.8   RDW 12.5        ASSESSMENT / PLAN:  (N40.1,  R35.0) Benign prostatic hyperplasia with urinary frequency  (primary encounter diagnosis)  Comment/Plan: had been on Flomax in past.  2 PVRs today--195 cc and 1.  Will restart Flomax.  His frequency may be from the dementia but fair to restart Flomax also ilda with poor judgement and attempts to get up alone.    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   consider hospice if conts to decline. Monitor. Pt would benefit from a hospital bed--see DME below     (R29.6) Frequent falls  (M62.81) Generalized muscle weakness  Comment/Plan: see above, high fall risk, monitor.     (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  Comment/Plan: tylenol, Tums for Ca++, monitor.    (Z00.00) Routine general medical examination at a health care facility: 2022       Electronically signed by:  ADRYAN Castellanos CNP             Face to Face and Medical Necessity Statement for DME Provider visit    Demographic Information on Rell Erazo:  Gender: male  : 1928  [facility]  [address] [facility] [address]  [phone] (home)     Medical Record: [id number]  Social Security Number: [id number]  Primary Care Provider: Selam Fowler  Insurance: Payor: MEDICARE / Plan: MEDICARE / Product Type: Medicare /     HPI:   Rell Erazo is a 89  year old  (6/5/1928), who is being seen today for a face to face provider visit at [facility]; medical necessity statement for DME included. This patient requires the following:    DME Ordered and Medical Necessity Statement       The patient does  require positioning of the body in ways not feasible with an ordinary bed due to a medical condition that is expected to last at least 1 month due to new TIA with Left sided intermittent weakness. Has Rt leg weakness earlier stroke.   The patient does not require, for the alleviation of pain, postioning of the body in ways not feasible with an ordinary bed.   The patient does not require the head of bed elevated more than 30* most of the time due to CHF, chronic pulmonary disease or aspiration.  The patient does not require traction that can only be attached to a hospital bed.  The patient does  require a bed height different than a fixed height hospital bed to permit tranfers to wheelchair or standing position.   The patient does  require frequent or immediate changes in body position due to risk of pressure sores and difficulty repositioning self.  Needs grab bars to assist also.       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, subsequent encounter  Right leg weakness  TIA (transient ischemic attack)  Transient weakness of left leg  Frequent falls  Generalized muscle weakness  Primary osteoarthritis involving multiple joints  Routine general medical examination at a health care facility      [facility]   has a past medical history of ACP (advance care planning) (04/26/2012), BPH (benign prostatic hyperplasia), CAD (coronary artery disease), Carotid disease, bilateral (H), Chronic atrial fibrillation  "(H), CVA (cerebral infarction) (01/01/2013), Diverticulosis of large intestine, Hyperlipidemia LDL goal < 100 (04/26/2012), Kidney stone (1981), Personal history of tobacco use, presenting hazards to health, Restless legs syndrome (RLS), and TIA (transient ischemic attack) (05/2013).    He has no past medical history of Basal cell carcinoma, Malignant melanoma (H), or Squamous cell carcinoma of skin, unspecified.    ROS:Unobtainable secondary to cognitive impairment and  secondary to aphasia.     EXAM  Vitals: /55   Pulse 65   Temp 97.7  F (36.5  C)   Resp 18   Ht 1.727 m (5' 8\")   Wt 72.1 kg (159 lb)   SpO2 96%   BMI 24.18 kg/m  ;BMI= Body mass index is 24.18 kg/m .  See above    ASSESSMENT/PLAN:  1. Benign prostatic hyperplasia with urinary frequency    2. 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 Right leg weakness    5. TIA (transient ischemic attack)    6. Transient weakness of left leg    7. Frequent falls    8. Generalized muscle weakness    9. Primary osteoarthritis involving multiple joints    10. Routine general medical examination at a health care facility: 02/24/2022        Orders:  1. Facility staff/TC to contact DME company to get their order form for provider to fill out     ELECTRONICALLY SIGNED BY [place] CERTIFIED PROVIDER:  ADRYAN Castellanos CNP   NPI: [id number]  [facility]  [address] [place] [address], [address]  [place] [address]          "

## 2022-03-09 ENCOUNTER — ASSISTED LIVING VISIT (OUTPATIENT)
Dept: GERIATRICS | Facility: CLINIC | Age: 87
End: 2022-03-09
Payer: MEDICARE

## 2022-03-09 VITALS
DIASTOLIC BLOOD PRESSURE: 55 MMHG | TEMPERATURE: 97.7 F | RESPIRATION RATE: 18 BRPM | HEART RATE: 65 BPM | OXYGEN SATURATION: 96 % | SYSTOLIC BLOOD PRESSURE: 107 MMHG

## 2022-03-09 DIAGNOSIS — M62.81 GENERALIZED MUSCLE WEAKNESS: ICD-10-CM

## 2022-03-09 DIAGNOSIS — S06.350D TRAUMATIC HEMORRHAGE OF LEFT CEREBRUM WITHOUT LOSS OF CONSCIOUSNESS, SUBSEQUENT ENCOUNTER: ICD-10-CM

## 2022-03-09 DIAGNOSIS — R35.0 BENIGN PROSTATIC HYPERPLASIA WITH URINARY FREQUENCY: Primary | ICD-10-CM

## 2022-03-09 DIAGNOSIS — F03.B0 MODERATE DEMENTIA WITHOUT BEHAVIORAL DISTURBANCE (H): ICD-10-CM

## 2022-03-09 DIAGNOSIS — N40.1 BENIGN PROSTATIC HYPERPLASIA WITH URINARY FREQUENCY: Primary | ICD-10-CM

## 2022-03-09 RX ORDER — FINASTERIDE 5 MG/1
5 TABLET, FILM COATED ORAL DAILY
COMMUNITY
Start: 2022-03-10 | End: 2023-01-01

## 2022-03-09 NOTE — PROGRESS NOTES
Chief Complaint:  Called by staff that pt had been wandering with WC into other peoples rooms to use bathroom..    HPI:    Rell Erazo is a 89 year old  (6/5/1928), who is being seen today for an episodic care visit at [facility]. 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 [place] self around unit.    /55   Pulse 65   Temp 97.7  F (36.5  C)   Resp 18   SpO2 96%   GENERAL APPEARANCE:  Alert,oriented x self in no distress.  No new focal neurological deficits.     ASSESSMENT / PLAN:  (N40.1,  R35.0) Benign prostatic hyperplasia with urinary frequency  (primary encounter diagnosis)  Comment/Plan: resume Flomax, staff had held as not sure if behavior of wandering from the med(d/w staff, unlikely and would be unusual). [place] tart Proscar also and when/if able will discontinue Flomax to minimize meds.     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  Redirect, monitor.      Electronically Signed by:  ADRYAN Castellanos CNP

## 2022-03-09 NOTE — LETTER
3/9/2022        RE: Rell Erazo  [facility]  [address]  [facility] [address]        Chief Complaint:  Called by staff that pt had been wandering with WC into other peoples rooms to use bathroom..    HPI:    Rell Erazo is a 89 year old  (6/5/1928), who is being seen today for an episodic care visit at [facility]. 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 [place] self around unit.    /55   Pulse 65   Temp 97.7  F (36.5  C)   Resp 18   SpO2 96%   GENERAL APPEARANCE:  Alert,oriented x self in no distress.  No new focal neurological deficits.     ASSESSMENT / PLAN:  (N40.1,  R35.0) Benign prostatic hyperplasia with urinary frequency  (primary encounter diagnosis)  Comment/Plan: resume Flomax, staff had held as not sure if behavior of wandering from the med(d/w staff, unlikely and would be unusual). [place] tart Proscar also and when/if able will discontinue Flomax to minimize meds.     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  Redirect, monitor.      Electronically Signed by:  ADRYAN Castellanos CNP          Sincerely,        ADRYAN Castellanos CNP

## 2022-04-14 DIAGNOSIS — G47.00 INSOMNIA, UNSPECIFIED TYPE: ICD-10-CM

## 2022-04-14 DIAGNOSIS — F03.B0 MODERATE DEMENTIA WITHOUT BEHAVIORAL DISTURBANCE (H): Primary | ICD-10-CM

## 2022-04-14 RX ORDER — TRAZODONE HYDROCHLORIDE 50 MG/1
25 TABLET, FILM COATED ORAL AT BEDTIME
Qty: 30 TABLET | Refills: 1 | Status: SHIPPED | OUTPATIENT
Start: 2022-04-14 | End: 2023-01-01

## 2022-04-20 VITALS
TEMPERATURE: 97.7 F | DIASTOLIC BLOOD PRESSURE: 55 MMHG | RESPIRATION RATE: 18 BRPM | OXYGEN SATURATION: 96 % | SYSTOLIC BLOOD PRESSURE: 107 MMHG | HEART RATE: 65 BPM

## 2022-04-20 NOTE — PROGRESS NOTES
"Chief Complaint: f/u on agitation and commencement of Trazodone    HPI:    Rell Erazo is a 89 year old  (6/5/1928), who is being seen today for an episodic care visit at [facility]. Today's concern is: per report from nursing: he has been calm, not agitated, sleep better.     Moderate dementia with behavioral disturbance (H)  Agitation  Insomnia, unspecified type      REVIEW OF SYSTEMS:  Very limited secondary to cognitive impairment or aphasia, but today pt reports  \"fine\" when asked, no other conversation    /55   Pulse 65   Temp 97.7  F (36.5  C)   Resp 18   SpO2 96%   GENERAL APPEARANCE:  Alert,oriented x self, is  in no distress. RESP status is calm. He is eating BF.  No new focal neurological deficits.     Current Outpatient Medications   Medication Sig Dispense Refill     ACE/ARB/ARNI NOT PRESCRIBED (INTENTIONAL) Please choose reason not prescribed from choices below.       acetaminophen (TYLENOL) 500 MG tablet Take 1,000 mg by mouth 2 times daily OA       calcium carbonate (TUMS) 500 MG chewable tablet Take 1 chew tab by mouth 2 times daily (with meals)       finasteride (PROSCAR) 5 MG tablet Take 5 mg by mouth daily       tamsulosin (FLOMAX) 0.4 MG capsule Take 0.4 mg by mouth every evening       traZODone (DESYREL) 50 MG tablet Take 0.5 tablets (25 mg) by mouth At Bedtime 30 tablet 1     Vitamin D, Cholecalciferol, 25 MCG (1000 UT) TABS Take 1,000 Units by mouth daily       ASSESSMENT / PLAN:  (F03.91) Moderate dementia with behavioral disturbance (H)  (primary encounter diagnosis)   (R45.1) Agitation   (G47.00) Insomnia, unspecified type  Comment/Plan: cont trazodone, will adjust if needed. Monitor.      Electronically Signed by:  ADRYAN Castellanos CNP    "

## 2022-04-21 ENCOUNTER — ASSISTED LIVING VISIT (OUTPATIENT)
Dept: GERIATRICS | Facility: CLINIC | Age: 87
End: 2022-04-21
Payer: MEDICARE

## 2022-04-21 DIAGNOSIS — R45.1 AGITATION: ICD-10-CM

## 2022-04-21 DIAGNOSIS — F03.B18 MODERATE DEMENTIA WITH BEHAVIORAL DISTURBANCE (H): Primary | ICD-10-CM

## 2022-04-21 DIAGNOSIS — G47.00 INSOMNIA, UNSPECIFIED TYPE: ICD-10-CM

## 2022-04-21 NOTE — LETTER
"    4/21/2022        RE: Rell Erazo  [facility]  [address]  [facility] [address]        Chief Complaint: f/u on agitation and commencement of Trazodone    HPI:    Rell Erazo is a 89 year old  (6/5/1928), who is being seen today for an episodic care visit at [facility]. Today's concern is: per report from nursing: he has been calm, not agitated, sleep better.     Moderate dementia with behavioral disturbance (H)  Agitation  Insomnia, unspecified type      REVIEW OF SYSTEMS:  Very limited secondary to cognitive impairment or aphasia, but today pt reports  \"fine\" when asked, no other conversation    /55   Pulse 65   Temp 97.7  F (36.5  C)   Resp 18   SpO2 96%   GENERAL APPEARANCE:  Alert,oriented x self, is  in no distress. RESP status is calm. He is eating BF.  No new focal neurological deficits.     Current Outpatient Medications   Medication Sig Dispense Refill     ACE/ARB/ARNI NOT PRESCRIBED (INTENTIONAL) Please choose reason not prescribed from choices below.       acetaminophen (TYLENOL) 500 MG tablet Take 1,000 mg by mouth 2 times daily OA       calcium carbonate (TUMS) 500 MG chewable tablet Take 1 chew tab by mouth 2 times daily (with meals)       finasteride (PROSCAR) 5 MG tablet Take 5 mg by mouth daily       tamsulosin (FLOMAX) 0.4 MG capsule Take 0.4 mg by mouth every evening       traZODone (DESYREL) 50 MG tablet Take 0.5 tablets (25 mg) by mouth At Bedtime 30 tablet 1     Vitamin D, Cholecalciferol, 25 MCG (1000 UT) TABS Take 1,000 Units by mouth daily       ASSESSMENT / PLAN:  (F03.91) Moderate dementia with behavioral disturbance (H)  (primary encounter diagnosis)   (R45.1) Agitation   (G47.00) Insomnia, unspecified type  Comment/Plan: cont trazodone, will adjust if needed. Monitor.      Electronically Signed by:  ADRYAN Castellanos CNP          Sincerely,        ADRYAN Castellanos CNP    "

## 2022-05-03 ENCOUNTER — ASSISTED LIVING VISIT (OUTPATIENT)
Dept: GERIATRICS | Facility: CLINIC | Age: 87
End: 2022-05-03
Payer: MEDICARE

## 2022-05-03 VITALS
OXYGEN SATURATION: 96 % | DIASTOLIC BLOOD PRESSURE: 55 MMHG | SYSTOLIC BLOOD PRESSURE: 107 MMHG | RESPIRATION RATE: 18 BRPM | TEMPERATURE: 97.7 F | HEART RATE: 65 BPM

## 2022-05-03 DIAGNOSIS — Z74.09 DOES NOT WALK: ICD-10-CM

## 2022-05-03 DIAGNOSIS — I63.9 CEREBRAL INFARCTION, UNSPECIFIED MECHANISM (H): ICD-10-CM

## 2022-05-03 DIAGNOSIS — R45.1 AGITATION: ICD-10-CM

## 2022-05-03 DIAGNOSIS — N40.1 BENIGN PROSTATIC HYPERPLASIA WITH URINARY FREQUENCY: ICD-10-CM

## 2022-05-03 DIAGNOSIS — R29.6 FREQUENT FALLS: ICD-10-CM

## 2022-05-03 DIAGNOSIS — R35.0 BENIGN PROSTATIC HYPERPLASIA WITH URINARY FREQUENCY: ICD-10-CM

## 2022-05-03 DIAGNOSIS — R29.898 RIGHT LEG WEAKNESS: ICD-10-CM

## 2022-05-03 DIAGNOSIS — G47.00 INSOMNIA, UNSPECIFIED TYPE: ICD-10-CM

## 2022-05-03 DIAGNOSIS — F03.B18 MODERATE DEMENTIA WITH BEHAVIORAL DISTURBANCE (H): Primary | ICD-10-CM

## 2022-05-03 NOTE — PROGRESS NOTES
"Chief Complaint: f/u on dementia related insomnia with agitation.    HPI:    Rell Erazo is a 89 year old  (6/5/1928), who is being seen today for an episodic care visit at [facility]. Today's concern is: behavior better in regards to wandering into other residents' room and less agitated.   He propels himself often around units. Does not walk.     Moderate dementia with behavioral disturbance (H)  Agitation  Insomnia, unspecified type  Benign prostatic hyperplasia with urinary frequency  Cerebral infarction, unspecified mechanism (H)  Right leg weakness  Does not walk  Frequent falls      REVIEW OF SYSTEMS:  Very limited secondary to cognitive impairment: Initially when asked, he took a swing at me. Then when re-asked said he felt \"fine\" and \"no\" to any pain. He allowed BP and exam    /55   Pulse 65   Temp 97.7  F (36.5  C)   Resp 18   SpO2 96%   GENERAL APPEARANCE:  Alert,oriented x self, is in no distress. Lungs CTA , RRR. No  edema. Abdomen is soft, +BTS . Propels self in WC with feet.  Has no new  focal neurological deficits.       Current Outpatient Medications   Medication Sig Dispense Refill     ACE/ARB/ARNI NOT PRESCRIBED (INTENTIONAL) Please choose reason not prescribed from choices below.       acetaminophen (TYLENOL) 500 MG tablet Take 1,000 mg by mouth 2 times daily OA       calcium carbonate (TUMS) 500 MG chewable tablet Take 1 chew tab by mouth 2 times daily (with meals)       finasteride (PROSCAR) 5 MG tablet Take 5 mg by mouth daily       tamsulosin (FLOMAX) 0.4 MG capsule Take 0.4 mg by mouth every evening       traZODone (DESYREL) 50 MG tablet Take 0.5 tablets (25 mg) by mouth At Bedtime 30 tablet 1     Vitamin D, Cholecalciferol, 25 MCG (1000 UT) TABS Take 1,000 Units by mouth daily       Recent Labs   Lab Test 02/08/22  0729 04/27/21  0902    140   POTASSIUM 4.1 4.3   CHLORIDE 106 103   CO2 25 27   ANIONGAP 9 10   GLC 91 128*   BUN 21 26 "   CR 1.31* 1.48*   MING 8.8 8.9     CBC RESULTS: Recent Labs   Lab Test 02/08/22  0729   WBC 5.2   RBC 3.78*   HGB 12.0*   HCT 35.5*   MCV 94   MCH 31.7   MCHC 33.8   RDW 12.5            ASSESSMENT / PLAN:  (F03.91) Moderate dementia with behavioral disturbance (H)  (primary encounter diagnosis)   (R45.1) Agitation   (G47.00) Insomnia, unspecified type  Comment/Plan: improved with after addition of  Trazodone, will monitor, adjust as needed.    (N40.1,  R35.0) Benign prostatic hyperplasia with urinary frequency  Comment/Plan: with Flomax and Proscar, urgency better(*some of his wandering into others' rooms was to w[place] Bathroom in past*), will  cont same and monitor.    (I63.9) Cerebral infarction, unspecified mechanism (H)  (R29.898) Right leg weakness   Comment/Plan: Rt LE weakness as before, not ambulatory--see below    (Z74.09) Does not walk   (R29.6) Frequent falls  Comment/Plan: no recent falls as not walking or transferring by self. Monitor. Cont with Ca and Vit D for now      Electronically Signed by:  ADRYAN Castellanos CNP

## 2022-05-03 NOTE — LETTER
"    5/3/2022        RE: Rell Erazo  [facility]  [address] [facility] [address]        Chief Complaint: f/u on dementia related insomnia with agitation.    HPI:    Rell Erazo is a 89 year old  (6/5/1928), who is being seen today for an episodic care visit at [facility]. Today's concern is: behavior better in regards to wandering into other residents' room and less agitated.   He propels himself often around units. Does not walk.     Moderate dementia with behavioral disturbance (H)  Agitation  Insomnia, unspecified type  Benign prostatic hyperplasia with urinary frequency  Cerebral infarction, unspecified mechanism (H)  Right leg weakness  Does not walk  Frequent falls      REVIEW OF SYSTEMS:  Very limited secondary to cognitive impairment: Initially when asked, he took a swing at me. Then when re-asked said he felt \"fine\" and \"no\" to any pain. He allowed BP and exam    /55   Pulse 65   Temp 97.7  F (36.5  C)   Resp 18   SpO2 96%   GENERAL APPEARANCE:  Alert,oriented x self, is in no distress. Lungs CTA , RRR. No  edema. Abdomen is soft, +BTS . Propels self in WC with feet.  Has no new  focal neurological deficits.       Current Outpatient Medications   Medication Sig Dispense Refill     ACE/ARB/ARNI NOT PRESCRIBED (INTENTIONAL) Please choose reason not prescribed from choices below.       acetaminophen (TYLENOL) 500 MG tablet Take 1,000 mg by mouth 2 times daily OA       calcium carbonate (TUMS) 500 MG chewable tablet Take 1 chew tab by mouth 2 times daily (with meals)       finasteride (PROSCAR) 5 MG tablet Take 5 mg by mouth daily       tamsulosin (FLOMAX) 0.4 MG capsule Take 0.4 mg by mouth every evening       traZODone (DESYREL) 50 MG tablet Take 0.5 tablets (25 mg) by mouth At Bedtime 30 tablet 1     Vitamin D, Cholecalciferol, 25 MCG (1000 UT) TABS Take 1,000 Units by mouth daily       Recent Labs   Lab Test 02/08/22  0729 " 04/27/21  0902    140   POTASSIUM 4.1 4.3   CHLORIDE 106 103   CO2 25 27   ANIONGAP 9 10   GLC 91 128*   BUN 21 26   CR 1.31* 1.48*   MING 8.8 8.9     CBC RESULTS: Recent Labs   Lab Test 02/08/22  0729   WBC 5.2   RBC 3.78*   HGB 12.0*   HCT 35.5*   MCV 94   MCH 31.7   MCHC 33.8   RDW 12.5            ASSESSMENT / PLAN:  (F03.91) Moderate dementia with behavioral disturbance (H)  (primary encounter diagnosis)   (R45.1) Agitation   (G47.00) Insomnia, unspecified type  Comment/Plan: improved with after addition of  Trazodone, will monitor, adjust as needed.    (N40.1,  R35.0) Benign prostatic hyperplasia with urinary frequency  Comment/Plan: with Flomax and Proscar, urgency better(*some of his wandering into others' rooms was to wade Bathroom in past*), will  cont same and monitor.    (I63.9) Cerebral infarction, unspecified mechanism (H)  (R29.898) Right leg weakness   Comment/Plan: Rt LE weakness as before, not ambulatory--see below    (Z74.09) Does not walk   (R29.6) Frequent falls  Comment/Plan: no recent falls as not walking or transferring by self. Monitor. Cont with Ca and Vit D for now      Electronically Signed by:  ADRYAN Castellanos CNP          Sincerely,        ADRYAN Castellanos CNP

## 2022-06-06 ENCOUNTER — TELEPHONE (OUTPATIENT)
Dept: GERIATRICS | Facility: CLINIC | Age: 87
End: 2022-06-06
Payer: MEDICARE

## 2022-06-06 NOTE — TELEPHONE ENCOUNTER
[facility] Nurse FYI Fax Note    Provider: ADRYAN Mckinney CNP    Facility: [facility]   Facility Type:  AL    Sender: Edith  Call Back Number: [phone]    No Known Allergies    FYI Fax received: Fall over weekend w/o injury           Action Taken: Notified provider.    FYI Fax sent to Provider:  ADRYAN Mckinney CNP, RN

## 2022-06-08 ENCOUNTER — ASSISTED LIVING VISIT (OUTPATIENT)
Dept: GERIATRICS | Facility: CLINIC | Age: 87
End: 2022-06-08
Payer: MEDICARE

## 2022-06-08 VITALS
TEMPERATURE: 97.7 F | WEIGHT: 159 LBS | RESPIRATION RATE: 18 BRPM | DIASTOLIC BLOOD PRESSURE: 55 MMHG | HEIGHT: 68 IN | SYSTOLIC BLOOD PRESSURE: 107 MMHG | OXYGEN SATURATION: 96 % | BODY MASS INDEX: 24.1 KG/M2 | HEART RATE: 65 BPM

## 2022-06-08 DIAGNOSIS — N40.1 BENIGN PROSTATIC HYPERPLASIA WITH URINARY FREQUENCY: ICD-10-CM
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
## 2022-06-08 DIAGNOSIS — G47.00 INSOMNIA, UNSPECIFIED TYPE: ICD-10-CM

## 2022-06-08 DIAGNOSIS — R35.0 BENIGN PROSTATIC HYPERPLASIA WITH URINARY FREQUENCY: ICD-10-CM

## 2022-06-08 DIAGNOSIS — R45.1 AGITATION: ICD-10-CM

## 2022-06-08 DIAGNOSIS — R29.6 FREQUENT FALLS: ICD-10-CM

## 2022-06-08 DIAGNOSIS — F03.B18 MODERATE DEMENTIA WITH BEHAVIORAL DISTURBANCE (H): ICD-10-CM

## 2022-06-08 NOTE — PROGRESS NOTES
"Chief Complaint: fell on 6/6/22--no injury reported except bruise and small skin tear on left elbow area.    HPI:    Rell Erazo is a 89 year old  (6/5/1928), who is being seen today for an episodic care visit at [facility]. Today's concern is: pt has no memory of fall. Staff is keeping room doors closed to avoid pt going into other rooms.     Fall, initial encounter  Frequent falls  Moderate dementia with behavioral disturbance (H)  Agitation  Insomnia, unspecified type  Benign prostatic hyperplasia with urinary frequency      REVIEW OF SYSTEMS:  Very limited secondary to cognitive impairment and aphasia, but today pt reports no pain,\"fine\". [place] self around unit.     /55   Pulse 65   Temp 97.7  F (36.5  C)   Resp 18   Ht 1.727 m (5' 8\")   Wt 72.1 kg (159 lb)   SpO2 96%   BMI 24.18 kg/m    GENERAL APPEARANCE:  Alert,  in no distress. Lungs CTA , RRR, No  edema. Abdomen is soft,. Scab on left elbow--no bruise noted. No new  focal neurological deficits.     Current Outpatient Medications   Medication Sig Dispense Refill     ACE/ARB/ARNI NOT PRESCRIBED (INTENTIONAL) Please choose reason not prescribed from choices below.       acetaminophen (TYLENOL) 500 MG tablet Take 1,000 mg by mouth 2 times daily OA       calcium carbonate (TUMS) 500 MG chewable tablet Take 1 chew tab by mouth 2 times daily (with meals)       finasteride (PROSCAR) 5 MG tablet Take 5 mg by mouth daily       tamsulosin (FLOMAX) 0.4 MG capsule Take 0.4 mg by mouth every evening       traZODone (DESYREL) 50 MG tablet Take 0.5 tablets (25 mg) by mouth At Bedtime 30 tablet 1     Vitamin D, Cholecalciferol, 25 MCG (1000 UT) TABS Take 1,000 Units by mouth daily         ASSESSMENT / PLAN:  (W19.XXXA) Fall, initial encounter  (primary encounter diagnosis)   (R29.6) Frequent falls  Comment: no injury--except as noted above,   Plan: doing fine. Monitor. Non ambulatory    (F03.91) Moderate dementia " with behavioral disturbance (H)   (R45.1) Agitation   (G47.00) Insomnia, unspecified type  Comment/Plan: trazodone, wanders in WC, no recent agitation thought, adjust Desyrel if needed. Monitor.    (N40.1,  R35.0) Benign prostatic hyperplasia with urinary frequency  Comment/Plan: Proscar, Flomax--staff said Alvin had asked if increasing Flomax would help with wandering--will not as it is believed to be dementia related.  Cont to monitor, no changes at this time        Electronically Signed by:  ADRYAN Castellanos CNP

## 2022-06-08 NOTE — LETTER
"    6/8/2022        RE: Rell Erazo  [facility]  [address]  [facility] [address]        Chief Complaint: fell on 6/6/22--no injury reported except bruise and small skin tear on left elbow area.    HPI:    Rell Erazo is a 89 year old  (6/5/1928), who is being seen today for an episodic care visit at [facility]. Today's concern is: pt has no memory of fall. Staff is keeping room doors closed to avoid pt going into other rooms.     Fall, initial encounter  Frequent falls  Moderate dementia with behavioral disturbance (H)  Agitation  Insomnia, unspecified type  Benign prostatic hyperplasia with urinary frequency      REVIEW OF SYSTEMS:  Very limited secondary to cognitive impairment and aphasia, but today pt reports no pain,\"fine\". [place] self around unit.     /55   Pulse 65   Temp 97.7  F (36.5  C)   Resp 18   Ht 1.727 m (5' 8\")   Wt 72.1 kg (159 lb)   SpO2 96%   BMI 24.18 kg/m    GENERAL APPEARANCE:  Alert,  in no distress. Lungs CTA , RRR, No  edema. Abdomen is soft,. Scab on left elbow--no bruise noted. No new  focal neurological deficits.     Current Outpatient Medications   Medication Sig Dispense Refill     ACE/ARB/ARNI NOT PRESCRIBED (INTENTIONAL) Please choose reason not prescribed from choices below.       acetaminophen (TYLENOL) 500 MG tablet Take 1,000 mg by mouth 2 times daily OA       calcium carbonate (TUMS) 500 MG chewable tablet Take 1 chew tab by mouth 2 times daily (with meals)       finasteride (PROSCAR) 5 MG tablet Take 5 mg by mouth daily       tamsulosin (FLOMAX) 0.4 MG capsule Take 0.4 mg by mouth every evening       traZODone (DESYREL) 50 MG tablet Take 0.5 tablets (25 mg) by mouth At Bedtime 30 tablet 1     Vitamin D, Cholecalciferol, 25 MCG (1000 UT) TABS Take 1,000 Units by mouth daily         ASSESSMENT / PLAN:  (W19.XXXA) Fall, initial encounter  (primary encounter diagnosis)   (R29.6) Frequent " falls  Comment: no injury--except as noted above,   Plan: doing fine. Monitor. Non ambulatory    (F03.91) Moderate dementia with behavioral disturbance (H)   (R45.1) Agitation   (G47.00) Insomnia, unspecified type  Comment/Plan: trazodone, wanders in WC, no recent agitation thought, adjust Desyrel if needed. Monitor.    (N40.1,  R35.0) Benign prostatic hyperplasia with urinary frequency  Comment/Plan: Proscar, Flomax--staff said Alvin had asked if increasing Flomax would help with wandering--will not as it is believed to be dementia related.  Cont to monitor, no changes at this time        Electronically Signed by:  ADRYAN Castellanos CNP          Sincerely,        ADRYAN Castellanos CNP
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
## 2022-06-10 ENCOUNTER — TELEPHONE (OUTPATIENT)
Dept: GERIATRICS | Facility: CLINIC | Age: 87
End: 2022-06-10
Payer: MEDICARE

## 2022-06-10 NOTE — TELEPHONE ENCOUNTER
[facility] Triage Nurse Telephone Encounter    Provider: ADRYAN Mckinney CNP    Facility: [facility]  Facility Type:  AL    Caller: Edith  Call Back Number: [phone]    Allergies:    Allergies   Allergen Reactions     Cat Hair Extract      Other reaction(s): Other (see comments)  Runny nose        Reason for call: Nurse is reporting that patient's right 5th toenail fell off.  Patient was seen by in-house podiatry for annual exam.  No bleeding or drainage noted from the right 5th toenail area.      Verbal Order/Direction given by Provider: Apply bacitracin and a bandaid.  Change daily until healed.      Provider giving Order:  Rosa Yanez MD    Verbal Order given to: Edith Tyson RN

## 2022-07-12 ENCOUNTER — ASSISTED LIVING VISIT (OUTPATIENT)
Dept: GERIATRICS | Facility: CLINIC | Age: 87
End: 2022-07-12
Payer: MEDICARE

## 2022-07-12 VITALS
HEIGHT: 68 IN | TEMPERATURE: 97.7 F | OXYGEN SATURATION: 96 % | BODY MASS INDEX: 24.1 KG/M2 | WEIGHT: 159 LBS | RESPIRATION RATE: 16 BRPM | SYSTOLIC BLOOD PRESSURE: 103 MMHG | DIASTOLIC BLOOD PRESSURE: 61 MMHG | HEART RATE: 72 BPM

## 2022-07-12 DIAGNOSIS — R45.1 AGITATION: ICD-10-CM

## 2022-07-12 DIAGNOSIS — R29.6 FREQUENT FALLS: Primary | ICD-10-CM

## 2022-07-12 DIAGNOSIS — G47.00 INSOMNIA, UNSPECIFIED TYPE: ICD-10-CM

## 2022-07-12 DIAGNOSIS — F03.B18 MODERATE DEMENTIA WITH BEHAVIORAL DISTURBANCE (H): ICD-10-CM

## 2022-07-12 DIAGNOSIS — N40.1 BENIGN PROSTATIC HYPERPLASIA WITH URINARY FREQUENCY: ICD-10-CM

## 2022-07-12 DIAGNOSIS — K08.9 POOR DENTITION: ICD-10-CM

## 2022-07-12 DIAGNOSIS — R35.0 BENIGN PROSTATIC HYPERPLASIA WITH URINARY FREQUENCY: ICD-10-CM

## 2022-07-12 DIAGNOSIS — Z74.09 DOES NOT WALK: ICD-10-CM

## 2022-07-12 DIAGNOSIS — M62.81 GENERALIZED MUSCLE WEAKNESS: ICD-10-CM

## 2022-07-12 NOTE — PROGRESS NOTES
"[facility]  [place] [facility] Record Number:  [id number]  Place of Service where encounter took place:  [facility] ([facility]) [[id number]]  Chief Complaint   Patient presents with     Nursing Home Acute       HPI:    Rell Erazo  is a 89 year old (6/5/1928), who is being seen today for an episodic care visit.  HPI information obtained from: facility chart records, facility staff and [facility] chart review. Today's concern is: no acute issues per staff.  No recent falls,or wandering into others rooms in .     Frequent falls  Generalized muscle weakness  Does not walk  Moderate dementia with behavioral disturbance (H)  Agitation  Insomnia, unspecified type  Benign prostatic hyperplasia with urinary frequency  Poor dentition      Past Medical and Surgical History reviewed in Epic today.    MEDICATIONS:     Current Outpatient Medications   Medication Sig Dispense Refill     ACE/ARB/ARNI NOT PRESCRIBED (INTENTIONAL) Please choose reason not prescribed from choices below.       acetaminophen (TYLENOL) 500 MG tablet Take 1,000 mg by mouth 2 times daily OA       calcium carbonate (TUMS) 500 MG chewable tablet Take 1 chew tab by mouth 2 times daily (with meals)       finasteride (PROSCAR) 5 MG tablet Take 5 mg by mouth daily       tamsulosin (FLOMAX) 0.4 MG capsule Take 0.4 mg by mouth every evening       traZODone (DESYREL) 50 MG tablet Take 0.5 tablets (25 mg) by mouth At Bedtime 30 tablet 1     Vitamin D, Cholecalciferol, 25 MCG (1000 UT) TABS Take 1,000 Units by mouth daily            REVIEW OF SYSTEMS:  Unobtainable secondary to cognitive impairment.  \"I am fine\"    Objective:  /61   Pulse 72   Temp 97.7  F (36.5  C)   Resp 16   Ht 1.727 m (5' 8\")   Wt 72.1 kg (159 lb)   SpO2 96%   BMI 24.18 kg/m    Exam:  GENERAL APPEARANCE:  Alert, in no distress, cooperative  ENT:  missing teeth upper and lower--no sign of infection or redness  EYES:  Conjunctiva and " lids normal  RESP:  respiratory effort  of chest normal, lungs clear to auscultation , no respiratory distress  CV:  Auscultation of heart done , regular rate and rhythm, no murmur, rub, or gallop, no edema, warm feet  ABDOMEN:  normal bowel sounds, soft, nontender.  M/S:   non ambulatory--propels self in WC  SKIN:  Inspection of skin and subcutaneous tissue baseline but limited as pt fully dressed  NEURO:   Cranial nerves are normal tested and grossly at patient's baseline  PSYCH:  insight and judgement impaired, memory impaired     Labs:   Recent Labs   Lab Test 02/08/22  0729 04/27/21  0902    140   POTASSIUM 4.1 4.3   CHLORIDE 106 103   CO2 25 27   ANIONGAP 9 10   GLC 91 128*   BUN 21 26   CR 1.31* 1.48*   MING 8.8 8.9     Hemoglobin   Date Value Ref Range Status   02/08/2022 12.0 (L) 13.3 - 17.7 g/dL Final   04/27/2021 13.8 (L) 14.0 - 18.0 g/dL Final   04/27/2021 13.8 (L) 14.0 - 18.0 g/dL Final   10/04/2017 13.3 13.3 - 17.7 g/dL Final     ASSESSMENT / PLAN:  (R29.6) Frequent falls  (primary encounter diagnosis)   (M62.81) Generalized muscle weakness   (Z74.09) Does not walk  Comment/Plan: no recent falls, no longer walks and less impulsive with tranfers.   Needs  Help with adls.    (F03.91) Moderate dementia with behavioral disturbance (H)   (R45.1) Agitation   (G47.00) Insomnia, unspecified type  Comment/Plan: less agitation, needs 24 hr cares in the memory care, will cont to decline.    (N40.1,  R35.0) Benign prostatic hyperplasia with urinary frequency  Comment/Plan: Flomax,  No increase in urgency or signs of urinary issues, monitor.    (K08.9) Poor dentition  Comment/Plan: not candidate for dental extractions, would not tolerate. Watch for infection or decay and discomfort.    Electronically signed by:  ADRYAN Castellanos CNP

## 2022-07-12 NOTE — LETTER
"    7/12/2022        RE: Rell Erazo  [facility]  [address]  [facility] [address]        [facility]  [place] [facility] Record Number:  [id number]  Place of Service where encounter took place:  [facility] ([facility]) [[id number]]  Chief Complaint   Patient presents with     Nursing Home Acute       HPI:    Rell Erazo  is a 89 year old (6/5/1928), who is being seen today for an episodic care visit.  HPI information obtained from: facility chart records, facility staff and [facility] chart review. Today's concern is: no acute issues per staff.  No recent falls,or wandering into others rooms in .     Frequent falls  Generalized muscle weakness  Does not walk  Moderate dementia with behavioral disturbance (H)  Agitation  Insomnia, unspecified type  Benign prostatic hyperplasia with urinary frequency  Poor dentition      Past Medical and Surgical History reviewed in Epic today.    MEDICATIONS:     Current Outpatient Medications   Medication Sig Dispense Refill     ACE/ARB/ARNI NOT PRESCRIBED (INTENTIONAL) Please choose reason not prescribed from choices below.       acetaminophen (TYLENOL) 500 MG tablet Take 1,000 mg by mouth 2 times daily OA       calcium carbonate (TUMS) 500 MG chewable tablet Take 1 chew tab by mouth 2 times daily (with meals)       finasteride (PROSCAR) 5 MG tablet Take 5 mg by mouth daily       tamsulosin (FLOMAX) 0.4 MG capsule Take 0.4 mg by mouth every evening       traZODone (DESYREL) 50 MG tablet Take 0.5 tablets (25 mg) by mouth At Bedtime 30 tablet 1     Vitamin D, Cholecalciferol, 25 MCG (1000 UT) TABS Take 1,000 Units by mouth daily            REVIEW OF SYSTEMS:  Unobtainable secondary to cognitive impairment.  \"I am fine\"    Objective:  /61   Pulse 72   Temp 97.7  F (36.5  C)   Resp 16   Ht 1.727 m (5' 8\")   Wt 72.1 kg (159 lb)   SpO2 96%   BMI 24.18 kg/m    Exam:  GENERAL APPEARANCE:  Alert, in " no distress, cooperative  ENT:  missing teeth upper and lower--no sign of infection or redness  EYES:  Conjunctiva and lids normal  RESP:  respiratory effort  of chest normal, lungs clear to auscultation , no respiratory distress  CV:  Auscultation of heart done , regular rate and rhythm, no murmur, rub, or gallop, no edema, warm feet  ABDOMEN:  normal bowel sounds, soft, nontender.  M/S:   non ambulatory--propels self in WC  SKIN:  Inspection of skin and subcutaneous tissue baseline but limited as pt fully dressed  NEURO:   Cranial nerves are normal tested and grossly at patient's baseline  PSYCH:  insight and judgement impaired, memory impaired     Labs:   Recent Labs   Lab Test 02/08/22  0729 04/27/21  0902    140   POTASSIUM 4.1 4.3   CHLORIDE 106 103   CO2 25 27   ANIONGAP 9 10   GLC 91 128*   BUN 21 26   CR 1.31* 1.48*   MING 8.8 8.9     Hemoglobin   Date Value Ref Range Status   02/08/2022 12.0 (L) 13.3 - 17.7 g/dL Final   04/27/2021 13.8 (L) 14.0 - 18.0 g/dL Final   04/27/2021 13.8 (L) 14.0 - 18.0 g/dL Final   10/04/2017 13.3 13.3 - 17.7 g/dL Final     ASSESSMENT / PLAN:  (R29.6) Frequent falls  (primary encounter diagnosis)   (M62.81) Generalized muscle weakness   (Z74.09) Does not walk  Comment/Plan: no recent falls, no longer walks and less impulsive with tranfers.   Needs  Help with adls.    (F03.91) Moderate dementia with behavioral disturbance (H)   (R45.1) Agitation   (G47.00) Insomnia, unspecified type  Comment/Plan: less agitation, needs 24 hr cares in the memory care, will cont to decline.    (N40.1,  R35.0) Benign prostatic hyperplasia with urinary frequency  Comment/Plan: Flomax,  No increase in urgency or signs of urinary issues, monitor.    (K08.9) Poor dentition  Comment/Plan: not candidate for dental extractions, would not tolerate. Watch for infection or decay and discomfort.    Electronically signed by:  ADRYAN Castellanos CNP                 Sincerely,        Selam Fowler,  APRN CNP

## 2022-07-19 ENCOUNTER — ASSISTED LIVING VISIT (OUTPATIENT)
Dept: GERIATRICS | Facility: CLINIC | Age: 87
End: 2022-07-19
Payer: MEDICARE

## 2022-07-19 VITALS
RESPIRATION RATE: 16 BRPM | HEIGHT: 68 IN | WEIGHT: 159 LBS | TEMPERATURE: 97.7 F | DIASTOLIC BLOOD PRESSURE: 61 MMHG | OXYGEN SATURATION: 96 % | BODY MASS INDEX: 24.1 KG/M2 | HEART RATE: 72 BPM | SYSTOLIC BLOOD PRESSURE: 103 MMHG

## 2022-07-19 DIAGNOSIS — F03.B18 MODERATE DEMENTIA WITH BEHAVIORAL DISTURBANCE (H): ICD-10-CM

## 2022-07-19 DIAGNOSIS — S99.921A INJURY OF RIGHT GREAT TOE, INITIAL ENCOUNTER: Primary | ICD-10-CM

## 2022-07-19 NOTE — LETTER
"    7/19/2022        RE: Rell Erazo  [facility]  [address]  [facility] [address]        Chief Complaint: asked to check resident's right great toe--staff think he injured it    HPI:    Rell Erazo is a 89 year old  (6/5/1928), who is being seen today for an episodic care visit at [facility]. Today's concern is:     Injury of right great toe, initial encounter  Moderate dementia with behavioral disturbance (H)      TODAY DURING EXAM/ROS:  basically non verbal , would not let me take shoe off but allowed staff to do so--see below    /61   Pulse 72   Temp 97.7  F (36.5  C)   Resp 16   Ht 1.727 m (5' 8\")   Wt 72.1 kg (159 lb)   SpO2 96%   BMI 24.18 kg/m    SUBJECTIVE/OBJECTIVE DATA:  GENERAL APPEARANCE:  Alert,oriented x self, is in no distress.  No distressed RESP pattern.* right great toe with nail hematoma.no sign infection. No new  focal neurological deficits.         Current Outpatient Medications   Medication Sig Dispense Refill     ACE/ARB/ARNI NOT PRESCRIBED (INTENTIONAL) Please choose reason not prescribed from choices below.       acetaminophen (TYLENOL) 500 MG tablet Take 1,000 mg by mouth 2 times daily OA       calcium carbonate (TUMS) 500 MG chewable tablet Take 1 chew tab by mouth 2 times daily (with meals)       finasteride (PROSCAR) 5 MG tablet Take 5 mg by mouth daily       tamsulosin (FLOMAX) 0.4 MG capsule Take 0.4 mg by mouth every evening       traZODone (DESYREL) 50 MG tablet Take 0.5 tablets (25 mg) by mouth At Bedtime 30 tablet 1     Vitamin D, Cholecalciferol, 25 MCG (1000 UT) TABS Take 1,000 Units by mouth daily       ASSESSMENT / PLAN:  (S98.924H) Injury of right great toe, initial encounter  (primary encounter diagnosis)  Comment/Plan: appears to have stubbed or hit toe on something. No sign infection , should resolve slowly monitor no new orders    (F03.91) Moderate dementia with behavioral disturbance " (H)  Comment/Plan: at baseline, sometimes allows exam, other times not .    Electronically Signed by:  ADRYAN Castellanos CNP        Sincerely,        ADRYAN Castellanos CNP

## 2022-07-19 NOTE — PROGRESS NOTES
"Chief Complaint: asked to check resident's right great toe--staff think he injured it    HPI:    Rell Erazo is a 89 year old  (6/5/1928), who is being seen today for an episodic care visit at [facility]. Today's concern is:     Injury of right great toe, initial encounter  Moderate dementia with behavioral disturbance (H)      TODAY DURING EXAM/ROS:  basically non verbal , would not let me take shoe off but allowed staff to do so--see below    /61   Pulse 72   Temp 97.7  F (36.5  C)   Resp 16   Ht 1.727 m (5' 8\")   Wt 72.1 kg (159 lb)   SpO2 96%   BMI 24.18 kg/m    SUBJECTIVE/OBJECTIVE DATA:  GENERAL APPEARANCE:  Alert,oriented x self, is in no distress.  No distressed RESP pattern.* right great toe with nail hematoma.no sign infection. No new  focal neurological deficits.         Current Outpatient Medications   Medication Sig Dispense Refill     ACE/ARB/ARNI NOT PRESCRIBED (INTENTIONAL) Please choose reason not prescribed from choices below.       acetaminophen (TYLENOL) 500 MG tablet Take 1,000 mg by mouth 2 times daily OA       calcium carbonate (TUMS) 500 MG chewable tablet Take 1 chew tab by mouth 2 times daily (with meals)       finasteride (PROSCAR) 5 MG tablet Take 5 mg by mouth daily       tamsulosin (FLOMAX) 0.4 MG capsule Take 0.4 mg by mouth every evening       traZODone (DESYREL) 50 MG tablet Take 0.5 tablets (25 mg) by mouth At Bedtime 30 tablet 1     Vitamin D, Cholecalciferol, 25 MCG (1000 UT) TABS Take 1,000 Units by mouth daily       ASSESSMENT / PLAN:  (S99.927O) Injury of right great toe, initial encounter  (primary encounter diagnosis)  Comment/Plan: appears to have stubbed or hit toe on something. No sign infection , should resolve slowly monitor no new orders    (F03.91) Moderate dementia with behavioral disturbance (H)  Comment/Plan: at baseline, sometimes allows exam, other times not .    Electronically Signed by:  ADRYAN Castellanos " CNP

## 2022-07-26 ENCOUNTER — ASSISTED LIVING VISIT (OUTPATIENT)
Dept: GERIATRICS | Facility: CLINIC | Age: 87
End: 2022-07-26
Payer: MEDICARE

## 2022-07-26 VITALS
RESPIRATION RATE: 16 BRPM | HEART RATE: 72 BPM | TEMPERATURE: 97.7 F | HEIGHT: 68 IN | BODY MASS INDEX: 24.1 KG/M2 | SYSTOLIC BLOOD PRESSURE: 103 MMHG | OXYGEN SATURATION: 96 % | DIASTOLIC BLOOD PRESSURE: 61 MMHG | WEIGHT: 159 LBS

## 2022-07-26 DIAGNOSIS — R21 RASH: Primary | ICD-10-CM

## 2022-07-26 DIAGNOSIS — F03.B18 MODERATE DEMENTIA WITH BEHAVIORAL DISTURBANCE (H): ICD-10-CM

## 2022-07-26 NOTE — LETTER
"    7/26/2022        RE: Rell Erazo  [facility]  [address]  [facility] [address]        CC:  Called that has \"rash\" on left upper chest--no pain, or itching  S/O: saw pt today  Area is lighter and less red than yesterday. Pic below is form yesterday.  Pt allowed exam with RN.       ASSESSMENT / PLAN:  (R21) Rash: left upper chest 7/25/22  (primary encounter diagnosis)  Comment/Plan: ?etiology--appears to be fading, will monitor with no tx at this time.    (F03.91) Moderate dementia with behavioral disturbance (H)  Comment/Plan: at baseline, in 24 hr memory care    ELECTRONICALLY SIGNED:    Selam Fowler RN, CNP        Sincerely,        ADRYAN Castellanos CNP      "

## 2022-09-13 ENCOUNTER — OFFICE VISIT (OUTPATIENT)
Dept: DERMATOLOGY | Facility: CLINIC | Age: 87
End: 2022-09-13
Payer: MEDICARE

## 2022-09-13 ENCOUNTER — ASSISTED LIVING VISIT (OUTPATIENT)
Dept: GERIATRICS | Facility: CLINIC | Age: 87
End: 2022-09-13

## 2022-09-13 VITALS
RESPIRATION RATE: 16 BRPM | HEIGHT: 68 IN | BODY MASS INDEX: 24.1 KG/M2 | TEMPERATURE: 97.7 F | DIASTOLIC BLOOD PRESSURE: 61 MMHG | HEART RATE: 72 BPM | SYSTOLIC BLOOD PRESSURE: 103 MMHG | OXYGEN SATURATION: 96 % | WEIGHT: 159 LBS

## 2022-09-13 DIAGNOSIS — L82.1 SEBORRHEIC KERATOSIS: ICD-10-CM

## 2022-09-13 DIAGNOSIS — R45.1 AGITATION: ICD-10-CM

## 2022-09-13 DIAGNOSIS — C44.320 SQUAMOUS CELL CARCINOMA OF SKIN OF FACE: ICD-10-CM

## 2022-09-13 DIAGNOSIS — F03.B18 MODERATE DEMENTIA WITH BEHAVIORAL DISTURBANCE (H): Primary | ICD-10-CM

## 2022-09-13 DIAGNOSIS — D22.9 NEVUS: Primary | ICD-10-CM

## 2022-09-13 DIAGNOSIS — D48.5 NEOPLASM OF UNCERTAIN BEHAVIOR OF SKIN: ICD-10-CM

## 2022-09-13 DIAGNOSIS — L81.4 LENTIGO: ICD-10-CM

## 2022-09-13 DIAGNOSIS — L98.9 SKIN LESION: Primary | ICD-10-CM

## 2022-09-13 DIAGNOSIS — F03.B18 MODERATE DEMENTIA WITH BEHAVIORAL DISTURBANCE (H): ICD-10-CM

## 2022-09-13 PROBLEM — N20.0 CALCULUS OF KIDNEY: Status: ACTIVE | Noted: 2022-09-13

## 2022-09-13 PROCEDURE — 88305 TISSUE EXAM BY PATHOLOGIST: CPT | Performed by: DERMATOLOGY

## 2022-09-13 PROCEDURE — 11102 TANGNTL BX SKIN SINGLE LES: CPT | Performed by: PHYSICIAN ASSISTANT

## 2022-09-13 PROCEDURE — 99213 OFFICE O/P EST LOW 20 MIN: CPT | Mod: 25 | Performed by: PHYSICIAN ASSISTANT

## 2022-09-13 PROCEDURE — 11103 TANGNTL BX SKIN EA SEP/ADDL: CPT | Performed by: PHYSICIAN ASSISTANT

## 2022-09-13 RX ORDER — LORAZEPAM 1 MG/1
0.5 TABLET ORAL DAILY PRN
Qty: 10 TABLET | Refills: 1 | Status: SHIPPED | OUTPATIENT
Start: 2022-09-13

## 2022-09-13 NOTE — PATIENT INSTRUCTIONS
Wound Care Instructions     FOR SUPERFICIAL WOUNDS     [facility]  [phone]                 AFTER 24 HOURS YOU SHOULD REMOVE THE BANDAGE AND BEGIN DAILY DRESSING CHANGES AS FOLLOWS:     1) Remove Dressing.     2) Clean and dry the area with tap water using a Q-tip or sterile gauze pad.     3) Apply Vaseline, Aquaphor, Polysporin ointment or Bacitracin ointment over entire wound.  Do NOT use Neosporin ointment.     4) Cover the wound with a band-aid, or a sterile non-stick gauze pad and micropore paper tape    REPEAT THESE INSTRUCTIONS AT LEAST ONCE A DAY UNTIL THE WOUND HAS COMPLETELY HEALED.    It is an old wives tale that a wound heals better when it is exposed to air and allowed to dry out. The wound will heal faster with a better cosmetic result if it is kept moist with ointment and covered with a bandage.    **Do not let the wound dry out.**    Supplies Needed:      *Cotton tipped applicators (Q-tips)    *Vaseline, Aquaphor, Polysporin or Bacitracin Ointment (NOT NEOSPORIN)    *Band-aids or non-stick gauze pads and micropore paper tape.      PATIENT INFORMATION:    During the healing process you will notice a number of changes. All wounds develop a small halo of redness surrounding the wound.  This means healing is occurring. Severe itching with extensive redness usually indicates sensitivity to the ointment or bandage tape used to dress the wound.  You should call our office if this develops.      Swelling  and/or discoloration around your surgical site is common, particularly when performed around the eye.    All wounds normally drain.  The larger the wound the more drainage there will be.  After 7-10 days, you will notice the wound beginning to shrink and new skin will begin to grow.  The wound is healed when you can see skin has formed over the entire area.  A healed wound has a healthy, shiny look to the surface and is red to dark pink in color to normalize.  Wounds may  take approximately 4-6 weeks to heal.  Larger wounds may take 6-8 weeks.  After the wound is healed you may discontinue dressing changes.    You may experience a sensation of tightness as your wound heals. This is normal and will gradually subside.    Your healed wound may be sensitive to temperature changes. This sensitivity improves with time, but if you re having a lot of discomfort, try to avoid temperature extremes.    Patients frequently experience itching after their wound appears to have healed because of the continue healing under the skin.  Plain Vaseline will help relieve the itching.      POSSIBLE COMPLICATIONS    BLEEDING:    Leave the bandage in place.  Use tightly rolled up gauze or a cloth to apply direct pressure over the bandage for 30  minutes.  Reapply pressure for an additional 30 minutes if necessary  Use additional gauze and tape to maintain pressure once the bleeding has stopped.

## 2022-09-13 NOTE — PROGRESS NOTES
"CC:  Called that has \"rash\" on left upper chest--no pain, or itching  S/O: saw pt today  Area is lighter and less red than yesterday. Pic below is form yesterday.  Pt allowed exam with RN.       ASSESSMENT / PLAN:  (R21) Rash: left upper chest 7/25/22  (primary encounter diagnosis)  Comment/Plan: ?etiology--appears to be fading, will monitor with no tx at this time.    (F03.91) Moderate dementia with behavioral disturbance (H)  Comment/Plan: at baseline, in 24 hr memory care    ELECTRONICALLY SIGNED:    Selam Fowler RN, CNP  " Prior [place] has been started at this time. Awaiting further instructions.

## 2022-09-13 NOTE — PROGRESS NOTES
HPI:   Chief complaints: Rell Erazo is a pleasant 89 year old male who presents for evaluation of new non healing sore on the forehead. It has been present for about 1 month. He has a history of BCC in the past. He would like his upper body checked today as well.       PHYSICAL EXAM:    There were no vitals taken for this visit.  Skin exam performed as follows: Type 2 skin. Mood appropriate  Alert and Oriented X 3. Well developed, well nourished in no distress.  General appearance: Normal  Head including face: Normal  Eyes: conjunctiva and lids: Normal  Mouth: Lips, teeth, gums: Normal  Neck: Normal  Cardiovascular: Exam of peripheral vascular system by observation for swelling, varicosities, edema: Normal  Right upper extremity: Normal  Left upper extremity: Normal  Right lower extremity: Normal  Left lower extremity: Normal  Skin: Scalp and body hair: See below    10 mm ulcerated papule on the left glabella  22 cm pink plaque on the left chest  Brown and tan macules and papules; keratotic papules on the left chest     ASSESSMENT/PLAN:     1. R/o keratoacanthoma on the left glabella. Shave biopsy performed.  Area cleaned and anesthetized with 1% lidocaine with epinephrine.  Dermablade used to remove the lesion and sent to pathology. Bleeding was cauterized. Pt tolerated procedure well with no complications.   2. R/o BCC on the left chest  3. Nevi, lentigos, SKs on the back, arms, chest, abdomen - advised benign no treatment needed           Follow-up: pending path  CC:   Scribed By: Lorie Last, MS, PASammieC

## 2022-09-13 NOTE — LETTER
9/13/2022         RE: Rell Erazo  [facility]  [address]  [facility] [address]        Dear Colleague,    Thank you for referring your patient, Rell Erazo, to the [facility]. Please see a copy of my visit note below.    HPI:   Chief complaints: Rell Erazo is a pleasant 89 year old male who presents for evaluation of new non healing sore on the forehead. It has been present for about 1 month. He has a history of BCC in the past. He would like his upper body checked today as well.       PHYSICAL EXAM:    There were no vitals taken for this visit.  Skin exam performed as follows: Type 2 skin. Mood appropriate  Alert and Oriented X 3. Well developed, well nourished in no distress.  General appearance: Normal  Head including face: Normal  Eyes: conjunctiva and lids: Normal  Mouth: Lips, teeth, gums: Normal  Neck: Normal  Cardiovascular: Exam of peripheral vascular system by observation for swelling, varicosities, edema: Normal  Right upper extremity: Normal  Left upper extremity: Normal  Right lower extremity: Normal  Left lower extremity: Normal  Skin: Scalp and body hair: See below    10 mm ulcerated papule on the left glabella  22 cm pink plaque on the left chest  Brown and tan macules and papules; keratotic papules on the left chest     ASSESSMENT/PLAN:     1. R/o keratoacanthoma on the left glabella. Shave biopsy performed.  Area cleaned and anesthetized with 1% lidocaine with epinephrine.  Dermablade used to remove the lesion and sent to pathology. Bleeding was cauterized. Pt tolerated procedure well with no complications.   2. R/o BCC on the left chest  3. Nevi, lentigos, SKs on the back, arms, chest, abdomen - advised benign no treatment needed           Follow-up: pending path  CC:   Scribed By: Lorie Last, MS, PA-C          Again, thank you for allowing me to participate in the care of your patient.        Sincerely,        Lorie  SWETA Art PA-C

## 2022-09-13 NOTE — PROGRESS NOTES
"Chief Complaint: msg by staff that a new lesion noted on forehead last week    HPI:    Rell Erazo is a 89 year old  (6/5/1928), who is being seen today for an episodic care visit at [facility]. Today's concern is:     Skin lesion  Squamous cell carcinoma of skin of face  Moderate dementia with behavioral disturbance (H)      /61   Pulse 72   Temp 97.7  F (36.5  C)   Resp 16   Ht 1.727 m (5' 8\")   Wt 72.1 kg (159 lb)   SpO2 96%   BMI 24.18 kg/m    TODAY'S EXAM AND ROS:  SUBJECTIVE: mainly non verbal. smiling today and allowing exam. Says has no pain, feels \"fine\".   OBJECTIVE DATA:  GENERAL APPEARANCE:  Alert,oriented x self in no distress. Lungs CTA ,RRR Abdomen is soft, +BTS. No new focal neurological deficits.  MID FOREHEAD BETWEEN EYE BROWS HAS ~~1CM OPEN LESION, SHINY ROUNDED EDGES--APPEARS TO BE CANCER    Current Outpatient Medications   Medication Sig Dispense Refill     ACE/ARB/ARNI NOT PRESCRIBED (INTENTIONAL) Please choose reason not prescribed from choices below.       acetaminophen (TYLENOL) 500 MG tablet Take 1,000 mg by mouth 2 times daily OA       calcium carbonate (TUMS) 500 MG chewable tablet Take 1 chew tab by mouth 2 times daily (with meals)       finasteride (PROSCAR) 5 MG tablet Take 5 mg by mouth daily       LORazepam (ATIVAN) 1 MG tablet Take 0.5 tablets (0.5 mg) by mouth daily as needed for agitation or anxiety 10 tablet 1     tamsulosin (FLOMAX) 0.4 MG capsule Take 0.4 mg by mouth every evening       traZODone (DESYREL) 50 MG tablet Take 0.5 tablets (25 mg) by mouth At Bedtime 30 tablet 1     Vitamin D, Cholecalciferol, 25 MCG (1000 UT) TABS Take 1,000 Units by mouth daily       ASSESSMENT / PLAN:  (L98.9) Skin lesion  (primary encounter diagnosis)   (C44.320) Squamous cell carcinoma of skin of face: forehead  Comment/Plan:  needed prompt eval and able to get pt into see Lorie Last PA-C who agreed it cancerous--see her note. F/U per Derm.  appreciate them fitting pt in " so quickly today. Actually pathology pending.    (F03.91) Moderate dementia with behavioral disturbance (H)  Comment/Plan: at baseline--in memory car as needs help with all ADL's,    **Nurse Reanna updated daughter who brought pt to appt today at [facility]**    Electronically Signed by:  ADRYAN Castellanos CNP

## 2022-09-13 NOTE — LETTER
"    9/13/2022        RE: Rell Erazo  [facility]  [address] [facility] [address]        Chief Complaint: msg by staff that a new lesion noted on forehead last week    HPI:    Rell Erazo is a 89 year old  (6/5/1928), who is being seen today for an episodic care visit at [facility]. Today's concern is:     Skin lesion  Squamous cell carcinoma of skin of face  Moderate dementia with behavioral disturbance (H)      /61   Pulse 72   Temp 97.7  F (36.5  C)   Resp 16   Ht 1.727 m (5' 8\")   Wt 72.1 kg (159 lb)   SpO2 96%   BMI 24.18 kg/m    TODAY'S EXAM AND ROS:  SUBJECTIVE: mainly non verbal. smiling today and allowing exam. Says has no pain, feels \"fine\".   OBJECTIVE DATA:  GENERAL APPEARANCE:  Alert,oriented x self in no distress. Lungs CTA ,RRR Abdomen is soft, +BTS. No new focal neurological deficits.  MID FOREHEAD BETWEEN EYE BROWS HAS ~~1CM OPEN LESION, SHINY ROUNDED EDGES--APPEARS TO BE CANCER    Current Outpatient Medications   Medication Sig Dispense Refill     ACE/ARB/ARNI NOT PRESCRIBED (INTENTIONAL) Please choose reason not prescribed from choices below.       acetaminophen (TYLENOL) 500 MG tablet Take 1,000 mg by mouth 2 times daily OA       calcium carbonate (TUMS) 500 MG chewable tablet Take 1 chew tab by mouth 2 times daily (with meals)       finasteride (PROSCAR) 5 MG tablet Take 5 mg by mouth daily       LORazepam (ATIVAN) 1 MG tablet Take 0.5 tablets (0.5 mg) by mouth daily as needed for agitation or anxiety 10 tablet 1     tamsulosin (FLOMAX) 0.4 MG capsule Take 0.4 mg by mouth every evening       traZODone (DESYREL) 50 MG tablet Take 0.5 tablets (25 mg) by mouth At Bedtime 30 tablet 1     Vitamin D, Cholecalciferol, 25 MCG (1000 UT) TABS Take 1,000 Units by mouth daily       ASSESSMENT / PLAN:  (L98.9) Skin lesion  (primary encounter diagnosis)   (C44.320) Squamous cell carcinoma of skin of face: forehead  Comment/Plan:  needed prompt eval and able to get " pt into see Lorie Last PA-C who agreed it cancerous--see her note. F/U per Derm.  appreciate them fitting pt in so quickly today. Actually pathology pending.    (F03.91) Moderate dementia with behavioral disturbance (H)  Comment/Plan: at baseline--in memory car as needs help with all ADL's,    **Nurse Reanna updated daughter who brought pt to appt today at [facility]**    Electronically Signed by:  ADRAYN Castellanos CNP        Sincerely,        ADRYAN Castellanos CNP

## 2022-09-15 ENCOUNTER — TELEPHONE (OUTPATIENT)
Dept: DERMATOLOGY | Facility: CLINIC | Age: 87
End: 2022-09-15

## 2022-09-15 LAB
PATH REPORT.COMMENTS IMP SPEC: ABNORMAL
PATH REPORT.COMMENTS IMP SPEC: ABNORMAL
PATH REPORT.COMMENTS IMP SPEC: YES
PATH REPORT.FINAL DX SPEC: ABNORMAL
PATH REPORT.GROSS SPEC: ABNORMAL
PATH REPORT.MICROSCOPIC SPEC OTHER STN: ABNORMAL
PATH REPORT.RELEVANT HX SPEC: ABNORMAL

## 2022-09-15 NOTE — LETTER
[facility]  [address]  [address]  [phone]        9/16/2022       Rell Erazo  [place] [id number]  [facility] [address]      Dear Rell:    You are scheduled for Mohs Surgery on: Thursday December 22, 2022 at 8 am and January 12, 2023 at 9 am.    Please check in at [facility], [address].     You don't need to arrive more than 5-10 minutes prior to your appointment time.     Be sure to eat a good breakfast and bathe and wash your hair prior to surgery.     If you are taking any anti-coagulants that are prescribed by your Doctor (such as Coumadin/Warfarin, Plavix, Aspirin, Ibuprofen), please continue taking them.     However, if you are taking anti-coagulants over the counter without a Doctor's order for a medical condition, please discontinue them 10 days prior to surgery.     Please wear loose comfortable clothing as it could possibly be 4-6 hours until your surgery is completed depending upon how many layers of tissue need to be removed.      Thank you,    Timmy Mora MD

## 2022-09-15 NOTE — TELEPHONE ENCOUNTER
----- Message from Lorie Last PA-C sent at 9/15/2022  2:29 PM CDT -----  Left glabella, left chest BCC please schedule mohs/excision

## 2022-09-15 NOTE — TELEPHONE ENCOUNTER
Selam young called back and was given Lorie's message below.  She is going to discuss with sister and call back tomorrow about appts.  Is currently scheduled for [facility] on 12/22 and 1/12/23.  Will hold off on sending out letter until hear from Selam.    Mail [facility] information to   Selam Erazo  [address] [place] [address]    Cherelle COLEMAN RN  [facility]  [phone]

## 2022-09-15 NOTE — TELEPHONE ENCOUNTER
Left message for pt to call dermatology nurse at [phone].  Advised there is no vm at this number, if no answer to call back at a later time.    See Lorie's message below.  Center of forehead (glabella) and left chest are SCC.    Cherelle COLEMAN RN  [facility]  [phone]

## 2022-09-16 NOTE — TELEPHONE ENCOUNTER
Mohs letter and information mailed to daughter Selam at the address in below note.    Cherelle COLEMAN RN  [facility]  [phone]

## 2022-09-28 ENCOUNTER — ASSISTED LIVING VISIT (OUTPATIENT)
Dept: GERIATRICS | Facility: CLINIC | Age: 87
End: 2022-09-28
Payer: MEDICARE

## 2022-09-28 VITALS — RESPIRATION RATE: 16 BRPM

## 2022-09-28 DIAGNOSIS — F03.B18 MODERATE DEMENTIA WITH BEHAVIORAL DISTURBANCE (H): ICD-10-CM

## 2022-09-28 DIAGNOSIS — C44.509: Primary | ICD-10-CM

## 2022-09-28 DIAGNOSIS — C44.320 SQUAMOUS CELL CARCINOMA OF SKIN OF FACE: ICD-10-CM

## 2022-09-28 NOTE — PROGRESS NOTES
Chief Complaint: review of biopsy sites from Derm visit on 9/13/22    HPI:    Rell Erazo is a 89 year old  (6/5/1928), who is being seen today for an episodic care visit at [facility]. Today's concern is: biopsies + for squamous cell cancer     Cancer of skin of chest  Squamous cell carcinoma of skin of face  Moderate dementia with behavioral disturbance      Resp 16   TODAY'S EXAM AND ROS:  SUBJECTIVE:eating BF, no complaints--allowed exam  OBJECTIVE DATA:  GENERAL APPEARANCE:  Alert,oriented x self in no distress. Skin: mid forehead and left chest with open pink, wounds--covered with drsgs--no sign of redness or infection when checked..     Current Outpatient Medications   Medication Sig Dispense Refill     ACE/ARB/ARNI NOT PRESCRIBED (INTENTIONAL) Please choose reason not prescribed from choices below.       acetaminophen (TYLENOL) 500 MG tablet Take 1,000 mg by mouth 2 times daily OA       calcium carbonate (TUMS) 500 MG chewable tablet Take 1 chew tab by mouth 2 times daily (with meals)       finasteride (PROSCAR) 5 MG tablet Take 5 mg by mouth daily       LORazepam (ATIVAN) 1 MG tablet Take 0.5 tablets (0.5 mg) by mouth daily as needed for agitation or anxiety 10 tablet 1     tamsulosin (FLOMAX) 0.4 MG capsule Take 0.4 mg by mouth every evening       traZODone (DESYREL) 50 MG tablet Take 0.5 tablets (25 mg) by mouth At Bedtime 30 tablet 1     Vitamin D, Cholecalciferol, 25 MCG (1000 UT) TABS Take 1,000 Units by mouth daily       9/13/2022  A. Skin, L glabella:  - Squamous cell carcinoma, well-differentiated, extending to the deep margin - (see description)   B. Skin, L chest:  - Squamous cell carcinoma in situ, extending to the lateral margin - (see description)    ASSESSMENT / PLAN:  (C44.509) Cancer of skin of chest  (primary encounter diagnosis)   (C44.320) Squamous cell carcinoma of skin of face: forehead  Comment/Plan: per Derm notes, they are recommending a MOHS  procedure and setting up time with family-in December or January.    (F03.91) Moderate dementia with behavioral disturbance  Comment/Plan: at baseline, in memory are, monitor.        Electronically Signed by:  ADRYAN Castellanos CNP

## 2022-09-28 NOTE — LETTER
9/28/2022        RE: Rell Erazo  [facility]  [address] [facility] [address]        Chief Complaint: review of biopsy sites from Derm visit on 9/13/22    HPI:    Rell Erazo is a 89 year old  (6/5/1928), who is being seen today for an episodic care visit at [facility]. Today's concern is: biopsies + for squamous cell cancer     Cancer of skin of chest  Squamous cell carcinoma of skin of face  Moderate dementia with behavioral disturbance      Resp 16   TODAY'S EXAM AND ROS:  SUBJECTIVE:eating BF, no complaints--allowed exam  OBJECTIVE DATA:  GENERAL APPEARANCE:  Alert,oriented x self in no distress. Skin: mid forehead and left chest with open pink, wounds--covered with drsgs--no sign of redness or infection when checked..     Current Outpatient Medications   Medication Sig Dispense Refill     ACE/ARB/ARNI NOT PRESCRIBED (INTENTIONAL) Please choose reason not prescribed from choices below.       acetaminophen (TYLENOL) 500 MG tablet Take 1,000 mg by mouth 2 times daily OA       calcium carbonate (TUMS) 500 MG chewable tablet Take 1 chew tab by mouth 2 times daily (with meals)       finasteride (PROSCAR) 5 MG tablet Take 5 mg by mouth daily       LORazepam (ATIVAN) 1 MG tablet Take 0.5 tablets (0.5 mg) by mouth daily as needed for agitation or anxiety 10 tablet 1     tamsulosin (FLOMAX) 0.4 MG capsule Take 0.4 mg by mouth every evening       traZODone (DESYREL) 50 MG tablet Take 0.5 tablets (25 mg) by mouth At Bedtime 30 tablet 1     Vitamin D, Cholecalciferol, 25 MCG (1000 UT) TABS Take 1,000 Units by mouth daily       9/13/2022  A. Skin, L glabella:  - Squamous cell carcinoma, well-differentiated, extending to the deep margin - (see description)   B. 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    (F03.91) Moderate dementia with behavioral disturbance  Comment/Plan: at baseline, in memory are, monitor.        Electronically Signed by:  ADRYAN Castellanos CNP        Sincerely,        ADRYAN Castellanos CNP

## 2022-10-03 VITALS
WEIGHT: 159 LBS | RESPIRATION RATE: 16 BRPM | BODY MASS INDEX: 24.1 KG/M2 | DIASTOLIC BLOOD PRESSURE: 61 MMHG | HEART RATE: 72 BPM | TEMPERATURE: 97.7 F | HEIGHT: 68 IN | SYSTOLIC BLOOD PRESSURE: 103 MMHG | OXYGEN SATURATION: 96 %

## 2022-10-03 NOTE — PROGRESS NOTES
"Chief Complaint: asked to check pt by staff and update daughter, Selam    HPI:    Rell Erazo is a 89 year old  (6/5/1928), who is being seen today for an episodic care visit at [facility]. Today's concern is: no new concerns per staff, both surgical biopsy wounds healing with no sign infection     Cancer of skin of chest  Squamous cell carcinoma of skin of face  Moderate dementia with behavioral disturbance      /61   Pulse 72   Temp 97.7  F (36.5  C)   Resp 16   Ht 1.727 m (5' 8\")   Wt 72.1 kg (159 lb)   SpO2 96%   BMI 24.18 kg/m    TODAY'S EXAM AND ROS:   pt is nonverbal--allowed checking forehead and chest biopsy sites.   Both are moist, healing, no sign infection.. Lungs are CTA and non labored. No new focal neurological issues noted.  FREIRE, getting ready for BF     Current Outpatient Medications   Medication Sig Dispense Refill     ACE/ARB/ARNI NOT PRESCRIBED (INTENTIONAL) Please choose reason not prescribed from choices below.       acetaminophen (TYLENOL) 500 MG tablet Take 1,000 mg by mouth 2 times daily OA       calcium carbonate (TUMS) 500 MG chewable tablet Take 1 chew tab by mouth 2 times daily (with meals)       finasteride (PROSCAR) 5 MG tablet Take 5 mg by mouth daily       LORazepam (ATIVAN) 1 MG tablet Take 0.5 tablets (0.5 mg) by mouth daily as needed for agitation or anxiety 10 tablet 1     tamsulosin (FLOMAX) 0.4 MG capsule Take 0.4 mg by mouth every evening       traZODone (DESYREL) 50 MG tablet Take 0.5 tablets (25 mg) by mouth At Bedtime 30 tablet 1     Vitamin D, Cholecalciferol, 25 MCG (1000 UT) TABS Take 1,000 Units by mouth daily       ASSESSMENT / PLAN:  (C44.509) Cancer of skin of chest  (primary encounter diagnosis)   (C44.320) Squamous cell carcinoma of skin of face: forehead  Comment/Plan: healing.  Cont same cares or per Derm.  Derm is working with family re scheduling further TX such as MOHS.    (F03.B18) Moderate dementia with " behavioral disturbance  Comment/Plan: at baseline.    **I called and left msg on Daughter Selam's personal VM--per staff she has questions that are Derm related and transportation.  I left msg to reach out to DERM, rec'd that she schedule the procedure they suggest as it may be a few month out and we can always cancel it if she wishes or pt declines precipitously.  Also gave her info transportation costs that nurse sent to me**    Electronically Signed by:  ADRYAN Castellanos CNP

## 2022-10-04 ENCOUNTER — ASSISTED LIVING VISIT (OUTPATIENT)
Dept: GERIATRICS | Facility: CLINIC | Age: 87
End: 2022-10-04
Payer: MEDICARE

## 2022-10-04 DIAGNOSIS — F03.B18 MODERATE DEMENTIA WITH BEHAVIORAL DISTURBANCE (H): ICD-10-CM

## 2022-10-04 DIAGNOSIS — C44.509: Primary | ICD-10-CM

## 2022-10-04 DIAGNOSIS — C44.320 SQUAMOUS CELL CARCINOMA OF SKIN OF FACE: ICD-10-CM

## 2022-10-04 NOTE — LETTER
"    10/4/2022        RE: Rell Erazo  [facility]  [address]  [facility] [address]        Chief Complaint: asked to check pt by staff and update daughter, Selam    HPI:    Rell Erazo is a 89 year old  (6/5/1928), who is being seen today for an episodic care visit at [facility]. Today's concern is: no new concerns per staff, both surgical biopsy wounds healing with no sign infection     Cancer of skin of chest  Squamous cell carcinoma of skin of face  Moderate dementia with behavioral disturbance      /61   Pulse 72   Temp 97.7  F (36.5  C)   Resp 16   Ht 1.727 m (5' 8\")   Wt 72.1 kg (159 lb)   SpO2 96%   BMI 24.18 kg/m    TODAY'S EXAM AND ROS:   pt is nonverbal--allowed checking forehead and chest biopsy sites.   Both are moist, healing, no sign infection.. Lungs are CTA and non labored. No new focal neurological issues noted.  FREIRE, getting ready for BF     Current Outpatient Medications   Medication Sig Dispense Refill     ACE/ARB/ARNI NOT PRESCRIBED (INTENTIONAL) Please choose reason not prescribed from choices below.       acetaminophen (TYLENOL) 500 MG tablet Take 1,000 mg by mouth 2 times daily OA       calcium carbonate (TUMS) 500 MG chewable tablet Take 1 chew tab by mouth 2 times daily (with meals)       finasteride (PROSCAR) 5 MG tablet Take 5 mg by mouth daily       LORazepam (ATIVAN) 1 MG tablet Take 0.5 tablets (0.5 mg) by mouth daily as needed for agitation or anxiety 10 tablet 1     tamsulosin (FLOMAX) 0.4 MG capsule Take 0.4 mg by mouth every evening       traZODone (DESYREL) 50 MG tablet Take 0.5 tablets (25 mg) by mouth At Bedtime 30 tablet 1     Vitamin D, Cholecalciferol, 25 MCG (1000 UT) TABS Take 1,000 Units by mouth daily       ASSESSMENT / PLAN:  (C44.509) Cancer of skin of chest  (primary encounter diagnosis)   (C44.320) Squamous cell carcinoma of skin of face: forehead  Comment/Plan: healing.  Cont same " cares or per Derm.  Derm is working with family re scheduling further TX such as MOHS.    (F03.B18) Moderate dementia with behavioral disturbance  Comment/Plan: at baseline.    **I called and left msg on Daughter Selam's personal VM--per staff she has questions that are Derm related and transportation.  I left msg to reach out to DERM, rec'd that she schedule the procedure they suggest as it may be a few month out and we can always cancel it if she wishes or pt declines precipitously.  Also gave her info transportation costs that nurse sent to me**    Electronically Signed by:  ADRYAN Castellanos CNP        Sincerely,        ADRYAN Castellanos CNP

## 2022-11-02 ENCOUNTER — HOSPITAL ENCOUNTER (EMERGENCY)
Facility: CLINIC | Age: 87
Discharge: HOME | End: 2022-11-02
Attending: PHYSICIAN ASSISTANT | Admitting: PHYSICIAN ASSISTANT
Payer: MEDICARE

## 2022-11-02 ENCOUNTER — TELEPHONE (OUTPATIENT)
Dept: GERIATRICS | Facility: CLINIC | Age: 87
End: 2022-11-02

## 2022-11-02 VITALS
SYSTOLIC BLOOD PRESSURE: 129 MMHG | OXYGEN SATURATION: 95 % | TEMPERATURE: 98.4 F | HEART RATE: 71 BPM | DIASTOLIC BLOOD PRESSURE: 95 MMHG | RESPIRATION RATE: 15 BRPM

## 2022-11-02 DIAGNOSIS — R53.83 LETHARGY: ICD-10-CM

## 2022-11-02 DIAGNOSIS — R63.0 DECREASED APPETITE: ICD-10-CM

## 2022-11-02 PROCEDURE — 99283 EMERGENCY DEPT VISIT LOW MDM: CPT

## 2022-11-02 ASSESSMENT — ACTIVITIES OF DAILY LIVING (ADL)
ADLS_ACUITY_SCORE: 37
ADLS_ACUITY_SCORE: 37

## 2022-11-02 NOTE — ED TRIAGE NOTES
Covid-19 positive October 20th. Not eaten in 3 days. Not getting out of bed. Has taken a few sips of water. Daughter, Gabriel, requests patient come to ED for treatment. Patient is bed bound and uses gary lift. Patient has been refusing shower. Patient reports he is unsure why he is brought to ED. Denies pain/SOB/CP. Denies feeling increased weakness. Denies not eating/drinking. Overall has no complaints.

## 2022-11-02 NOTE — TELEPHONE ENCOUNTER
"[facility] Triage Nurse Telephone Encounter    Provider: ADRYAN Mckinney CNP    Facility: [facility]  Facility Type:  AL    Caller: Shannan  Call Back Number: [phone]    Allergies:    Allergies   Allergen Reactions     Cat Hair Extract      Other reaction(s): Other (see comments)  Runny nose        Reason for call: Nurse called to report that patient recently had COVID but is out of quarantine.  Patient continues to not eat or want to get out of bed.  When asked patient states \"cause I'm tired\".  Patient has dementia.  Only fluids that patient is drinking is the water he is given with pills.  Family was updated and they are wanting him to go to the hospital for eval.  Family feels he is dehydrated at this point.  Lab company only comes to facility on Tuesdays.      Verbal Order/Direction given by Provider: Send to ER for eval per family request.    Provider giving Order:  Ham Mcclellan PA-C    Verbal Order given to: Shannan Adame RN      "

## 2022-11-02 NOTE — ED NOTES
Bed: ED15  Expected date:   Expected time:   Means of arrival:   Comments:  Alicia - 513 - 89 M weak not eating eta 1200

## 2022-11-02 NOTE — ED PROVIDER NOTES
History     Chief Complaint:  Decreased Oral Intake     HPI LIMITED DUE TO DEMENTIA    HPI   Rell Erazo is a 89 year old male with a history of dementia who presents to the ED for evaluation of decreased oral intake. Per EMS, family requested that patient be evaluated in the ED as he has not been eating for the past 3 days and is not getting of out bed. He has been drinking some water. Here, patient denies any complaints. No chest pain, shortness of breath, abdominal pain, nausea. He denies weakness.    Patient is DNR/DNI.    Per Daughter:  COVID positive 10/20/22. Was confined to room. Now refusing to do anything. Think it is combination of stubbornness and not wanting to put in effort to do things such as get dressed, showering, eating. Facility unsure how long it has been since he last ate/drank.    ROS:  Review of Systems   Unable to perform ROS: Dementia       Allergies:  Cat Hair Extract     Medications:    ACE/ARB/ARNI NOT PRESCRIBED (INTENTIONAL)  acetaminophen (TYLENOL) 500 MG tablet  calcium carbonate (TUMS) 500 MG chewable tablet  finasteride (PROSCAR) 5 MG tablet  LORazepam (ATIVAN) 1 MG tablet  tamsulosin (FLOMAX) 0.4 MG capsule  traZODone (DESYREL) 50 MG tablet  Vitamin D, Cholecalciferol, 25 MCG (1000 UT) TABS        Past Medical History:    Past Medical History:   Diagnosis Date     ACP (advance care planning) 04/26/2012     Basal cell carcinoma of face 9/16/2010     BPH (benign prostatic hyperplasia)      CAD (coronary artery disease)      Carotid disease, bilateral (H)      Chronic atrial fibrillation (H)      CVA (cerebral infarction) 01/01/2013     Diverticulosis of large intestine      Hyperlipidemia LDL goal < 100 04/26/2012     Kidney stone 1981     Personal history of tobacco use, presenting hazards to health      Restless legs syndrome (RLS)      TIA (transient ischemic attack) 05/2013       Past Surgical History:    Past Surgical History:   Procedure Laterality Date     BYPASS GRAFT  ARTERY CORONARY  04/24/2012    X3 LAD,RCA, obtuse marginal     CARDIAC SURGERY  04/01/2012    x3 CABG        Family History:    family history includes Heart Disease in his brother and father; Other - See Comments in his mother.    Social History:   reports that he quit smoking about 41 years ago. He has a 2.80 pack-year smoking history. He has never used smokeless tobacco. He reports current alcohol use of about 7.0 standard drinks per week. He reports that he does not use drugs.  PCP: Selam Fowler     Physical Exam     Patient Vitals for the past 24 hrs:   BP Temp Temp src Pulse Resp SpO2   11/02/22 1221 -- 98.4  F (36.9  C) Oral -- -- 95 %   11/02/22 1219 (!) 129/95 -- -- 71 15 --        Physical Exam  Constitutional: Pleasant. Cooperative.   Eyes: Pupils equally round and reactive  HENT: Head is normal in appearance. Oropharynx is normal with moist mucus membranes.  Cardiovascular: Regular rate and rhythm and without murmurs.  Respiratory: Normal respiratory effort, lungs are clear bilaterally.  GI: Abdomen is soft, non-tender, non-distended. No guarding, rebound, or rigidity.  Musculoskeletal: No asymmetry of the lower extremities, no tenderness to palpation.   Skin: Normal, without rash.  Neurologic: No focal deficits. Alert and oriented to person.  Psychiatric: Normal affect.  Nursing notes and vital signs reviewed.    Emergency Department Course     Emergency Department Course:    Reviewed:  I reviewed nursing notes, vitals, past medical history and Care Everywhere    Assessments:   I obtained history and examined the patient as noted above.     Disposition:  The patient was discharged to home.     Impression & Plan      Medical Decision Making:  Rell Erazo is a 89 year old male who presents to the ED from [facility] facility with concern for decreased oral intake.  See HPI as above for additional details.  Vitals and physical exam as above.  Patient here has no concerns.  He denies pain, weakness.   He denies any concerns about his oral intake and is agreeable to eat and drink here.  Patient provided food and fluids which he consumes without difficulty.  Discussed plan with daughter who is patient's POA, who is comfortable with plan for discharge back to facility. No indication for further evaluation at this time. Discussed reasons to return. All questions answered. Patient discharged to home in stable condition.    Diagnosis:    ICD-10-CM    1. Decreased appetite  R63.0       2. Lethargy  R53.83            Discharge Medications:  None    11/2/2022   Julius Blair PA-C     This record was created at least in part using electronic voice recognition software, so please excuse any typographical errors.       Julius Blair PA-C  11/02/22 2931

## 2022-11-02 NOTE — ED NOTES
Pt drank 2 cans of OJ, ate 4 saltine crackers, part of cheese stick, and a few bites of apple sauce.

## 2022-11-02 NOTE — DISCHARGE INSTRUCTIONS
The cause of the symptoms are unclear at this time. Rell was able to eat and drink in the emergency department without difficulty and had no complaints. Follow up closely with a primary doctor with recurrence, return with any concerns.

## 2022-11-08 ENCOUNTER — ASSISTED LIVING VISIT (OUTPATIENT)
Dept: GERIATRICS | Facility: CLINIC | Age: 87
End: 2022-11-08
Payer: MEDICARE

## 2022-11-08 VITALS
DIASTOLIC BLOOD PRESSURE: 51 MMHG | HEART RATE: 78 BPM | SYSTOLIC BLOOD PRESSURE: 151 MMHG | RESPIRATION RATE: 16 BRPM | OXYGEN SATURATION: 92 %

## 2022-11-08 DIAGNOSIS — Z86.79 HISTORY OF INTRACRANIAL HEMORRHAGE: ICD-10-CM

## 2022-11-08 DIAGNOSIS — F03.B18 MODERATE DEMENTIA WITH BEHAVIORAL DISTURBANCE (H): Primary | ICD-10-CM

## 2022-11-08 DIAGNOSIS — R62.7 FAILURE TO THRIVE IN ADULT: ICD-10-CM

## 2022-11-08 DIAGNOSIS — I63.9 CEREBRAL INFARCTION, UNSPECIFIED MECHANISM (H): ICD-10-CM

## 2022-11-08 DIAGNOSIS — Z74.09 DOES NOT WALK: ICD-10-CM

## 2022-11-08 DIAGNOSIS — Z86.16 HISTORY OF COVID-19: ICD-10-CM

## 2022-11-08 DIAGNOSIS — C44.320 SQUAMOUS CELL CARCINOMA OF SKIN OF FACE: ICD-10-CM

## 2022-11-08 DIAGNOSIS — N40.0 BENIGN PROSTATIC HYPERPLASIA, UNSPECIFIED WHETHER LOWER URINARY TRACT SYMPTOMS PRESENT: ICD-10-CM

## 2022-11-08 DIAGNOSIS — R63.8 DECREASED ORAL INTAKE: ICD-10-CM

## 2022-11-08 PROBLEM — F03.A0 MILD DEMENTIA (H): Status: RESOLVED | Noted: 2017-10-09 | Resolved: 2022-11-08

## 2022-11-08 NOTE — PROGRESS NOTES
"Chief Complaint: asked to see pt has he has been sleeping more and eating less since having COVID last month. He conts to resist cares at times also per staff. Pt was sent to hospital on 11/2 after staff called, updated daughter of pt's decline. Sent for eval--took john po fluid/foods--no tests done and sent back to AL  HPI:    Rell Erazo is a 89 year old  (6/5/1928), who is being [facility] today [facility]. Today's concern is: SBP trending downward-- 86/54 earlier today.     Moderate dementia with behavioral disturbance  Cerebral infarction, unspecified mechanism (H)  Does not walk  History of intracranial hemorrhage  History of COVID-19  Decreased oral intake  Failure to thrive in adult  Squamous cell carcinoma of skin of face  Benign prostatic hyperplasia, unspecified whether lower urinary tract symptoms present      BP (!) 151/51   Pulse 78   Resp 16   SpO2 92%   TODAY'S EXAM:  SUBJECTIVE/ROS:  \"I want to go back to bed\"--repeating phrase. Allowed exam. Did not answer questions when asked    OBJECTIVE DATA:   GENERAL APPEARANCE:  Alert, in no distress. Lungs CTA , RRR No  edema. Abdomen is soft, +BTS . No `new focal neurological deficits.  Forehead lesion--non bleeding, red, moist.    Current Outpatient Medications   Medication Sig Dispense Refill     ACE/ARB/ARNI NOT PRESCRIBED (INTENTIONAL) Please choose reason not prescribed from choices below.       acetaminophen (TYLENOL) 500 MG tablet Take 1,000 mg by mouth 2 times daily OA       calcium carbonate (TUMS) 500 MG chewable tablet Take 1 chew tab by mouth 2 times daily (with meals)       finasteride (PROSCAR) 5 MG tablet Take 5 mg by mouth daily       LORazepam (ATIVAN) 1 MG tablet Take 0.5 tablets (0.5 mg) by mouth daily as needed for agitation or anxiety 10 tablet 1     tamsulosin (FLOMAX) 0.4 MG capsule Take 0.4 mg by mouth every evening       traZODone (DESYREL) 50 MG tablet Take 0.5 " tablets (25 mg) by mouth At Bedtime 30 tablet 1     Vitamin D, Cholecalciferol, 25 MCG (1000 UT) TABS Take 1,000 Units by mouth daily       ASSESSMENT / PLAN:  (Z86.16) History of COVID-19: October 2022  (R63.8) Decreased oral intake   (R62.7) Failure to thrive in adult  Comment/Plan: more of decline last week +--in ER and no new treatment/cares. Likely hospice candidate--see below d/w daughter    (F03.B18) Moderate dementia with behavioral disturbance  (primary encounter diagnosis)   (I63.9) Cerebral infarction, unspecified mechanism (H)   (Z86.79) History of intracranial hemorrhage   (Z74.09) Does not walk  Comment/Plan: conts to decline  Needs 24 hr cares,  Prn ativan to allow cares but still resistive at times.    (C44.320) Squamous cell carcinoma of skin of face: forehead  Comment/Plan: no further treatment due to above. Monitor      I called and d/w family daughter, Sleam an update, the POC and care coordination.  This included discussion of pertinent diagnostic results,decline in eating and sleeping more.  She said she and sister wish to have hospice consult and choose [facility]. All questions answered and there is agreement   on the Care Plan.     Electronically Signed by:  ADRYAN Castellanos CNP

## 2022-11-08 NOTE — LETTER
"    11/8/2022        RE: Rell Erazo  [facility]  [address]  [facility] [address]        Chief Complaint: asked to see pt has he has been sleeping more and eating less since having COVID last month. He conts to resist cares at times also per staff. Pt was sent to hospital on 11/2 after staff called, updated daughter of pt's decline. Sent for eval--took john po fluid/foods--no tests done and sent back to AL  HPI:    Rell Erazo is a 89 year old  (6/5/1928), who is being seen today [facility]. Today's concern is: SBP trending downward-- 86/54 earlier today.     Moderate dementia with behavioral disturbance  Cerebral infarction, unspecified mechanism (H)  Does not walk  History of intracranial hemorrhage  History of COVID-19  Decreased oral intake  Failure to thrive in adult  Squamous cell carcinoma of skin of face  Benign prostatic hyperplasia, unspecified whether lower urinary tract symptoms present      BP (!) 151/51   Pulse 78   Resp 16   SpO2 92%   TODAY'S EXAM:  SUBJECTIVE/ROS:  \"I want to go back to bed\"--repeating phrase. Allowed exam. Did not answer questions when asked    OBJECTIVE DATA:   GENERAL APPEARANCE:  Alert, in no distress. Lungs CTA , RRR No  edema. Abdomen is soft, +BTS . No `new focal neurological deficits.  Forehead lesion--non bleeding, red, moist.    Current Outpatient Medications   Medication Sig Dispense Refill     ACE/ARB/ARNI NOT PRESCRIBED (INTENTIONAL) Please choose reason not prescribed from choices below.       acetaminophen (TYLENOL) 500 MG tablet Take 1,000 mg by mouth 2 times daily OA       calcium carbonate (TUMS) 500 MG chewable tablet Take 1 chew tab by mouth 2 times daily (with meals)       finasteride (PROSCAR) 5 MG tablet Take 5 mg by mouth daily       LORazepam (ATIVAN) 1 MG tablet Take 0.5 tablets (0.5 mg) by mouth daily as needed for agitation or anxiety 10 tablet 1     " tamsulosin (FLOMAX) 0.4 MG capsule Take 0.4 mg by mouth every evening       traZODone (DESYREL) 50 MG tablet Take 0.5 tablets (25 mg) by mouth At Bedtime 30 tablet 1     Vitamin D, Cholecalciferol, 25 MCG (1000 UT) TABS Take 1,000 Units by mouth daily       ASSESSMENT / PLAN:  (Z86.16) History of COVID-19: October 2022  (R63.8) Decreased oral intake   (R62.7) Failure to thrive in adult  Comment/Plan: more of decline last week +--in ER and no new treatment/cares. Likely hospice candidate--see below d/w daughter    (F03.B18) Moderate dementia with behavioral disturbance  (primary encounter diagnosis)   (I63.9) Cerebral infarction, unspecified mechanism (H)   (Z86.79) History of intracranial hemorrhage   (Z74.09) Does not walk  Comment/Plan: conts to decline  Needs 24 hr cares,  Prn ativan to allow cares but still resistive at times.    (C44.320) Squamous cell carcinoma of skin of face: forehead  Comment/Plan: no further treatment due to above. Monitor      I called and d/w family daughter, Selam an update, the POC and care coordination.  This included discussion of pertinent diagnostic results,decline in eating and sleeping more.  She said she and sister wish to have hospice consult and choose [facility]. All questions answered and there is agreement   on the Care Plan.     Electronically Signed by:  ADRYAN Castellanos CNP        Sincerely,        ADRYAN Castellanos CNP

## 2022-11-14 ENCOUNTER — DOCUMENTATION ONLY (OUTPATIENT)
Dept: OTHER | Facility: CLINIC | Age: 87
End: 2022-11-14

## 2023-01-01 ENCOUNTER — ASSISTED LIVING VISIT (OUTPATIENT)
Dept: GERIATRICS | Facility: CLINIC | Age: 88
End: 2023-01-01
Payer: MEDICARE

## 2023-01-01 ENCOUNTER — DOCUMENTATION ONLY (OUTPATIENT)
Dept: GERIATRICS | Facility: CLINIC | Age: 88
End: 2023-01-01
Payer: MEDICARE

## 2023-01-01 ENCOUNTER — NURSING HOME VISIT (OUTPATIENT)
Dept: GERIATRICS | Facility: CLINIC | Age: 88
End: 2023-01-01
Payer: MEDICARE

## 2023-01-01 ENCOUNTER — TELEPHONE (OUTPATIENT)
Dept: GERIATRICS | Facility: CLINIC | Age: 88
End: 2023-01-01
Payer: MEDICARE

## 2023-01-01 VITALS
HEART RATE: 78 BPM | WEIGHT: 159 LBS | SYSTOLIC BLOOD PRESSURE: 151 MMHG | OXYGEN SATURATION: 92 % | TEMPERATURE: 98.4 F | DIASTOLIC BLOOD PRESSURE: 51 MMHG | BODY MASS INDEX: 24.1 KG/M2 | RESPIRATION RATE: 16 BRPM | HEIGHT: 68 IN

## 2023-01-01 VITALS
BODY MASS INDEX: 18.64 KG/M2 | OXYGEN SATURATION: 92 % | WEIGHT: 122.99 LBS | HEART RATE: 78 BPM | HEIGHT: 68 IN | RESPIRATION RATE: 16 BRPM | SYSTOLIC BLOOD PRESSURE: 112 MMHG | DIASTOLIC BLOOD PRESSURE: 64 MMHG

## 2023-01-01 VITALS
WEIGHT: 122 LBS | SYSTOLIC BLOOD PRESSURE: 112 MMHG | OXYGEN SATURATION: 92 % | HEIGHT: 68 IN | RESPIRATION RATE: 16 BRPM | BODY MASS INDEX: 18.49 KG/M2 | TEMPERATURE: 98.4 F | DIASTOLIC BLOOD PRESSURE: 64 MMHG | HEART RATE: 78 BPM

## 2023-01-01 VITALS
HEART RATE: 78 BPM | HEIGHT: 68 IN | BODY MASS INDEX: 24.1 KG/M2 | DIASTOLIC BLOOD PRESSURE: 51 MMHG | OXYGEN SATURATION: 92 % | RESPIRATION RATE: 16 BRPM | WEIGHT: 159 LBS | SYSTOLIC BLOOD PRESSURE: 151 MMHG | TEMPERATURE: 98.4 F

## 2023-01-01 VITALS
DIASTOLIC BLOOD PRESSURE: 66 MMHG | TEMPERATURE: 97 F | WEIGHT: 131 LBS | HEIGHT: 68 IN | OXYGEN SATURATION: 75 % | RESPIRATION RATE: 18 BRPM | SYSTOLIC BLOOD PRESSURE: 115 MMHG | BODY MASS INDEX: 19.85 KG/M2 | HEART RATE: 64 BPM

## 2023-01-01 VITALS
TEMPERATURE: 98.4 F | WEIGHT: 159 LBS | DIASTOLIC BLOOD PRESSURE: 51 MMHG | RESPIRATION RATE: 16 BRPM | HEART RATE: 78 BPM | SYSTOLIC BLOOD PRESSURE: 151 MMHG | OXYGEN SATURATION: 92 % | BODY MASS INDEX: 24.1 KG/M2 | HEIGHT: 68 IN

## 2023-01-01 VITALS
OXYGEN SATURATION: 92 % | HEIGHT: 68 IN | WEIGHT: 122 LBS | RESPIRATION RATE: 16 BRPM | SYSTOLIC BLOOD PRESSURE: 112 MMHG | TEMPERATURE: 98.4 F | HEART RATE: 78 BPM | BODY MASS INDEX: 18.49 KG/M2 | DIASTOLIC BLOOD PRESSURE: 64 MMHG

## 2023-01-01 DIAGNOSIS — Z74.09 DOES NOT WALK: ICD-10-CM

## 2023-01-01 DIAGNOSIS — I25.10 CORONARY ARTERY DISEASE INVOLVING NATIVE CORONARY ARTERY OF NATIVE HEART WITHOUT ANGINA PECTORIS: ICD-10-CM

## 2023-01-01 DIAGNOSIS — Z00.00 ROUTINE GENERAL MEDICAL EXAMINATION AT A HEALTH CARE FACILITY: ICD-10-CM

## 2023-01-01 DIAGNOSIS — C44.320 SQUAMOUS CELL CARCINOMA OF SKIN OF FACE: ICD-10-CM

## 2023-01-01 DIAGNOSIS — R35.0 BENIGN PROSTATIC HYPERPLASIA WITH URINARY FREQUENCY: ICD-10-CM

## 2023-01-01 DIAGNOSIS — R62.7 FAILURE TO THRIVE IN ADULT: ICD-10-CM

## 2023-01-01 DIAGNOSIS — C44.229 SQUAMOUS CELL CANCER OF EXTERNAL EAR, LEFT: ICD-10-CM

## 2023-01-01 DIAGNOSIS — F03.B18 MODERATE DEMENTIA WITH BEHAVIORAL DISTURBANCE (H): Primary | ICD-10-CM

## 2023-01-01 DIAGNOSIS — Z95.1 HX OF CABG: ICD-10-CM

## 2023-01-01 DIAGNOSIS — Z86.79 HISTORY OF ATRIAL FIBRILLATION: ICD-10-CM

## 2023-01-01 DIAGNOSIS — F41.9 ANXIETY: ICD-10-CM

## 2023-01-01 DIAGNOSIS — I63.9 CEREBRAL INFARCTION, UNSPECIFIED MECHANISM (H): ICD-10-CM

## 2023-01-01 DIAGNOSIS — Z51.5 HOSPICE CARE PATIENT: ICD-10-CM

## 2023-01-01 DIAGNOSIS — R63.8 DECREASED ORAL INTAKE: ICD-10-CM

## 2023-01-01 DIAGNOSIS — R63.4 WEIGHT LOSS: ICD-10-CM

## 2023-01-01 DIAGNOSIS — E78.2 MIXED HYPERLIPIDEMIA: ICD-10-CM

## 2023-01-01 DIAGNOSIS — N40.0 BENIGN PROSTATIC HYPERPLASIA, UNSPECIFIED WHETHER LOWER URINARY TRACT SYMPTOMS PRESENT: ICD-10-CM

## 2023-01-01 DIAGNOSIS — Z86.79 HISTORY OF INTRACRANIAL HEMORRHAGE: ICD-10-CM

## 2023-01-01 DIAGNOSIS — N40.1 BENIGN PROSTATIC HYPERPLASIA WITH URINARY FREQUENCY: ICD-10-CM

## 2023-01-01 DIAGNOSIS — M15.0 PRIMARY OSTEOARTHRITIS INVOLVING MULTIPLE JOINTS: ICD-10-CM

## 2023-01-01 DIAGNOSIS — C44.310 BASAL CELL CARCINOMA OF FACE: ICD-10-CM

## 2023-01-01 PROCEDURE — 99348 HOME/RES VST EST LOW MDM 30: CPT | Mod: GV | Performed by: NURSE PRACTITIONER

## 2023-01-01 PROCEDURE — 99349 HOME/RES VST EST MOD MDM 40: CPT | Mod: GV | Performed by: NURSE PRACTITIONER

## 2023-01-01 PROCEDURE — 99309 SBSQ NF CARE MODERATE MDM 30: CPT | Mod: GV | Performed by: NURSE PRACTITIONER

## 2023-01-01 RX ORDER — NYSTATIN 100000 U/G
1 CREAM TOPICAL 2 TIMES DAILY
COMMUNITY
End: 2023-01-01

## 2023-01-01 RX ORDER — MORPHINE SULFATE 30 MG/1
2.5 TABLET ORAL
COMMUNITY

## 2023-01-01 RX ORDER — LORAZEPAM 0.5 MG/1
TABLET ORAL
Qty: 30 TABLET | Refills: 4 | Status: SHIPPED | OUTPATIENT
Start: 2023-01-01

## 2023-01-01 RX ORDER — HYOSCYAMINE SULFATE 0.125 MG
0.12 TABLET,DISINTEGRATING ORAL EVERY 6 HOURS PRN
COMMUNITY
End: 2023-01-01

## 2023-01-19 ENCOUNTER — ASSISTED LIVING VISIT (OUTPATIENT)
Dept: GERIATRICS | Facility: CLINIC | Age: 88
End: 2023-01-19
Payer: MEDICARE

## 2023-01-19 VITALS
DIASTOLIC BLOOD PRESSURE: 51 MMHG | SYSTOLIC BLOOD PRESSURE: 151 MMHG | OXYGEN SATURATION: 92 % | HEART RATE: 78 BPM | BODY MASS INDEX: 24.1 KG/M2 | WEIGHT: 159 LBS | RESPIRATION RATE: 16 BRPM | HEIGHT: 68 IN

## 2023-01-19 DIAGNOSIS — N18.31 STAGE 3A CHRONIC KIDNEY DISEASE (H): ICD-10-CM

## 2023-01-19 DIAGNOSIS — M15.0 PRIMARY OSTEOARTHRITIS INVOLVING MULTIPLE JOINTS: ICD-10-CM

## 2023-01-19 DIAGNOSIS — N40.0 BENIGN PROSTATIC HYPERPLASIA, UNSPECIFIED WHETHER LOWER URINARY TRACT SYMPTOMS PRESENT: Primary | ICD-10-CM

## 2023-01-19 DIAGNOSIS — I63.9 CEREBRAL INFARCTION, UNSPECIFIED MECHANISM (H): ICD-10-CM

## 2023-01-19 DIAGNOSIS — F03.B0 MODERATE DEMENTIA WITHOUT BEHAVIORAL DISTURBANCE (H): ICD-10-CM

## 2023-01-19 DIAGNOSIS — M62.81 GENERALIZED MUSCLE WEAKNESS: ICD-10-CM

## 2023-01-19 PROCEDURE — 99349 HOME/RES VST EST MOD MDM 40: CPT | Mod: GW | Performed by: INTERNAL MEDICINE

## 2023-01-19 NOTE — PROGRESS NOTES
Rell Erazo is a 89 year old male seen January 19, 2023 at [facility] Memory Care unit where he has resided for almost 2 years (admit 4/2021) seen to follow up advancing dementia    Pt is seen in his room lying abed   Few short answers to questions, says no to pain or any other symptoms   States he is comfortable.        By chart review, pt had been living in the community with his daughters providing care, but memory concerns worsening and he needed more support, so has moved to [place].   [facility]   He has CAD with h/o CABG in 2012, and cerebral vascular ds with h/o stroke in 2014.  He had a fall with ICH in 2017 while on warfarin +ASA.   He had worsening of his baseline dementia since that time.  At some point he stopped all medications and admitted to AL without any scheduled meds.  Earlier this year he was started on vit D for level of 19.    In September 2022 pt had SCC removed from forehead and BCC from left chest wall.   Pt had COVID19 infection in October 2022 and was seen in ED on 11/2 by family request due to decreased oral intake and activity in setting of the quarantine.   After discussion, no workup undertaken and he returned to AL.   Consult with [facility] undertaken with daughters.       Past Medical History:   Diagnosis Date     ACP (advance care planning) 04/26/2012     Basal cell carcinoma of face 9/16/2010     BPH (benign prostatic hyperplasia)      CAD (coronary artery disease)     S/P 3-vessel CABG 2012 4/26/2012 s/p CABG 4/24/2012; LIMA to LAd, SV to OM, SV to RCA     Carotid disease, bilateral (H)     Less than 50% stenosis     Chronic atrial fibrillation (H)      CVA (cerebral infarction) 01/01/2013    Small, bifrontal CVA     Diverticulosis of large intestine      Hyperlipidemia LDL goal < 100 04/26/2012    Mixed hyperlipidemia     Kidney stone 1981     Personal history of tobacco use, presenting hazards to health     Quit in  "1981     Restless legs syndrome (RLS)      TIA (transient ischemic attack) 05/2013    rt arm weakness       Past Surgical History:   Procedure Laterality Date     BYPASS GRAFT ARTERY CORONARY  04/24/2012    X3 LAD,RCA, obtuse marginal     CARDIAC SURGERY  04/01/2012    x3 CABG     SH:   2018, has 2 daughters.    Previously cared for at home by his daughters.    Attended [place] [facility] [facility].   Past smoker      ROS:  SLUMS 20/30   Safety questionnaire 7.5/27 in 2017    Wt Readings from Last 5 Encounters:   01/19/23 72.1 kg (159 lb)   10/03/22 72.1 kg (159 lb)   02/08/22 72.1 kg (159 lb)   02/08/22 72.1 kg (159 lb)   02/08/22 72.1 kg (159 lb)      EXAM: very frail, NAD  BP (!) 151/51   Pulse 78   Resp 16   Ht 1.727 m (5' 8\")   Wt 72.1 kg (159 lb)   SpO2 92%   BMI 24.18 kg/m     Neck supple without adenopathy  Lungs clear bilaterally  Heart RRR s1s2, +ectopy  Abd soft, NT, no distention or guarding, +BS  Ext without edema, +sarcopenia  Neuro: limited history, generalized weakness  Psych: affect flat     Labs reviewed, no recent checks given goals of care          IMP/PLAN:   (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  (M62.81) Generalized muscle weakness  Comment: impaired gait, with flexion at knees and hips   Now WC bound with assist for transfers   Plan: scheduled acetaminophen bid and PRN    (N40.0) Benign prostatic hyperplasia, unspecified whether lower urinary tract symptoms present   (N18.31) Stage 3a chronic kidney disease    Comment: GFR 44 at last check  Plan: Renal dosing of medications   Tamsulosin 0.4 mg/day and finasteride 5 mg/day to avoid urinary retention     (I25.10) Coronary artery disease involving native coronary artery of native heart without angina pectoris  Comment: s/p CABG  Plan: no current medications, monitor for symptoms     (I63.89) Cerebral infarction due to other mechanism (H)  (F03.90) Moderate dementia without behavioral disturbance (H)  Comment: may " be mixed Alzheimer's /vascular dementia worsening after ICH  Plan: [place] Memory Care unit support for med admin, meals, activity, secure unit     Comfort focused care    (E55.9) Vitamin D deficiency  Comment: Remains ambulatory   Plan: vit D replacement, dietary calcium     Emerald Talley MD

## 2023-01-19 NOTE — LETTER
1/19/2023        RE: Rell Erazo  [facility]  [address]  [facility] [address]        Rell Erazo is a 89 year old male seen January 19, 2023 at [facility] Memory Care unit where he has resided for almost 2 years (admit 4/2021) seen to follow up advancing dementia    Pt is seen in his room lying abed   Few short answers to questions, says no to pain or any other symptoms   States he is comfortable.        By chart review, pt had been living in the community with his daughters providing care, but memory concerns worsening and he needed more support, so has moved to [place].   [facility]   He has CAD with h/o CABG in 2012, and cerebral vascular ds with h/o stroke in 2014.  He had a fall with ICH in 2017 while on warfarin +ASA.   He had worsening of his baseline dementia since that time.  At some point he stopped all medications and admitted to AL without any scheduled meds.  Earlier this year he was started on vit D for level of 19.    In September 2022 pt had SCC removed from forehead and BCC from left chest wall.   Pt had COVID19 infection in October 2022 and was seen in ED on 11/2 by family request due to decreased oral intake and activity in setting of the quarantine.   After discussion, no workup undertaken and he returned to AL.   Consult with [facility] undertaken with daughters.       Past Medical History:   Diagnosis Date     ACP (advance care planning) 04/26/2012     Basal cell carcinoma of face 9/16/2010     BPH (benign prostatic hyperplasia)      CAD (coronary artery disease)     S/P 3-vessel CABG 2012 4/26/2012 s/p CABG 4/24/2012; LIMA to LAd, SV to OM, SV to RCA     Carotid disease, bilateral (H)     Less than 50% stenosis     Chronic atrial fibrillation (H)      CVA (cerebral infarction) 01/01/2013    Small, bifrontal CVA     Diverticulosis of large intestine      Hyperlipidemia LDL goal < 100 04/26/2012  "   Mixed hyperlipidemia     Kidney stone 1981     Personal history of tobacco use, presenting hazards to health     Quit in 1981     Restless legs syndrome (RLS)      TIA (transient ischemic attack) 05/2013    rt arm weakness       Past Surgical History:   Procedure Laterality Date     BYPASS GRAFT ARTERY CORONARY  04/24/2012    X3 LAD,RCA, obtuse marginal     CARDIAC SURGERY  04/01/2012    x3 CABG     SH:   2018, has 2 daughters.    Previously cared for at home by his daughters.    Attended [facility] [facility].   Past smoker      ROS:  SLUMS 20/30   Safety questionnaire 7.5/27 in 2017    Wt Readings from Last 5 Encounters:   01/19/23 72.1 kg (159 lb)   10/03/22 72.1 kg (159 lb)   02/08/22 72.1 kg (159 lb)   02/08/22 72.1 kg (159 lb)   02/08/22 72.1 kg (159 lb)      EXAM: very frail, NAD  BP (!) 151/51   Pulse 78   Resp 16   Ht 1.727 m (5' 8\")   Wt 72.1 kg (159 lb)   SpO2 92%   BMI 24.18 kg/m     Neck supple without adenopathy  Lungs clear bilaterally  Heart RRR s1s2, +ectopy  Abd soft, NT, no distention or guarding, +BS  Ext without edema, +sarcopenia  Neuro: limited history, generalized weakness  Psych: affect flat     Labs reviewed, no recent checks given goals of care          IMP/PLAN:   (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  (M62.81) Generalized muscle weakness  Comment: impaired gait, with flexion at knees and hips   Now WC bound with assist for transfers   Plan: scheduled acetaminophen bid and PRN    (N40.0) Benign prostatic hyperplasia, unspecified whether lower urinary tract symptoms present   (N18.31) Stage 3a chronic kidney disease    Comment: GFR 44 at last check  Plan: Renal dosing of medications   Tamsulosin 0.4 mg/day and finasteride 5 mg/day to avoid urinary retention     (I25.10) Coronary artery disease involving native coronary artery of native heart without angina pectoris  Comment: s/p CABG  Plan: no current medications, monitor for symptoms     (I63.89) " Cerebral infarction due to other mechanism (H)  (F03.90) Moderate dementia without behavioral disturbance (H)  Comment: may be mixed Alzheimer's /vascular dementia worsening after ICH  Plan: [place] Memory Care unit support for med admin, meals, activity, secure unit     Comfort focused care    (E55.9) Vitamin D deficiency  Comment: Remains ambulatory   Plan: vit D replacement, dietary calcium     Emerald Talley MD           Sincerely,        Emerald Talley MD

## 2023-03-13 NOTE — LETTER
3/13/2023        RE: Rell Erazo  [facility]  [address]  [facility] [address]        [place] [facility]  Chief Complaint   Patient presents with     Annual Comprehensive Nursing Home     [place] Medical Record Number:  [id number]  Place of Service where encounter took place:  [facility] [employer] ([facility]) [[id number]]    HPI:    Rell Erazo  is a 89 year old  (6/5/1928), who is being seen today for an annual comprehensive visit. HPI information obtained from: facility chart records, facility staff and [facility] chart review.  Today's concerns are:     Moderate dementia with behavioral disturbance  Cerebral infarction, unspecified mechanism (H)  Hospice care patient  Primary osteoarthritis involving multiple joints  Squamous cell carcinoma of skin of face  Squamous cell cancer of external ear, left  Benign prostatic hyperplasia with urinary frequency  Coronary artery disease involving native coronary artery of native heart without angina pectoris  Hx of CABG  History of atrial fibrillation         ALLERGIES: Cat hair extract  PAST MEDICAL HISTORY:  has a past medical history of ACP (advance care planning) (04/26/2012), Basal cell carcinoma of face (9/16/2010), BPH (benign prostatic hyperplasia), CAD (coronary artery disease), Carotid disease, bilateral (H), Chronic atrial fibrillation (H), CVA (cerebral infarction) (01/01/2013), Diverticulosis of large intestine, Hyperlipidemia LDL goal < 100 (04/26/2012), Kidney stone (1981), Personal history of tobacco use, presenting hazards to health, Restless legs syndrome (RLS), and TIA (transient ischemic attack) (05/2013).    He has no past medical history of Malignant melanoma (H) or Squamous cell carcinoma of skin, unspecified.  PAST SURGICAL HISTORY:  has a past surgical history that includes Cardiac surgery (04/01/2012) and Bypass graft artery coronary (04/24/2012).  IMMUNIZATIONS:  Immunization  History   Administered Date(s) Administered     COVID-19 Vaccine 12+ (Pfizer) 01/19/2021, 02/09/2021     COVID-19 Vaccine 18+ (Moderna) 11/09/2021, 05/10/2022     FLUAD(HD)65+ QUAD 09/15/2021, 09/08/2022     Flu 65+ Years 09/17/2018     Flu, Unspecified 10/22/1996, 10/20/1997, 10/20/1998, 10/14/1999, 11/09/2000, 10/23/2001, 10/28/2002, 10/23/2003, 10/18/2004, 10/19/2005, 10/19/2006, 10/24/2007, 10/28/2008, 09/29/2009, 10/20/2010     Influenza (H1N1) 01/11/2010     Influenza (High Dose) 3 valent vaccine 03/10/2016, 10/28/2016, 10/05/2017, 09/15/2019     Influenza (IIV3) PF 11/01/2012, 12/31/2012, 09/25/2014     Influenza Vaccine >6 months (Alfuria,Fluzone) 09/18/2020     Pneumococcal 23 valent 06/24/1993     Pneumococcal, Unspecified 09/01/2004     Zoster vaccine recombinant adjuvanted (SHINGRIX) 09/18/2020, 12/15/2020     Above immunizations pulled from [facility]. [facility] and facility records also reconciled. Outstanding information sent to  to update [facility]  .  Future immunizations are deferred as: not clinically appropriate given goals of care     Current Outpatient Medications   Medication Sig Dispense Refill     hyoscyamine 0.125 MG TBDP Take 0.125 mg by mouth every 6 hours as needed       morphine 2.5 MG solu-tab Take 2.5 mg by mouth every hour as needed for shortness of breath or moderate to severe pain       nystatin (MYCOSTATIN) 059745 UNIT/GM external cream Apply 1 applicator topically 2 times daily Bid and prn to rash red areas       ACE/ARB/ARNI NOT PRESCRIBED (INTENTIONAL) Please choose reason not prescribed from choices below.       acetaminophen (TYLENOL) 500 MG tablet Take 1,000 mg by mouth 2 times daily OA       finasteride (PROSCAR) 5 MG tablet Take 5 mg by mouth daily       LORazepam (ATIVAN) 1 MG tablet Take 0.5 tablets (0.5 mg) by mouth daily as needed for agitation or anxiety 10 tablet 1     tamsulosin (FLOMAX) 0.4 MG capsule Take 0.4 mg by mouth every evening            "  Case Management:  I have reviewed the facility/SNF care plan/MDS, including the falls risk, nutrition and pain screening. I also reviewed the current immunizations, and preventive care. .Future cancer screening is not clinically indicated secondary to age/goals of care Patient's desire to return to the community is not assessible due to cognitive impairment. Current Level of Care is appropriate.    Advance Directive Discussion:    I reviewed the current advanced directives as reflected in EPIC, the POLST and the facility chart, and verified the congruency of orders  .    Team Discussion:  I communicated with the appropriate disciplines involved with the Plan of Care:   Nursing    Patient's goal is unobtainable secondary to cognitive impairment.  Information reviewed:  Medications, vital signs, orders, and nursing notes.    ROS:  Unobtainable secondary to cognitive impairment.     Vitals:  BP (!) 151/51   Pulse 78   Temp 98.4  F (36.9  C)   Resp 16   Ht 1.727 m (5' 8\")   Wt 72.1 kg (159 lb)   SpO2 92%   BMI 24.18 kg/m   Body mass index is 24.18 kg/m .  Exam:  GENERAL APPEARANCE:  thin, somnolent  ENT:  oral mucous membranes moist  EYES:  EOM, conjunctivae, lids, pupils and irises normal, Conjunctiva and lids normal  RESP:  respiratory effort and palpation of chest normal, lungs clear to auscultation but  diminished breath sounds  non labored  CV:  Palpation and auscultation of heart done , regular rate and rhythm, no murmur, rub, or gallop, no edema  ABDOMEN:  decreased BTs,  soft, nontender, flat abd.  M/S:   FREIRE randomly, lying in bed  SKIN:  Inspection of skin and subcutaneous tissue baseline, forehead cancer lesion growing and moist,no drainage ~~2cm lindsay. Lesions behind left ear present--~~very small, open.  NEURO:   random movements      Lab/Diagnostic data:   No new labs since on hospice    ASSESSMENT / PLAN:  (F03.B18) Moderate dementia with behavioral disturbance  (primary encounter diagnosis)   " (I63.9) Cerebral infarction, unspecified mechanism (H)   (Z51.5) Hospice care patient  Comment: appears comfortable,   Plan: cont same hospice meds, discontinue vit d and Tums--not `needed at this time    (M15.9) Primary osteoarthritis involving multiple joints  Comment/Plan: comfortable per staff--cont tylenol prn MSO4    (C44.320) Squamous cell carcinoma of skin of face: forehead   (C44.229) Squamous cell cancer of external ear, left  Comment/Plan: no further treatment due to decline, hospice goal is comfort. monitor.    (N40.1,  R35.0) Benign prostatic hyperplasia with urinary frequency  Comment/Plan:  Cont Proscar and Flomax for now--monitor.    (I25.10) Coronary artery disease involving native coronary artery of native heart without angina pectoris   (Z95.1) Hx of CABG   (Z86.79) History of atrial fibrillation  Comment: on no meds,   Plan: goal is comfort      Electronically signed by:  ADRYAN Castellanos CNP               Sincerely,        ADRYAN Castellanos CNP

## 2023-03-13 NOTE — PROGRESS NOTES
[place] [facility]  Chief Complaint   Patient presents with     Annual Comprehensive Nursing Home     [place] Medical Record Number:  [id number]  Place of Service where encounter took place:  [facility] ([facility]) [[id number]]    HPI:    Rell Erazo  is a 89 year old  (6/5/1928), who is being seen today for an annual comprehensive visit. HPI information obtained from: facility chart records, facility staff and [facility] chart review.  Today's concerns are:     Moderate dementia with behavioral disturbance  Cerebral infarction, unspecified mechanism (H)  Hospice care patient  Primary osteoarthritis involving multiple joints  Squamous cell carcinoma of skin of face  Squamous cell cancer of external ear, left  Benign prostatic hyperplasia with urinary frequency  Coronary artery disease involving native coronary artery of native heart without angina pectoris  Hx of CABG  History of atrial fibrillation         ALLERGIES: Cat hair extract  PAST MEDICAL HISTORY:  has a past medical history of ACP (advance care planning) (04/26/2012), Basal cell carcinoma of face (9/16/2010), BPH (benign prostatic hyperplasia), CAD (coronary artery disease), Carotid disease, bilateral (H), Chronic atrial fibrillation (H), CVA (cerebral infarction) (01/01/2013), Diverticulosis of large intestine, Hyperlipidemia LDL goal < 100 (04/26/2012), Kidney stone (1981), Personal history of tobacco use, presenting hazards to health, Restless legs syndrome (RLS), and TIA (transient ischemic attack) (05/2013).    He has no past medical history of Malignant melanoma (H) or Squamous cell carcinoma of skin, unspecified.  PAST SURGICAL HISTORY:  has a past surgical history that includes Cardiac surgery (04/01/2012) and Bypass graft artery coronary (04/24/2012).  IMMUNIZATIONS:  Immunization History   Administered Date(s) Administered     COVID-19 Vaccine 12+ (Pfizer) 01/19/2021, 02/09/2021     COVID-19 Vaccine 18+  (Moderna) 11/09/2021, 05/10/2022     FLUAD(HD)65+ QUAD 09/15/2021, 09/08/2022     Flu 65+ Years 09/17/2018     Flu, Unspecified 10/22/1996, 10/20/1997, 10/20/1998, 10/14/1999, 11/09/2000, 10/23/2001, 10/28/2002, 10/23/2003, 10/18/2004, 10/19/2005, 10/19/2006, 10/24/2007, 10/28/2008, 09/29/2009, 10/20/2010     Influenza (H1N1) 01/11/2010     Influenza (High Dose) 3 valent vaccine 03/10/2016, 10/28/2016, 10/05/2017, 09/15/2019     Influenza (IIV3) PF 11/01/2012, 12/31/2012, 09/25/2014     Influenza Vaccine >6 months (Alfuria,Fluzone) 09/18/2020     Pneumococcal 23 valent 06/24/1993     Pneumococcal, Unspecified 09/01/2004     Zoster vaccine recombinant adjuvanted (SHINGRIX) 09/18/2020, 12/15/2020     Above immunizations pulled from [facility]. [facility] and facility records also reconciled. Outstanding information sent to  to update [facility]  .  Future immunizations are deferred as: not clinically appropriate given goals of care     Current Outpatient Medications   Medication Sig Dispense Refill     hyoscyamine 0.125 MG TBDP Take 0.125 mg by mouth every 6 hours as needed       morphine 2.5 MG solu-tab Take 2.5 mg by mouth every hour as needed for shortness of breath or moderate to severe pain       nystatin (MYCOSTATIN) 870623 UNIT/GM external cream Apply 1 applicator topically 2 times daily Bid and prn to rash red areas       ACE/ARB/ARNI NOT PRESCRIBED (INTENTIONAL) Please choose reason not prescribed from choices below.       acetaminophen (TYLENOL) 500 MG tablet Take 1,000 mg by mouth 2 times daily OA       finasteride (PROSCAR) 5 MG tablet Take 5 mg by mouth daily       LORazepam (ATIVAN) 1 MG tablet Take 0.5 tablets (0.5 mg) by mouth daily as needed for agitation or anxiety 10 tablet 1     tamsulosin (FLOMAX) 0.4 MG capsule Take 0.4 mg by mouth every evening             Case Management:  I have reviewed the facility/SNF care plan/MDS, including the falls risk, nutrition and pain screening. I  "also reviewed the current immunizations, and preventive care. .Future cancer screening is not clinically indicated secondary to age/goals of care Patient's desire to return to the community is not assessible due to cognitive impairment. Current Level of Care is appropriate.    Advance Directive Discussion:    I reviewed the current advanced directives as reflected in EPIC, the POLST and the facility chart, and verified the congruency of orders  .    Team Discussion:  I communicated with the appropriate disciplines involved with the Plan of Care:   Nursing    Patient's goal is unobtainable secondary to cognitive impairment.  Information reviewed:  Medications, vital signs, orders, and nursing notes.    ROS:  Unobtainable secondary to cognitive impairment.     Vitals:  BP (!) 151/51   Pulse 78   Temp 98.4  F (36.9  C)   Resp 16   Ht 1.727 m (5' 8\")   Wt 72.1 kg (159 lb)   SpO2 92%   BMI 24.18 kg/m   Body mass index is 24.18 kg/m .  Exam:  GENERAL APPEARANCE:  thin, somnolent  ENT:  oral mucous membranes moist  EYES:  EOM, conjunctivae, lids, pupils and irises normal, Conjunctiva and lids normal  RESP:  respiratory effort and palpation of chest normal, lungs clear to auscultation but  diminished breath sounds  non labored  CV:  Palpation and auscultation of heart done , regular rate and rhythm, no murmur, rub, or gallop, no edema  ABDOMEN:  decreased BTs,  soft, nontender, flat abd.  M/S:   FREIRE randomly, lying in bed  SKIN:  Inspection of skin and subcutaneous tissue baseline, forehead cancer lesion growing and moist,no drainage ~~2cm lindsay. Lesions behind left ear present--~~very small, open.  NEURO:   random movements      Lab/Diagnostic data:   No new labs since on hospice    ASSESSMENT / PLAN:  (F03.B18) Moderate dementia with behavioral disturbance  (primary encounter diagnosis)   (I63.9) Cerebral infarction, unspecified mechanism (H)   (Z51.5) Hospice care patient  Comment: appears comfortable,   Plan: cont " same hospice meds, discontinue vit d and Tums--not `needed at this time    (M15.9) Primary osteoarthritis involving multiple joints  Comment/Plan: comfortable per staff--cont tylenol prn MSO4    (C44.320) Squamous cell carcinoma of skin of face: forehead   (C44.229) Squamous cell cancer of external ear, left  Comment/Plan: no further treatment due to decline, hospice goal is comfort. monitor.    (N40.1,  R35.0) Benign prostatic hyperplasia with urinary frequency  Comment/Plan:  Cont Proscar and Flomax for now--monitor.    (I25.10) Coronary artery disease involving native coronary artery of native heart without angina pectoris   (Z95.1) Hx of CABG   (Z86.79) History of atrial fibrillation  Comment: on no meds,   Plan: goal is comfort      Electronically signed by:  ADRYAN Castellanos CNP

## 2023-03-15 PROBLEM — Z51.5 HOSPICE CARE PATIENT: Status: ACTIVE | Noted: 2023-01-01

## 2023-05-17 NOTE — PROGRESS NOTES
"Chief Complaint: no issues per staff--spneds more time in bed--appears comfortable, mostly non verbal.    HPI:    Rell Erazo is a 89 year old  (6/5/1928), who is being seen today for an episodic care visit at [facility] ([facility] Today's concern is:     Moderate dementia with behavioral disturbance (H)  Cerebral infarction, unspecified mechanism (H)  Does not walk  Decreased oral intake  Failure to thrive in adult  Hospice care patient  Squamous cell carcinoma of skin of face  Benign prostatic hyperplasia with urinary frequency  Coronary artery disease involving native coronary artery of native heart without angina pectoris  History of atrial fibrillation      BP (!) 151/51   Pulse 78   Temp 98.4  F (36.9  C)   Resp 16   Ht 1.727 m (5' 8\")   Wt 72.1 kg (159 lb)   SpO2 92%   BMI 24.18 kg/m    TODAY'S EXAM:  SUBJECTIVE: non verbal--has direct eye contact during exam. Allowed exam--seen in bed     OBJECTIVE DATA:   GENERAL APPEARANCE:  Alert,  in no distress. Lungs CTA ,RRR. No  edema. Abdomen is soft, +BTS-hypoactive. No new focal neurological deficits. Lesion behind left ear moist-- forehead lesion with serous drainage--appears red, but no sign infection--is larger than last visit.    Current Outpatient Medications   Medication Sig Dispense Refill     ACE/ARB/ARNI NOT PRESCRIBED (INTENTIONAL) Please choose reason not prescribed from choices below.       acetaminophen (TYLENOL) 650 MG suppository UNWRAP AND INSERT 1 SUPPOSITORY RECTALLY EVERY 6 HOURS AS NEEDED (DX:PAIN/FEVER) (MAX 4GM TYLENOL OR ACETAMINOPHEN/24 HRS) 12 suppository 10     ACETAMINOPHEN EXTRA STRENGTH 500 MG tablet 2 TABLETS (1000MG ) ORALLY 2 TIMES DAILY (MAX 4GM TYLENOL OR ACETAMINOPHEN/24 HRS) (DX: PAIN);2 TABLETS (1000MG ) ORALLY DAILY AS NEEDED (DX: PAIN) 180 tablet 10     ANTACID REGULAR STRENGTH 500 MG chewable tablet CHEW 1 TABLET ORALLY 2 TIMES DAILY (DX: SUPPLEMENT) 60 tablet 10     bisacodyl " (DULCOLAX) 10 MG suppository UNWRAP AND INSERT 1 SUPPOSITORY RECTALLY DAILY AS NEEDED  (DX: CONSTIPATION) 30 suppository 10     finasteride (PROSCAR) 5 MG tablet 1 TABLET ORALLY DAILY  (DX: URINARY RETENTION ) 30 tablet 10     hyoscyamine 0.125 MG TBDP 1 TABLET SUBLINGUALLY EVERY 6 HOURS AS NEEDED   (DX: SECRETIONS ) 30 tablet 10     LORazepam (ATIVAN) 0.5 MG tablet 1 TABLET ORALLY EVERY 6 HOURS AS NEEDED  (DX: ANXIETY) / (DX: AGITATION) 30 tablet 4     LORazepam (ATIVAN) 1 MG tablet Take 0.5 tablets (0.5 mg) by mouth daily as needed for agitation or anxiety 10 tablet 1     morphine 2.5 MG solu-tab Take 2.5 mg by mouth every hour as needed for shortness of breath or moderate to severe pain       nystatin (MYCOSTATIN) 090328 UNIT/GM external cream APPLY TOPICALLY TO REDNESS IN BILATERAL GROIN 2 TIMES DAILY;APPLY TOPICALLY TO REDNESS 2 TIMES DAILY AS NEEDED 30 g 10     tamsulosin (FLOMAX) 0.4 MG capsule 1 CAPSULE ORALLY DAILY (DX: BENIGN PROSTATIC HYPERPLASIA) 30 capsule 10     VITAMIN D3 25 MCG (1000 UT) tablet 1 TABLET ORALLY DAILY (DX: VITAMIN DEFICIENCY) 30 tablet 10     ASSESSMENT / PLAN:  (F03.B18) Moderate dementia with behavioral disturbance (H)  (primary encounter diagnosis)   (I63.9) Cerebral infarction, unspecified mechanism (H)   (Z74.09) Does not walk   (R63.8) Decreased oral intake   (R62.7) Failure to thrive in adult   (Z51.5) Hospice care patient  Comment/Plan: goal is comfort, conts to eat less, weight now is 123# on 5/9/23--had been 159# last fall 2022     (C44.320) 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     (N40.1,  R35.0) Benign prostatic hyperplasia with urinary frequency  Comment/Plan: conts with Flomax--incontinent, stop Flomax if not able to take pills    (I25.10) Coronary artery disease involving native coronary artery of native heart without angina pectoris  (Z86.79) History of atrial  fibrillation  Comment/Plan: goal is comfort, appreciate Hospice care, on no meds         Electronically Signed by:  ADRYAN Castellanos CNP

## 2023-05-18 NOTE — LETTER
"    5/18/2023        RE: Rell Erazo  [facility]  [address] [facility] [address]        Chief Complaint: no issues per staff--spneds more time in bed--appears comfortable, mostly non verbal.    HPI:    Rell Erazo is a 89 year old  (6/5/1928), who [email or web address] Today's concern is:     Moderate dementia with behavioral disturbance (H)  Cerebral infarction, unspecified mechanism (H)  Does not walk  Decreased oral intake  Failure to thrive in adult  Hospice care patient  Squamous cell carcinoma of skin of face  Benign prostatic hyperplasia with urinary frequency  Coronary artery disease involving native coronary artery of native heart without angina pectoris  History of atrial fibrillation      BP (!) 151/51   Pulse 78   Temp 98.4  F (36.9  C)   Resp 16   Ht 1.727 m (5' 8\")   Wt 72.1 kg (159 lb)   SpO2 92%   BMI 24.18 kg/m    TODAY'S EXAM:  SUBJECTIVE: non verbal--has direct eye contact during exam. Allowed exam--seen in bed     OBJECTIVE DATA:   GENERAL APPEARANCE:  Alert,  in no distress. Lungs CTA ,RRR. No  edema. Abdomen is soft, +BTS-hypoactive. No new focal neurological deficits. Lesion behind left ear moist-- forehead lesion with serous drainage--appears red, but no sign infection--is larger than last visit.    Current Outpatient Medications   Medication Sig Dispense Refill     ACE/ARB/ARNI NOT PRESCRIBED (INTENTIONAL) Please choose reason not prescribed from choices below.       acetaminophen (TYLENOL) 650 MG suppository UNWRAP AND INSERT 1 SUPPOSITORY RECTALLY EVERY 6 HOURS AS NEEDED (DX:PAIN/FEVER) (MAX 4GM TYLENOL OR ACETAMINOPHEN/24 HRS) 12 suppository 10     ACETAMINOPHEN EXTRA STRENGTH 500 MG tablet 2 TABLETS (1000MG ) ORALLY 2 TIMES DAILY (MAX 4GM TYLENOL OR ACETAMINOPHEN/24 HRS) (DX: PAIN);2 TABLETS (1000MG ) ORALLY DAILY AS NEEDED (DX: PAIN) 180 tablet 10     ANTACID " REGULAR STRENGTH 500 MG chewable tablet CHEW 1 TABLET ORALLY 2 TIMES DAILY (DX: SUPPLEMENT) 60 tablet 10     bisacodyl (DULCOLAX) 10 MG suppository UNWRAP AND INSERT 1 SUPPOSITORY RECTALLY DAILY AS NEEDED  (DX: CONSTIPATION) 30 suppository 10     finasteride (PROSCAR) 5 MG tablet 1 TABLET ORALLY DAILY  (DX: URINARY RETENTION ) 30 tablet 10     hyoscyamine 0.125 MG TBDP 1 TABLET SUBLINGUALLY EVERY 6 HOURS AS NEEDED   (DX: SECRETIONS ) 30 tablet 10     LORazepam (ATIVAN) 0.5 MG tablet 1 TABLET ORALLY EVERY 6 HOURS AS NEEDED  (DX: ANXIETY) / (DX: AGITATION) 30 tablet 4     LORazepam (ATIVAN) 1 MG tablet Take 0.5 tablets (0.5 mg) by mouth daily as needed for agitation or anxiety 10 tablet 1     morphine 2.5 MG solu-tab Take 2.5 mg by mouth every hour as needed for shortness of breath or moderate to severe pain       nystatin (MYCOSTATIN) 259736 UNIT/GM external cream APPLY TOPICALLY TO REDNESS IN BILATERAL GROIN 2 TIMES DAILY;APPLY TOPICALLY TO REDNESS 2 TIMES DAILY AS NEEDED 30 g 10     tamsulosin (FLOMAX) 0.4 MG capsule 1 CAPSULE ORALLY DAILY (DX: BENIGN PROSTATIC HYPERPLASIA) 30 capsule 10     VITAMIN D3 25 MCG (1000 UT) tablet 1 TABLET ORALLY DAILY (DX: VITAMIN DEFICIENCY) 30 tablet 10     ASSESSMENT / PLAN:  (F03.B18) Moderate dementia with behavioral disturbance (H)  (primary encounter diagnosis)   (I63.9) Cerebral infarction, unspecified mechanism (H)   (Z74.09) Does not walk   (R63.8) Decreased oral intake   (R62.7) Failure to thrive in adult   (Z51.5) Hospice care patient  Comment/Plan: goal is comfort, conts to eat less, weight now is 123# on 5/9/23--had been 159# last fall 2022     (C44.320) 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     (N40.1,  R35.0) Benign prostatic hyperplasia with urinary frequency  Comment/Plan: conts with Flomax--incontinent, stop Flomax if not able to take pills    (I25.10) Coronary artery disease  involving native coronary artery of native heart without angina pectoris  (Z86.79) History of atrial fibrillation  Comment/Plan: goal is comfort, appreciate Hospice care, on no meds         Electronically Signed by:  ADRYAN Castellanos CNP      Sincerely,        ADRYAN Castellanos CNP

## 2023-07-21 NOTE — LETTER
"    7/21/2023        RE: Rell Erazo  [facility]  [address]  [facility] [address]        Chief Complaint: F/U Hospice Patient      HPI:    Rell Erazo is a 89 year old  (6/5/1928), who [facility] ([facility] Today's concern is:     Moderate dementia with behavioral disturbance (H)  Cerebral infarction, unspecified mechanism (H)  Does not walk  Decreased oral intake  Failure to thrive in adult  Hospice care patient  Squamous cell carcinoma of skin of face  Benign prostatic hyperplasia with urinary frequency  History of atrial fibrillation      BP (!) 151/51   Pulse 78   Temp 98.4  F (36.9  C)   Resp 16   Ht 1.727 m (5' 8\")   Wt 72.1 kg (159 lb)   SpO2 92%   BMI 24.18 kg/m    TODAY'S EXAM:  SUBJECTIVE and OBJECTIVE DATA:   Mostly non verbal \"I am ok\". Has direct eye contact--followed commands during exam. Alert in no distress. Lungs CTA but decreased. RRR. No  Edema. squamous cancer forehead between eyes is larger--dry, no drainage.  Abdomen is soft, +BTS-hypoactive.       Current Outpatient Medications   Medication Sig Dispense Refill     ACE/ARB/ARNI NOT PRESCRIBED (INTENTIONAL) Please choose reason not prescribed from choices below.       acetaminophen (TYLENOL) 650 MG suppository UNWRAP AND INSERT 1 SUPPOSITORY RECTALLY EVERY 6 HOURS AS NEEDED (DX:PAIN/FEVER) (MAX 4GM TYLENOL OR ACETAMINOPHEN/24 HRS) 12 suppository 10     ACETAMINOPHEN EXTRA STRENGTH 500 MG tablet 2 TABLETS (1000MG ) ORALLY 2 TIMES DAILY (MAX 4GM TYLENOL OR ACETAMINOPHEN/24 HRS) (DX: PAIN);2 TABLETS (1000MG ) ORALLY DAILY AS NEEDED (DX: PAIN) 180 tablet 10     bisacodyl (DULCOLAX) 10 MG suppository UNWRAP AND INSERT 1 SUPPOSITORY RECTALLY DAILY AS NEEDED  (DX: CONSTIPATION) 30 suppository 10     finasteride (PROSCAR) 5 MG tablet 1 TABLET ORALLY DAILY  (DX: URINARY RETENTION ) 30 tablet 10     hyoscyamine 0.125 MG TBDP 1 " TABLET SUBLINGUALLY EVERY 6 HOURS AS NEEDED   (DX: SECRETIONS ) 30 tablet 10     LORazepam (ATIVAN) 0.5 MG tablet 1 TABLET ORALLY EVERY 6 HOURS AS NEEDED  (DX: ANXIETY) / (DX: AGITATION) 30 tablet 4     LORazepam (ATIVAN) 1 MG tablet Take 0.5 tablets (0.5 mg) by mouth daily as needed for agitation or anxiety 10 tablet 1     morphine 2.5 MG solu-tab Take 2.5 mg by mouth every hour as needed for shortness of breath or moderate to severe pain       nystatin (MYCOSTATIN) 872815 UNIT/GM external cream APPLY TOPICALLY TO REDNESS IN BILATERAL GROIN 2 TIMES DAILY;APPLY TOPICALLY TO REDNESS 2 TIMES DAILY AS NEEDED 30 g 10     tamsulosin (FLOMAX) 0.4 MG capsule 1 CAPSULE ORALLY DAILY (DX: BENIGN PROSTATIC HYPERPLASIA) 30 capsule 10     ASSESSMENT / PLAN:  (F03.B18) Moderate dementia with behavioral disturbance (H)  (primary encounter diagnosis)   (I63.9) Cerebral infarction, unspecified mechanism (H)   (Z74.09) Does not walk   (R63.8) Decreased oral intake   (R62.7) Failure to thrive in adult   (Z51.5) Hospice care patient  Comment/Plan: conts to slowly decline, Needs FULL Care--24 hr cares, is comfortable, monitor.    (C44.320) Squamous cell carcinoma of skin of face: forehead  Comment/Plan: cont to increase in size--no obvious pain, goal is overall comfort.    (N40.1,  R35.0) Benign prostatic hyperplasia with urinary frequency  Comment/Plan: cont with Proscar and Flomax--still able to take meds, monitor.    (86.79) History of atrial fibrillation  Comment/Plan: no meds,        Electronically Signed by:  ADRYAN Castellanos CNP      Sincerely,        ADRYAN Castellanos CNP

## 2023-07-21 NOTE — PROGRESS NOTES
"Chief Complaint: F/U Hospice Patient      HPI:    Rell Erazo is a 89 year old  (6/5/1928), who is being seen today for an episodic care visit at [facility] ([facility]). Today's concern is:     Moderate dementia with behavioral disturbance (H)  Cerebral infarction, unspecified mechanism (H)  Does not walk  Decreased oral intake  Failure to thrive in adult  Hospice care patient  Squamous cell carcinoma of skin of face  Benign prostatic hyperplasia with urinary frequency  History of atrial fibrillation      BP (!) 151/51   Pulse 78   Temp 98.4  F (36.9  C)   Resp 16   Ht 1.727 m (5' 8\")   Wt 72.1 kg (159 lb)   SpO2 92%   BMI 24.18 kg/m    TODAY'S EXAM:  SUBJECTIVE and OBJECTIVE DATA:   Mostly non verbal \"I am ok\". Has direct eye contact--followed commands during exam. Alert in no distress. Lungs CTA but decreased. RRR. No  Edema. squamous cancer forehead between eyes is larger--dry, no drainage.  Abdomen is soft, +BTS-hypoactive.       Current Outpatient Medications   Medication Sig Dispense Refill     ACE/ARB/ARNI NOT PRESCRIBED (INTENTIONAL) Please choose reason not prescribed from choices below.       acetaminophen (TYLENOL) 650 MG suppository UNWRAP AND INSERT 1 SUPPOSITORY RECTALLY EVERY 6 HOURS AS NEEDED (DX:PAIN/FEVER) (MAX 4GM TYLENOL OR ACETAMINOPHEN/24 HRS) 12 suppository 10     ACETAMINOPHEN EXTRA STRENGTH 500 MG tablet 2 TABLETS (1000MG ) ORALLY 2 TIMES DAILY (MAX 4GM TYLENOL OR ACETAMINOPHEN/24 HRS) (DX: PAIN);2 TABLETS (1000MG ) ORALLY DAILY AS NEEDED (DX: PAIN) 180 tablet 10     bisacodyl (DULCOLAX) 10 MG suppository UNWRAP AND INSERT 1 SUPPOSITORY RECTALLY DAILY AS NEEDED  (DX: CONSTIPATION) 30 suppository 10     finasteride (PROSCAR) 5 MG tablet 1 TABLET ORALLY DAILY  (DX: URINARY RETENTION ) 30 tablet 10     hyoscyamine 0.125 MG TBDP 1 TABLET SUBLINGUALLY EVERY 6 HOURS AS NEEDED   (DX: SECRETIONS ) 30 tablet 10     LORazepam (ATIVAN) 0.5 MG tablet 1 TABLET " ORALLY EVERY 6 HOURS AS NEEDED  (DX: ANXIETY) / (DX: AGITATION) 30 tablet 4     LORazepam (ATIVAN) 1 MG tablet Take 0.5 tablets (0.5 mg) by mouth daily as needed for agitation or anxiety 10 tablet 1     morphine 2.5 MG solu-tab Take 2.5 mg by mouth every hour as needed for shortness of breath or moderate to severe pain       nystatin (MYCOSTATIN) 477161 UNIT/GM external cream APPLY TOPICALLY TO REDNESS IN BILATERAL GROIN 2 TIMES DAILY;APPLY TOPICALLY TO REDNESS 2 TIMES DAILY AS NEEDED 30 g 10     tamsulosin (FLOMAX) 0.4 MG capsule 1 CAPSULE ORALLY DAILY (DX: BENIGN PROSTATIC HYPERPLASIA) 30 capsule 10     ASSESSMENT / PLAN:  (F03.B18) Moderate dementia with behavioral disturbance (H)  (primary encounter diagnosis)   (I63.9) Cerebral infarction, unspecified mechanism (H)   (Z74.09) Does not walk   (R63.8) Decreased oral intake   (R62.7) Failure to thrive in adult   (Z51.5) Hospice care patient  Comment/Plan: conts to slowly decline, Needs FULL Care--24 hr cares, is comfortable, monitor.    (C44.320) Squamous cell carcinoma of skin of face: forehead  Comment/Plan: cont to increase in size--no obvious pain, goal is overall comfort.    (N40.1,  R35.0) Benign prostatic hyperplasia with urinary frequency  Comment/Plan: cont with Proscar and Flomax--still able to take meds, monitor.    (86.79) History of atrial fibrillation  Comment/Plan: no meds,        Electronically Signed by:  ADRYAN Castellanos CNP

## 2023-08-08 NOTE — TELEPHONE ENCOUNTER
[place] NON-EMERGENT ENCOUNTER    Rell Erazo is a 89 year old  (6/5/1928), Voicemail Message received today regarding: Unwitnessed fall    Disposition  Pt found on the floor of his room. No injury or pain reported or observed. Vitals stable and ROM and neuros intact. Facility will continue to monitor per protocol. Hospice and PCP updated.    Electronically signed by:   Milagros Luz RN

## 2023-09-07 PROBLEM — R63.4 WEIGHT LOSS: Status: ACTIVE | Noted: 2023-01-01

## 2023-09-07 NOTE — LETTER
"    9/7/2023        RE: Rell Erazo  [facility]  [address]  [facility] [address]        Chief Complaint:  f/U     HPI:    Rell Erazo is a 89 year old  (6/5/1928), who is being [facility] today [facility] . Today's concern is:     Moderate dementia with behavioral disturbance (H)  Cerebral infarction, unspecified mechanism (H)  Does not walk  Failure to thrive in adult  Weight loss  Hospice care patient  Squamous cell carcinoma of skin of face  History of atrial fibrillation     /64   Pulse 78   Resp 16   Ht 1.727 m (5' 8\")   Wt 55.8 kg (122 lb 15.9 oz)   SpO2 92%   BMI 18.70 kg/m    TODAY'S EXAM:  SUBJECTIVE and OBJECTIVE DATA:   Opens eyes to voice.  Nodded and said \"no\" to question of Pain, sob, CP, nausea.  in no distress. Lungs CTA but decreased.muffled heart sounds, soft semi, no edema.  Abdomen is soft, +BTS. FREIRE. Forehead previous squamous cell cancer site: healed, intact skin with scar. No new focal neurological deficits.     Current Outpatient Medications   Medication Sig Dispense Refill     ACE/ARB/ARNI NOT PRESCRIBED (INTENTIONAL) Please choose reason not prescribed from choices below.       acetaminophen (TYLENOL) 650 MG suppository UNWRAP AND INSERT 1 SUPPOSITORY RECTALLY EVERY 6 HOURS AS NEEDED (DX:PAIN/FEVER) (MAX 4GM TYLENOL OR ACETAMINOPHEN/24 HRS) 12 suppository 10     ACETAMINOPHEN EXTRA STRENGTH 500 MG tablet 2 TABLETS (1000MG ) ORALLY 2 TIMES DAILY (MAX 4GM TYLENOL OR ACETAMINOPHEN/24 HRS) (DX: PAIN);2 TABLETS (1000MG ) ORALLY DAILY AS NEEDED (DX: PAIN) 180 tablet 10     bisacodyl (DULCOLAX) 10 MG suppository UNWRAP AND INSERT 1 SUPPOSITORY RECTALLY DAILY AS NEEDED  (DX: CONSTIPATION) 30 suppository 10     finasteride (PROSCAR) 5 MG tablet 1 TABLET ORALLY DAILY  (DX: URINARY RETENTION ) 30 tablet 10     hyoscyamine 0.125 MG TBDP 1 TABLET SUBLINGUALLY EVERY 6 HOURS AS NEEDED   (DX: " SECRETIONS ) 30 tablet 10     LORazepam (ATIVAN) 0.5 MG tablet 1 TABLET ORALLY EVERY 6 HOURS AS NEEDED  (DX: ANXIETY) / (DX: AGITATION) 30 tablet 4     LORazepam (ATIVAN) 1 MG tablet Take 0.5 tablets (0.5 mg) by mouth daily as needed for agitation or anxiety 10 tablet 1     morphine 2.5 MG solu-tab Take 2.5 mg by mouth every hour as needed for shortness of breath or moderate to severe pain       nystatin (MYCOSTATIN) 540216 UNIT/GM external cream APPLY TOPICALLY TO REDNESS IN BILATERAL GROIN 2 TIMES DAILY;APPLY TOPICALLY TO REDNESS 2 TIMES DAILY AS NEEDED 30 g 10     tamsulosin (FLOMAX) 0.4 MG capsule 1 CAPSULE ORALLY DAILY (DX: BENIGN PROSTATIC HYPERPLASIA) 30 capsule 10     ASSESSMENT / PLAN:  (F03.B18) Moderate dementia with behavioral disturbance (H)  (primary encounter diagnosis)  (I63.9) Cerebral infarction, unspecified mechanism (H)  (Z74.09) Does not walk  (R62.7) Failure to thrive in adult  (R63.4) Weight loss  Comment/Plan: weight loss, less po intake, will ask for weight this month , goal is comfort.    (Z51.5) Hospice care patient  Comment/Plan: appreciate hospice care, no changes.    (C44.320) Squamous cell carcinoma of skin of face: forehead  Comment/Plan: healed wound, monitor. No further surgeries if recurs.    (Z86.79) History of atrial fibrillation  Comment/Plan: on no meds, rate controlled in the 70-80's mostly.        Electronically Signed by:  ADRYAN Castellanos CNP       Sincerely,        ADRYAN Castellanos CNP

## 2023-09-07 NOTE — PROGRESS NOTES
"Chief Complaint:  f/U     HPI:    Rell Erazo is a 89 year old  (6/5/1928), who is being seen today for an episodic care visit at [facility] . Today's concern is:     Moderate dementia with behavioral disturbance (H)  Cerebral infarction, unspecified mechanism (H)  Does not walk  Failure to thrive in adult  Weight loss  Hospice care patient  Squamous cell carcinoma of skin of face  History of atrial fibrillation     /64   Pulse 78   Resp 16   Ht 1.727 m (5' 8\")   Wt 55.8 kg (122 lb 15.9 oz)   SpO2 92%   BMI 18.70 kg/m    TODAY'S EXAM:  SUBJECTIVE and OBJECTIVE DATA:   Opens eyes to voice.  Nodded and said \"no\" to question of Pain, sob, CP, nausea.  in no distress. Lungs CTA but decreased.muffled heart sounds, soft semi, no edema.  Abdomen is soft, +BTS. FREIRE. Forehead previous squamous cell cancer site: healed, intact skin with scar. No new focal neurological deficits.     Current Outpatient Medications   Medication Sig Dispense Refill    ACE/ARB/ARNI NOT PRESCRIBED (INTENTIONAL) Please choose reason not prescribed from choices below.      acetaminophen (TYLENOL) 650 MG suppository UNWRAP AND INSERT 1 SUPPOSITORY RECTALLY EVERY 6 HOURS AS NEEDED (DX:PAIN/FEVER) (MAX 4GM TYLENOL OR ACETAMINOPHEN/24 HRS) 12 suppository 10    ACETAMINOPHEN EXTRA STRENGTH 500 MG tablet 2 TABLETS (1000MG ) ORALLY 2 TIMES DAILY (MAX 4GM TYLENOL OR ACETAMINOPHEN/24 HRS) (DX: PAIN);2 TABLETS (1000MG ) ORALLY DAILY AS NEEDED (DX: PAIN) 180 tablet 10    bisacodyl (DULCOLAX) 10 MG suppository UNWRAP AND INSERT 1 SUPPOSITORY RECTALLY DAILY AS NEEDED  (DX: CONSTIPATION) 30 suppository 10    finasteride (PROSCAR) 5 MG tablet 1 TABLET ORALLY DAILY  (DX: URINARY RETENTION ) 30 tablet 10    hyoscyamine 0.125 MG TBDP 1 TABLET SUBLINGUALLY EVERY 6 HOURS AS NEEDED   (DX: SECRETIONS ) 30 tablet 10    LORazepam (ATIVAN) 0.5 MG tablet 1 TABLET ORALLY EVERY 6 HOURS AS NEEDED  (DX: ANXIETY) / (DX: " AGITATION) 30 tablet 4    LORazepam (ATIVAN) 1 MG tablet Take 0.5 tablets (0.5 mg) by mouth daily as needed for agitation or anxiety 10 tablet 1    morphine 2.5 MG solu-tab Take 2.5 mg by mouth every hour as needed for shortness of breath or moderate to severe pain      nystatin (MYCOSTATIN) [id number] UNIT/GM external cream APPLY TOPICALLY TO REDNESS IN BILATERAL GROIN 2 TIMES DAILY;APPLY TOPICALLY TO REDNESS 2 TIMES DAILY AS NEEDED 30 g 10    tamsulosin (FLOMAX) 0.4 MG capsule 1 CAPSULE ORALLY DAILY (DX: BENIGN PROSTATIC HYPERPLASIA) 30 capsule 10     ASSESSMENT / PLAN:  (F03.B18) Moderate dementia with behavioral disturbance (H)  (primary encounter diagnosis)  (I63.9) Cerebral infarction, unspecified mechanism (H)  (Z74.09) Does not walk  (R62.7) Failure to thrive in adult  (R63.4) Weight loss  Comment/Plan: weight loss, less po intake, will ask for weight this month , goal is comfort.    (Z51.5) Hospice care patient  Comment/Plan: appreciate hospice care, no changes.    (C44.320) Squamous cell carcinoma of skin of face: forehead  Comment/Plan: healed wound, monitor. No further surgeries if recurs.    (Z86.79) History of atrial fibrillation  Comment/Plan: on no meds, rate controlled in the 70-80's mostly.        Electronically Signed by:  ADRYAN Castellanos CNP

## 2023-11-08 NOTE — PROGRESS NOTES
[facility]  [place] Medical Record Number:  [id number]  Place of Service where encounter took place:  [facility] ([facility] [[id number]]  Chief Complaint   Patient presents with    Nursing Home Acute       HPI:    Rell Erazo  is a 89 year old (6/5/1928), who is being seen today for an episodic care visit.  HPI information obtained from: facility chart records, facility staff, [facility] chart review, and [employer] EMR . Today's concern is: per staff no acute issues,   he has been calm, often feeds self, like today. He is alert sitting in bed     Moderate dementia with behavioral disturbance (H)  Cerebral infarction, unspecified mechanism (H)  Does not walk  Failure to thrive in adult  Hospice care patient  Squamous cell carcinoma of skin of face  History of atrial fibrillation     Past Medical and Surgical History reviewed in Epic today.    MEDICATIONS:     Current Outpatient Medications   Medication Sig Dispense Refill    ACE/ARB/ARNI NOT PRESCRIBED (INTENTIONAL) Please choose reason not prescribed from choices below.      acetaminophen (TYLENOL) 650 MG suppository UNWRAP AND INSERT 1 SUPPOSITORY RECTALLY EVERY 6 HOURS AS NEEDED (DX:PAIN/FEVER) (MAX 4GM TYLENOL OR ACETAMINOPHEN/24 HRS) 12 suppository 10    ACETAMINOPHEN EXTRA STRENGTH 500 MG tablet 2 TABLETS (1000MG ) ORALLY 2 TIMES DAILY (MAX 4GM TYLENOL OR ACETAMINOPHEN/24 HRS) (DX: PAIN);2 TABLETS (1000MG ) ORALLY DAILY AS NEEDED (DX: PAIN) 180 tablet 10    bisacodyl (DULCOLAX) 10 MG suppository UNWRAP AND INSERT 1 SUPPOSITORY RECTALLY DAILY AS NEEDED  (DX: CONSTIPATION) 30 suppository 10    finasteride (PROSCAR) 5 MG tablet 1 TABLET ORALLY DAILY  (DX: URINARY RETENTION ) 30 tablet 10    hyoscyamine 0.125 MG TBDP 1 TABLET SUBLINGUALLY EVERY 6 HOURS AS NEEDED   (DX: SECRETIONS ) 30 tablet 10    LORazepam (ATIVAN) 0.5 MG tablet 1 TABLET ORALLY EVERY 6 HOURS AS NEEDED (DX: ANXIETY/AGITATION) 30 tablet 4    LORazepam  "(ATIVAN) 1 MG tablet Take 0.5 tablets (0.5 mg) by mouth daily as needed for agitation or anxiety 10 tablet 1    morphine 2.5 MG solu-tab Take 2.5 mg by mouth every hour as needed for shortness of breath or moderate to severe pain      nystatin (MYCOSTATIN) 816040 UNIT/GM external cream APPLY TOPICALLY TO REDNESS IN BILATERAL GROIN 2 TIMES DAILY;APPLY TOPICALLY TO REDNESS 2 TIMES DAILY AS NEEDED 30 g 10    tamsulosin (FLOMAX) 0.4 MG capsule 1 CAPSULE ORALLY DAILY (DX: BENIGN PROSTATIC HYPERPLASIA) 30 capsule 10        TODAY DURING EXAM/ROS: mostly non verbal.  Nodded head \"no\" to:  CP, SOB, Cough,  HA, N/V. Nodded heard \"Yes\" to hungry and feeling fine.   Per staff he is incont urine and stool.      Objective:  /64   Pulse 78   Temp 98.4  F (36.9  C)   Resp 16   Ht 1.727 m (5' 8\")   Wt 55.3 kg (122 lb)   SpO2 92%   BMI 18.55 kg/m    EXAM:  Is in NAD. Lungs CTA but decreased, RRR, No murmur heard today.  Has no edema.  Abdomen is soft, faint +BTS. FREIRE--feeding self with right hand. Forehead previous squamous cell cancer site: healed with scar. No new focal neurological deficits.    Labs:   Patient is on hospice/palliative care and labs are not recommended    ASSESSMENT / PLAN:  (F03.B18) Moderate dementia with behavioral disturbance (H)  (primary encounter diagnosis)  (I63.9) Cerebral infarction, unspecified mechanism (H)  (Z74.09) Does not walk  (R62.7) Failure to thrive in adult  (Z51.5) Hospice care patient  Comment/Plan:  has been overall comfortable, no acute issues.  Will ask staff for recent wts.    (C44.320) Squamous cell carcinoma of skin of face: forehead  Comment/Plan: healed    (Z86.79) History of atrial fibrillation  Comment/Plan: no longer on meds--goal is comfort.    Electronically signed by:  ADRYAN Castellanos CNP            "

## 2023-11-09 NOTE — LETTER
11/9/2023        RE: Rell Erazo  [facility]  [address]  [facility] [address]        [facility]  [place] [facility] Record Number:  [id number]  Place of Service where encounter took place:  [facility] ([facility] [[id number]]  Chief Complaint   Patient presents with     Nursing Home Acute       HPI:    Rell Erazo  is a 89 year old (6/5/1928), who is being seen today for an episodic care visit.  HPI information obtained from: facility chart records, facility staff, [facility] chart review, and [employer] EMR . Today's concern is: per staff no acute issues,   he has been calm, often feeds self, like today. He is alert sitting in bed     Moderate dementia with behavioral disturbance (H)  Cerebral infarction, unspecified mechanism (H)  Does not walk  Failure to thrive in adult  Hospice care patient  Squamous cell carcinoma of skin of face  History of atrial fibrillation     Past Medical and Surgical History reviewed in Epic today.    MEDICATIONS:     Current Outpatient Medications   Medication Sig Dispense Refill     ACE/ARB/ARNI NOT PRESCRIBED (INTENTIONAL) Please choose reason not prescribed from choices below.       acetaminophen (TYLENOL) 650 MG suppository UNWRAP AND INSERT 1 SUPPOSITORY RECTALLY EVERY 6 HOURS AS NEEDED (DX:PAIN/FEVER) (MAX 4GM TYLENOL OR ACETAMINOPHEN/24 HRS) 12 suppository 10     ACETAMINOPHEN EXTRA STRENGTH 500 MG tablet 2 TABLETS (1000MG ) ORALLY 2 TIMES DAILY (MAX 4GM TYLENOL OR ACETAMINOPHEN/24 HRS) (DX: PAIN);2 TABLETS (1000MG ) ORALLY DAILY AS NEEDED (DX: PAIN) 180 tablet 10     bisacodyl (DULCOLAX) 10 MG suppository UNWRAP AND INSERT 1 SUPPOSITORY RECTALLY DAILY AS NEEDED  (DX: CONSTIPATION) 30 suppository 10     finasteride (PROSCAR) 5 MG tablet 1 TABLET ORALLY DAILY  (DX: URINARY RETENTION ) 30 tablet 10     hyoscyamine 0.125 MG TBDP 1 TABLET SUBLINGUALLY EVERY 6 HOURS AS NEEDED   (DX: SECRETIONS ) 30 tablet  "10     LORazepam (ATIVAN) 0.5 MG tablet 1 TABLET ORALLY EVERY 6 HOURS AS NEEDED (DX: ANXIETY/AGITATION) 30 tablet 4     LORazepam (ATIVAN) 1 MG tablet Take 0.5 tablets (0.5 mg) by mouth daily as needed for agitation or anxiety 10 tablet 1     morphine 2.5 MG solu-tab Take 2.5 mg by mouth every hour as needed for shortness of breath or moderate to severe pain       nystatin (MYCOSTATIN) 591628 UNIT/GM external cream APPLY TOPICALLY TO REDNESS IN BILATERAL GROIN 2 TIMES DAILY;APPLY TOPICALLY TO REDNESS 2 TIMES DAILY AS NEEDED 30 g 10     tamsulosin (FLOMAX) 0.4 MG capsule 1 CAPSULE ORALLY DAILY (DX: BENIGN PROSTATIC HYPERPLASIA) 30 capsule 10        TODAY DURING EXAM/ROS: mostly non verbal.  Nodded head \"no\" to:  CP, SOB, Cough,  HA, N/V. Nodded heard \"Yes\" to hungry and feeling fine.   Per staff he is incont urine and stool.      Objective:  /64   Pulse 78   Temp 98.4  F (36.9  C)   Resp 16   Ht 1.727 m (5' 8\")   Wt 55.3 kg (122 lb)   SpO2 92%   BMI 18.55 kg/m    EXAM:  Is in NAD. Lungs CTA but decreased, RRR, No murmur heard today.  Has no edema.  Abdomen is soft, faint +BTS. FREIRE--feeding self with right hand. Forehead previous squamous cell cancer site: healed with scar. No new focal neurological deficits.    Labs:   Patient is on hospice/palliative care and labs are not recommended    ASSESSMENT / PLAN:  (F03.B18) Moderate dementia with behavioral disturbance (H)  (primary encounter diagnosis)  (I63.9) Cerebral infarction, unspecified mechanism (H)  (Z74.09) Does not walk  (R62.7) Failure to thrive in adult  (Z51.5) Hospice care patient  Comment/Plan:  has been overall comfortable, no acute issues.  Will ask staff for recent wts.    (C44.320) Squamous cell carcinoma of skin of face: forehead  Comment/Plan: healed    (Z86.79) History of atrial fibrillation  Comment/Plan: no longer on meds--goal is comfort.    Electronically signed by:  ADRYAN Castellanos CNP                Sincerely,        Selam" ADRYAN Freeman CNP

## 2023-12-19 NOTE — LETTER
12/19/2023        RE: Rell Erazo  [facility]  [address]  [facility] [address]        [place] [facility]  [place] Medical Record Number:  [id number]  Place of Service where encounter took place:  [facility] ([facility]) [[id number]]  Chief Complaint   Patient presents with     Discharge Summary Nursing Home       HPI:    Rell Erazo  is a 89 year old (6/5/1928), who is being seen today for an episodic care visit.  HPI information obtained from: facility chart records, facility staff, and [facility] chart review. Today's concern is: Patient was admitted to the above facility from his daughter's home 4/14/21. Patient will be moving to [facility] d/t increasing care need and be followed by same team     Moderate dementia with behavioral disturbance (H)  Cerebral infarction, unspecified mechanism (H)  History of intracranial hemorrhage  Hospice care patient  Coronary artery disease involving native coronary artery of native heart without angina pectoris  Hx of CABG  Mixed hyperlipidemia  History of atrial fibrillation  Benign prostatic hyperplasia, unspecified whether lower urinary tract symptoms present     Past Medical and Surgical History reviewed in Epic today.    MEDICATIONS:     Current Outpatient Medications   Medication Sig Dispense Refill     ACE/ARB/ARNI NOT PRESCRIBED (INTENTIONAL) Please choose reason not prescribed from choices below.       acetaminophen (TYLENOL) 650 MG suppository UNWRAP AND INSERT 1 SUPPOSITORY RECTALLY EVERY 6 HOURS AS NEEDED (DX:PAIN/FEVER) (MAX 4GM TYLENOL OR ACETAMINOPHEN/24 HRS) 12 suppository 10     ACETAMINOPHEN EXTRA STRENGTH 500 MG tablet 2 TABLETS (1000MG ) ORALLY 2 TIMES DAILY (MAX 4GM TYLENOL OR ACETAMINOPHEN/24 HRS) (DX: PAIN);2 TABLETS (1000MG ) ORALLY DAILY AS NEEDED (DX: PAIN) 180 tablet 10     bisacodyl (DULCOLAX) 10 MG suppository UNWRAP AND INSERT 1 SUPPOSITORY RECTALLY DAILY AS NEEDED  (DX:  "CONSTIPATION) 30 suppository 10     finasteride (PROSCAR) 5 MG tablet 1 TABLET ORALLY DAILY  (DX: URINARY RETENTION ) 30 tablet 10     hyoscyamine 0.125 MG TBDP 1 TABLET SUBLINGUALLY EVERY 6 HOURS AS NEEDED   (DX: SECRETIONS ) 30 tablet 10     LORazepam (ATIVAN) 0.5 MG tablet 1 TABLET ORALLY EVERY 6 HOURS AS NEEDED (DX: ANXIETY/AGITATION) 30 tablet 4     LORazepam (ATIVAN) 1 MG tablet Take 0.5 tablets (0.5 mg) by mouth daily as needed for agitation or anxiety 10 tablet 1     morphine 2.5 MG solu-tab Take 2.5 mg by mouth every hour as needed for shortness of breath or moderate to severe pain       nystatin (MYCOSTATIN) 165032 UNIT/GM external cream APPLY TOPICALLY TO REDNESS IN BILATERAL GROIN 2 TIMES DAILY;APPLY TOPICALLY TO REDNESS 2 TIMES DAILY AS NEEDED 30 g 10     tamsulosin (FLOMAX) 0.4 MG capsule 1 CAPSULE ORALLY DAILY (DX: BENIGN PROSTATIC HYPERPLASIA) 30 capsule 10          REVIEW OF SYSTEMS:  Very limited and nonsensical secondary to cognitive impairment.     Objective:  /64   Pulse 78   Temp 98.4  F (36.9  C)   Resp 16   Ht 1.727 m (5' 8\")   Wt 55.3 kg (122 lb)   SpO2 92%   BMI 18.55 kg/m    Exam:  GENERAL APPEARANCE: awake, thin, not able to understand mumbles  ENT:  oral mucous membranes moist  EYES:  Conjunctivae, lids, pupils and irises normal, Conjunctiva and lids normal  RESP:  respiratory effort  of chest normal, lungs CTA but  diminished breath sounds  non labored  CV:  Auscultation of heart done ,IRRR, no or  edema  ABDOMEN:  decreased BTs,  soft, nontender, flat abd.  M/S:   FREIRE randomly, lying in bed  SKIN:  Inspection of skin at baseline, previous forehead cancer site: scar and healed--no new issues.  NEURO:   random movements        Labs:   None recently as has been on hospice  Recent Labs   Lab Test 02/08/22  0729 04/27/21  0902    140   POTASSIUM 4.1 4.3   CHLORIDE 106 103   CO2 25 27   ANIONGAP 9 10   GLC 91 128*   BUN 21 26   CR 1.31* 1.48*   MING 8.8 8.9     Hemoglobin "   Date Value Ref Range Status   02/08/2022 12.0 (L) 13.3 - 17.7 g/dL Final   04/27/2021 13.8 (L) 14.0 - 18.0 g/dL Final   04/27/2021 13.8 (L) 14.0 - 18.0 g/dL Final   10/04/2017 13.3 13.3 - 17.7 g/dL Final     ASSESSMENT / PLAN:  (I63.9) Cerebral infarction, unspecified mechanism (H)  (Z86.79) History of intracranial hemorrhage  (F03.B18) Moderate dementia with behavioral disturbance (H)  (primary encounter diagnosis)  Comment/Plan: on no meds except comfort--see below    (Z51.5) Hospice care patient  Comment/Plan: cont  hospice cares and meds, appreciate assistance. Monitor.    (I25.10) Coronary artery disease involving native coronary artery of native heart without angina pectoris  (Z95.1) Hx of CABG  (E78.2) Mixed hyperlipidemia  (Z86.79) History of atrial fibrillation  Comment/Plan: on no meds, goal is comfort.     (N40.0) Benign prostatic hyperplasia, unspecified whether lower urinary tract symptoms present  Comment/Plan: Flomax and Proscar--no changes.     Electronically signed by:  ADRYAN Castellanos CNP                Sincerely,        ADRYAN Castellanos CNP

## 2023-12-19 NOTE — PATIENT INSTRUCTIONS
[facility] Discharge Orders    Name: Rell Erazo  : 1928  Planned Discharge Date: 23  Discharged to: [facility] skilled nursing [facility]    MEDICAL FOLLOW UP  Follow up with PCP in 1-2 weeks    [facility] with [facility]      FUTURE LABS: No labs orders/due    ORDER CHANGES:  none    DISCHARGE MEDICATIONS:  The patient s pharmacy is authorized to dispense a 30-day supply of medications. Refill requests should be directed to the primary provider, Selam Fowler   Transferring meds with pt across [place] to LT unit  Current Outpatient Medications   Medication Sig Dispense Refill    ACE/ARB/ARNI NOT PRESCRIBED (INTENTIONAL) Please choose reason not prescribed from choices below.      acetaminophen (TYLENOL) 650 MG suppository UNWRAP AND INSERT 1 SUPPOSITORY RECTALLY EVERY 6 HOURS AS NEEDED (DX:PAIN/FEVER) (MAX 4GM TYLENOL OR ACETAMINOPHEN/24 HRS) 12 suppository 10    ACETAMINOPHEN EXTRA STRENGTH 500 MG tablet 2 TABLETS (1000MG ) ORALLY 2 TIMES DAILY (MAX 4GM TYLENOL OR ACETAMINOPHEN/24 HRS) (DX: PAIN);2 TABLETS (1000MG ) ORALLY DAILY AS NEEDED (DX: PAIN) 180 tablet 10    bisacodyl (DULCOLAX) 10 MG suppository UNWRAP AND INSERT 1 SUPPOSITORY RECTALLY DAILY AS NEEDED  (DX: CONSTIPATION) 30 suppository 10    finasteride (PROSCAR) 5 MG tablet 1 TABLET ORALLY DAILY  (DX: URINARY RETENTION ) 30 tablet 10    hyoscyamine 0.125 MG TBDP 1 TABLET SUBLINGUALLY EVERY 6 HOURS AS NEEDED   (DX: SECRETIONS ) 30 tablet 10    LORazepam (ATIVAN) 0.5 MG tablet 1 TABLET ORALLY EVERY 6 HOURS AS NEEDED (DX: ANXIETY/AGITATION) 30 tablet 4    LORazepam (ATIVAN) 1 MG tablet Take 0.5 tablets (0.5 mg) by mouth daily as needed for agitation or anxiety 10 tablet 1    morphine 2.5 MG solu-tab Take 2.5 mg by mouth every hour as needed for shortness of breath or moderate to severe pain      nystatin (MYCOSTATIN) 163645 UNIT/GM external cream APPLY TOPICALLY TO REDNESS IN BILATERAL GROIN 2  TIMES DAILY;APPLY TOPICALLY TO REDNESS 2 TIMES DAILY AS NEEDED 30 g 10    tamsulosin (FLOMAX) 0.4 MG capsule 1 CAPSULE ORALLY DAILY (DX: BENIGN PROSTATIC HYPERPLASIA) 30 capsule 10       SERVICES:  None needed    ADDITIONAL INSTRUCTIONS:  None    ADRYAN Castellanos CNP  This document was electronically signed on December 19, 2023

## 2023-12-19 NOTE — PROGRESS NOTES
[place] [facility]  [place] Medical Record Number:  [id number]  Place of Service where encounter took place:  [facility] ([facility]) [[id number]]  Chief Complaint   Patient presents with    Discharge Summary Nursing Home       HPI:    Rell Erazo  is a 89 year old (6/5/1928), who is being seen today for an episodic care visit.  HPI information obtained from: facility chart records, facility staff, and [facility] chart review. Today's concern is: Patient was admitted to the above facility from his daughter's home 4/14/21. Patient will be moving to [facility] d/t increasing care need and be followed by same team     Moderate dementia with behavioral disturbance (H)  Cerebral infarction, unspecified mechanism (H)  History of intracranial hemorrhage  Hospice care patient  Coronary artery disease involving native coronary artery of native heart without angina pectoris  Hx of CABG  Mixed hyperlipidemia  History of atrial fibrillation  Benign prostatic hyperplasia, unspecified whether lower urinary tract symptoms present     Past Medical and Surgical History reviewed in Epic today.    MEDICATIONS:     Current Outpatient Medications   Medication Sig Dispense Refill    ACE/ARB/ARNI NOT PRESCRIBED (INTENTIONAL) Please choose reason not prescribed from choices below.      acetaminophen (TYLENOL) 650 MG suppository UNWRAP AND INSERT 1 SUPPOSITORY RECTALLY EVERY 6 HOURS AS NEEDED (DX:PAIN/FEVER) (MAX 4GM TYLENOL OR ACETAMINOPHEN/24 HRS) 12 suppository 10    ACETAMINOPHEN EXTRA STRENGTH 500 MG tablet 2 TABLETS (1000MG ) ORALLY 2 TIMES DAILY (MAX 4GM TYLENOL OR ACETAMINOPHEN/24 HRS) (DX: PAIN);2 TABLETS (1000MG ) ORALLY DAILY AS NEEDED (DX: PAIN) 180 tablet 10    bisacodyl (DULCOLAX) 10 MG suppository UNWRAP AND INSERT 1 SUPPOSITORY RECTALLY DAILY AS NEEDED  (DX: CONSTIPATION) 30 suppository 10    finasteride (PROSCAR) 5 MG tablet 1 TABLET ORALLY DAILY  (DX: URINARY RETENTION ) 30 tablet 10  "   hyoscyamine 0.125 MG TBDP 1 TABLET SUBLINGUALLY EVERY 6 HOURS AS NEEDED   (DX: SECRETIONS ) 30 tablet 10    LORazepam (ATIVAN) 0.5 MG tablet 1 TABLET ORALLY EVERY 6 HOURS AS NEEDED (DX: ANXIETY/AGITATION) 30 tablet 4    LORazepam (ATIVAN) 1 MG tablet Take 0.5 tablets (0.5 mg) by mouth daily as needed for agitation or anxiety 10 tablet 1    morphine 2.5 MG solu-tab Take 2.5 mg by mouth every hour as needed for shortness of breath or moderate to severe pain      nystatin (MYCOSTATIN) 430347 UNIT/GM external cream APPLY TOPICALLY TO REDNESS IN BILATERAL GROIN 2 TIMES DAILY;APPLY TOPICALLY TO REDNESS 2 TIMES DAILY AS NEEDED 30 g 10    tamsulosin (FLOMAX) 0.4 MG capsule 1 CAPSULE ORALLY DAILY (DX: BENIGN PROSTATIC HYPERPLASIA) 30 capsule 10          REVIEW OF SYSTEMS:  Very limited and nonsensical secondary to cognitive impairment.     Objective:  /64   Pulse 78   Temp 98.4  F (36.9  C)   Resp 16   Ht 1.727 m (5' 8\")   Wt 55.3 kg (122 lb)   SpO2 92%   BMI 18.55 kg/m    Exam:  GENERAL APPEARANCE: awake, thin, not able to understand mumbles  ENT:  oral mucous membranes moist  EYES:  Conjunctivae, lids, pupils and irises normal, Conjunctiva and lids normal  RESP:  respiratory effort  of chest normal, lungs CTA but  diminished breath sounds  non labored  CV:  Auscultation of heart done ,IRRR, no or  edema  ABDOMEN:  decreased BTs,  soft, nontender, flat abd.  M/S:   FREIRE randomly, lying in bed  SKIN:  Inspection of skin at baseline, previous forehead cancer site: scar and healed--no new issues.  NEURO:   random movements        Labs:   None recently as has been on hospice  Recent Labs   Lab Test 02/08/22  0729 04/27/21  0902    140   POTASSIUM 4.1 4.3   CHLORIDE 106 103   CO2 25 27   ANIONGAP 9 10   GLC 91 128*   BUN 21 26   CR 1.31* 1.48*   MING 8.8 8.9     Hemoglobin   Date Value Ref Range Status   02/08/2022 12.0 (L) 13.3 - 17.7 g/dL Final   04/27/2021 13.8 (L) 14.0 - 18.0 g/dL Final   04/27/2021 13.8 " (L) 14.0 - 18.0 g/dL Final   10/04/2017 13.3 13.3 - 17.7 g/dL Final     ASSESSMENT / PLAN:  (I63.9) Cerebral infarction, unspecified mechanism (H)  (Z86.79) History of intracranial hemorrhage  (F03.B18) Moderate dementia with behavioral disturbance (H)  (primary encounter diagnosis)  Comment/Plan: on no meds except comfort--see below    (Z51.5) Hospice care patient  Comment/Plan: cont  hospice cares and meds, appreciate assistance. Monitor.    (I25.10) Coronary artery disease involving native coronary artery of native heart without angina pectoris  (Z95.1) Hx of CABG  (E78.2) Mixed hyperlipidemia  (Z86.79) History of atrial fibrillation  Comment/Plan: on no meds, goal is comfort.     (N40.0) Benign prostatic hyperplasia, unspecified whether lower urinary tract symptoms present  Comment/Plan: Flomax and Proscar--no changes.     Electronically signed by:  ADRYAN Castellanos CNP

## 2023-12-26 PROBLEM — Z91.81 HISTORY OF RECENT FALL: Status: RESOLVED | Noted: 2017-10-09 | Resolved: 2023-01-01

## 2023-12-26 PROBLEM — R21 RASH: Status: RESOLVED | Noted: 2022-07-26 | Resolved: 2023-01-01

## 2023-12-26 NOTE — PROGRESS NOTES
[place] [facility]  PRIMARY CARE PROVIDER AND CLINIC:  ADRYAN Castlelanos CNP, [address] [facility] [address]  Chief Complaint   Patient presents with    [facility] Care     [place] Medical Record Number:  [id number]  Place of Service where encounter took place:  [facility] [place] () [[id number]]    Rell Erazo  is a 89 year old  (6/5/1928), admitted to the above facility from [facility]..  Admitted to this facility for  rehab, medical management, and nursing care.  Rell  was in AL from 4/14/2021 where he had been living with a daughter, care needs and dementia caused him to be admitted to [facility].   He has been on Hospice since last January 2023, and now care needs dictated he be moved to [facility] on 12/21/2023.   Saw him today for admit to LTC and review:       Moderate dementia with behavioral disturbance (H)  Cerebral infarction, unspecified mechanism (H)  History of intracranial hemorrhage  Hospice care patient  Coronary artery disease involving native coronary artery of native heart without angina pectoris  Hx of CABG  History of atrial fibrillation  Benign prostatic hyperplasia, unspecified whether lower urinary tract symptoms present  Squamous cell cancer of external ear, left  Basal cell carcinoma of face  Routine general medical examination at a health care facility      HPI:    HPI information obtained from: facility chart records, facility staff, [facility] chart review, and Care Everywhere [facility] chart review.      History since in AL:  PT had been leaving with daughter who provided cared during much of the COVID crisis.   He needed more cares, support and admitted to AL.  [facility] and [facility] in 2012, stroke in 2014,  ICH while on coumadin after a fall, in 2017.  Dementia worsened.   He had initial periods of agitation in the AL, this did lessen ilda about a  year ago. He still resists care at times though. Admitted to hospice in JANUARY 2023 after hospitalization for weakness and post COVID infection in October of 2022..  Family then felt no further hospitalization appropriate and asked for Hospice.    TODAY DURING EXAM/ROS: mostly nonverbal--says has no pain, sob, cough-- has direct  eye contact. Appetite is varies per staff.  :      CODE STATUS/ADVANCE DIRECTIVES DISCUSSION:   DNR / DNI  Patient's living condition: lives in a skilled nursing facility  ALLERGIES: Cat hair extract  PAST MEDICAL HISTORY:  has a past medical history of ACP (advance care planning) (04/26/2012), Basal cell carcinoma of face (9/16/2010), BPH (benign prostatic hyperplasia), CAD (coronary artery disease), Carotid disease, bilateral (H24), Chronic atrial fibrillation (H), CVA (cerebral infarction) (01/01/2013), Diverticulosis of large intestine, Hyperlipidemia LDL goal < 100 (04/26/2012), Kidney stone (1981), Personal history of tobacco use, presenting hazards to health, Restless legs syndrome (RLS), and TIA (transient ischemic attack) (05/2013).    He has no past medical history of Malignant melanoma (H) or Squamous cell carcinoma of skin, unspecified.  PAST SURGICAL HISTORY:   has a past surgical history that includes Cardiac surgery (04/01/2012) and Bypass graft artery coronary (04/24/2012).  FAMILY HISTORY: family history includes Heart Disease in his brother and father; Other - See Comments in his mother.  SOCIAL HISTORY:   reports that he quit smoking about 43 years ago. His smoking use included cigarettes. He has a 2.8 pack-year smoking history. He has never used smokeless tobacco. He reports current alcohol use of about 7.0 standard drinks of alcohol per week. He reports that he does not use drugs.    Post Discharge Medication Reconciliation Status: discharge medications reconciled and changed, per note/orders     Current Outpatient Medications   Medication Sig Dispense Refill     "ACE/ARB/ARNI NOT PRESCRIBED (INTENTIONAL) Please choose reason not prescribed from choices below.      acetaminophen (TYLENOL) 650 MG suppository UNWRAP AND INSERT 1 SUPPOSITORY RECTALLY EVERY 6 HOURS AS NEEDED (DX:PAIN/FEVER) (MAX 4GM TYLENOL OR ACETAMINOPHEN/24 HRS) 12 suppository 10    ACETAMINOPHEN EXTRA STRENGTH 500 MG tablet 2 TABLETS (1000MG ) ORALLY 2 TIMES DAILY (MAX 4GM TYLENOL OR ACETAMINOPHEN/24 HRS) (DX: PAIN);2 TABLETS (1000MG ) ORALLY DAILY AS NEEDED (DX: PAIN) 180 tablet 10    bisacodyl (DULCOLAX) 10 MG suppository UNWRAP AND INSERT 1 SUPPOSITORY RECTALLY DAILY AS NEEDED  (DX: CONSTIPATION) 30 suppository 10    finasteride (PROSCAR) 5 MG tablet 1 TABLET ORALLY DAILY  (DX: URINARY RETENTION ) 30 tablet 10    hyoscyamine 0.125 MG TBDP 1 TABLET SUBLINGUALLY EVERY 6 HOURS AS NEEDED   (DX: SECRETIONS ) 30 tablet 10    LORazepam (ATIVAN) 0.5 MG tablet 1 TABLET ORALLY EVERY 6 HOURS AS NEEDED (DX: ANXIETY/AGITATION) 30 tablet 4    LORazepam (ATIVAN) 1 MG tablet Take 0.5 tablets (0.5 mg) by mouth daily as needed for agitation or anxiety 10 tablet 1    morphine 2.5 MG solu-tab Take 2.5 mg by mouth every hour as needed for shortness of breath or moderate to severe pain      nystatin (MYCOSTATIN) 756970 UNIT/GM external cream APPLY TOPICALLY TO REDNESS IN BILATERAL GROIN 2 TIMES DAILY;APPLY TOPICALLY TO REDNESS 2 TIMES DAILY AS NEEDED 30 g 10    tamsulosin (FLOMAX) 0.4 MG capsule 1 CAPSULE ORALLY DAILY (DX: BENIGN PROSTATIC HYPERPLASIA) 30 capsule 10            Vitals:  /66   Pulse 64   Temp 97  F (36.1  C)   Resp 18   Ht 1.727 m (5' 8\")   Wt 59.4 kg (131 lb)   SpO2 (!) 75%   BMI 19.92 kg/m    Exam:  GENERAL APPEARANCE: awake, thin, lying in bed random movements  ENT:  oral mucous membranes moist  EYES:  Conjunctivae, lids, pupils and irises normal,.  RESP:  respiratory effort  of chest normal, lungs CTA but  decreased.   CV:  Auscultation of heart done ,IRRR, no or  edema  ABDOMEN:  decreased BTs,  " soft, nontender, flat abd.  M/S:   FREIRE randomly in bed  SKIN:  Inspection of skin at baseline except: groins are clear.  Has a  previous forehead cancer site: scar and healed, has scabbed lesion behind left ear.  NEURO:   random movements  as noted above      Lab/Diagnostic data:  On Hospice    ASSESSMENT / PLAN:  (F03.B18) Moderate dementia with behavioral disturbance (H)  (primary encounter diagnosis)  (I63.9) Cerebral infarction, unspecified mechanism (H)  (Z86.79) History of intracranial hemorrhage  (Z51.5) Hospice care patient  Comment/Plan: no changes from in AL--cont same meds, POC, monitor. Goal is comfort.    (I25.10) Coronary artery disease involving native coronary artery of native heart without angina pectoris  (Z95.1) Hx of CABG  (Z86.79) History of atrial fibrillation  Comment/Plan: on no meds, goal is comfort.    (N40.0) Benign prostatic hyperplasia, unspecified whether lower urinary tract symptoms present  Comment/Plan: Flomax and Proscar--no acute issues, monitor.    (C44.229) Squamous cell cancer of external ear, left  (C44.310) Basal cell carcinoma of face  Comment/Plan: no further treatment.  Ear lesion bleeds now and then a touch.  Monitor.    (Z00.00) Routine general medical examination at a health care facility: ADMIT TO LTC 12/26/2023  Comment/Plan:  goal is comfort       Electronically signed by:  ADRYAN Castellanos CNP

## 2024-01-01 ENCOUNTER — NURSING HOME VISIT (OUTPATIENT)
Dept: GERIATRICS | Facility: CLINIC | Age: 89
End: 2024-01-01
Payer: MEDICARE

## 2024-01-01 ENCOUNTER — TELEPHONE (OUTPATIENT)
Dept: GERIATRICS | Facility: CLINIC | Age: 89
End: 2024-01-01
Payer: MEDICARE

## 2024-01-01 VITALS
OXYGEN SATURATION: 75 % | TEMPERATURE: 97 F | HEIGHT: 68 IN | SYSTOLIC BLOOD PRESSURE: 115 MMHG | HEART RATE: 64 BPM | BODY MASS INDEX: 20.31 KG/M2 | DIASTOLIC BLOOD PRESSURE: 66 MMHG | RESPIRATION RATE: 18 BRPM | WEIGHT: 134 LBS

## 2024-01-01 DIAGNOSIS — N40.0 BENIGN PROSTATIC HYPERPLASIA, UNSPECIFIED WHETHER LOWER URINARY TRACT SYMPTOMS PRESENT: ICD-10-CM

## 2024-01-01 DIAGNOSIS — Z51.5 HOSPICE CARE PATIENT: ICD-10-CM

## 2024-01-01 DIAGNOSIS — C44.229 SQUAMOUS CELL CANCER OF EXTERNAL EAR, LEFT: Primary | ICD-10-CM

## 2024-01-01 DIAGNOSIS — I25.10 CORONARY ARTERY DISEASE INVOLVING NATIVE CORONARY ARTERY OF NATIVE HEART WITHOUT ANGINA PECTORIS: ICD-10-CM

## 2024-01-01 DIAGNOSIS — N18.31 STAGE 3A CHRONIC KIDNEY DISEASE (H): ICD-10-CM

## 2024-01-01 DIAGNOSIS — F03.B0 MODERATE DEMENTIA WITHOUT BEHAVIORAL DISTURBANCE (H): ICD-10-CM

## 2024-01-01 PROCEDURE — 99309 SBSQ NF CARE MODERATE MDM 30: CPT | Mod: GV | Performed by: INTERNAL MEDICINE

## 2024-01-04 NOTE — LETTER
1/4/2024        RE: Rell Erazo  [facility]  [address]  [facility] [address]        Rell Erazo is a 89 year old male seen January 4, 2024 at [facility] where he has resided for almost 3 years (admit to [place] [facility] unit 4/2021) seen for initial visit after transition to LTC in December.    Pt is seen on the unit up to [place], not able to give much history   Nursing staff reports he has done fairly well in transition, not too combative with cares and gets up for meals, intake is variable.   Remains on Hospice     Has a SCC left ear, dressed.   Hospice doing wound care with dressing changes  Q3 days          By chart review, pt had been living in the community with his daughters providing care, but memory concerns worsening and he needed more support, so has moved to [place].   [facility]   He has CAD with h/o CABG in 2012, and cerebral vascular ds with h/o stroke in 2014.  He had a fall with ICH in 2017 while on warfarin +ASA, with worsening of his baseline dementia since that time.  At some point he stopped all medications and admitted to [place] without any scheduled meds.  In 2022 he was started on vit D for level of 19.    In September 2022 pt had SCC removed from forehead and BCC from left chest wall.   Pt had COVID19 infection in October 2022 and was seen in ED by family request due to decreased oral intake and activity in setting of the quarantine.   After discussion, no workup undertaken and he returned to [place].   Consult with Hospice undertaken with daughters and he enrolled in [facility] in January 2023       Past Medical History:   Diagnosis Date     ACP (advance care planning) 04/26/2012     Basal cell carcinoma of face 9/16/2010     BPH (benign prostatic hyperplasia)      CAD (coronary artery disease)     S/P 3-vessel CABG 2012 4/26/2012 s/p CABG 4/24/2012; LIMA to LAd, SV to OM, SV to RCA     Carotid disease,  "bilateral (H24)     Less than 50% stenosis     Chronic atrial fibrillation (H)      CVA (cerebral infarction) 01/01/2013    Small, bifrontal CVA     Diverticulosis of large intestine      Hyperlipidemia LDL goal < 100 04/26/2012    Mixed hyperlipidemia     Kidney stone 1981     Personal history of tobacco use, presenting hazards to health     Quit in 1981     Restless legs syndrome (RLS)      TIA (transient ischemic attack) 05/2013    rt arm weakness       Past Surgical History:   Procedure Laterality Date     BYPASS GRAFT ARTERY CORONARY  04/24/2012    X3 LAD,RCA, obtuse marginal     CARDIAC SURGERY  04/01/2012    x3 CABG     SH:   2018, has 2 daughters.  Previously cared for at home by his daughters.    Attended [employer] Adult Day program.   Worked as a teacher   Was in the [employer]   Past smoker      ROS:    SLUMS 20/30   Safety questionnaire 7.5/27 in 2017  No longer able to test.   Weight in 2022 was 165 lbs   Wt Readings from Last 5 Encounters:   01/04/24 60.8 kg (134 lb)   12/26/23 59.4 kg (131 lb)   12/19/23 55.3 kg (122 lb)   11/09/23 55.3 kg (122 lb)   09/07/23 55.8 kg (122 lb 15.9 oz)      EXAM: very frail, NAD  /66   Pulse 64   Temp 97  F (36.1  C)   Resp 18   Ht 1.727 m (5' 8\")   Wt 60.8 kg (134 lb)   SpO2 (!) 75%   BMI 20.37 kg/m     Left ear with bandage in place    Neck supple without adenopathy  Lungs clear bilaterally  Heart RRR s1s2, +ectopy  Abd soft, NT, no distention or guarding, +BS  Ext without edema, +sarcopenia  Neuro: limited history, generalized weakness  Psych: affect flat     Labs reviewed, no recent checks given goals of care          IMP/PLAN:   (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  (M62.81) Generalized muscle weakness  Comment: WC bound with assist for transfers   Plan: scheduled acetaminophen 1000 mg bid and PRN  Also has PRN MS available     (N40.0) Benign prostatic hyperplasia, unspecified whether lower urinary tract symptoms present "   (N18.31) Stage 3a chronic kidney disease    Comment: GFR 51 at last check  Plan:  finasteride 5 mg/day and tamsulosin 0.4 mg/day to avoid urinary retention     (I25.10) Coronary artery disease involving native coronary artery of native heart without angina pectoris  Comment: s/p CABG  Plan: no current medications, monitor for symptoms and treat to comfort     (I63.89) Cerebral infarction due to other mechanism (H)  (F03.90) Moderate dementia without behavioral disturbance (H)  Comment: may be mixed Alzheimer's /vascular dementia worsening after ICH  Progressive decline, now with significant weight loss and very low functional status  Plan: LTC Memory Care unit support for med admin, meals, activity, secure unit     Lorazepam 0.5 mg prn if any agitation or anxiety     (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       Sincerely,        Emerald Talley MD

## 2024-01-04 NOTE — PROGRESS NOTES
Rell Erazo is a 89 year old male seen January 4, 2024 at [facility] where he has resided for almost 3 years (admit to [facility] unit 4/2021) seen for initial visit after transition to LTC in December.    Pt is seen on the unit up to [place], not able to give much history   Nursing staff reports he has done fairly well in transition, not too combative with cares and gets up for meals, intake is variable.   Remains on Hospice     Has a SCC left ear, dressed.   Hospice doing wound care with dressing changes  Q3 days          By chart review, pt had been living in the community with his daughters providing care, but memory concerns worsening and he needed more support, so has moved to [place].   [facility]   He has CAD with h/o CABG in 2012, and cerebral vascular ds with h/o stroke in 2014.  He had a fall with ICH in 2017 while on warfarin +ASA, with worsening of his baseline dementia since that time.  At some point he stopped all medications and admitted to AL without any scheduled meds.  In 2022 he was started on vit D for level of 19.    In September 2022 pt had SCC removed from forehead and BCC from left chest wall.   Pt had COVID19 infection in October 2022 and was seen in ED by family request due to decreased oral intake and activity in setting of the quarantine.   After discussion, no workup undertaken and he returned to [place].   Consult with Hospice undertaken with daughters and he enrolled in [facility] in January 2023       Past Medical History:   Diagnosis Date    ACP (advance care planning) 04/26/2012    Basal cell carcinoma of face 9/16/2010    BPH (benign prostatic hyperplasia)     CAD (coronary artery disease)     S/P 3-vessel CABG 2012 4/26/2012 s/p CABG 4/24/2012; LIMA to LAd, SV to OM, SV to RCA    Carotid disease, bilateral (H24)     Less than 50% stenosis    Chronic atrial fibrillation (H)     CVA (cerebral infarction) 01/01/2013     "Small, bifrontal CVA    Diverticulosis of large intestine     Hyperlipidemia LDL goal < 100 04/26/2012    Mixed hyperlipidemia    Kidney stone 1981    Personal history of tobacco use, presenting hazards to health     Quit in 1981    Restless legs syndrome (RLS)     TIA (transient ischemic attack) 05/2013    rt arm weakness       Past Surgical History:   Procedure Laterality Date    BYPASS GRAFT ARTERY CORONARY  04/24/2012    X3 LAD,RCA, obtuse marginal    CARDIAC SURGERY  04/01/2012    x3 CABG     SH:   2018, has 2 daughters.  Previously cared for at home by his daughters.    Attended [employer] [facility] program.   Worked as a teacher   Was in the [employer]   Past smoker      ROS:    SLUMS 20/30   Safety questionnaire 7.5/27 in 2017  No longer able to test.   Weight in 2022 was 165 lbs   Wt Readings from Last 5 Encounters:   01/04/24 60.8 kg (134 lb)   12/26/23 59.4 kg (131 lb)   12/19/23 55.3 kg (122 lb)   11/09/23 55.3 kg (122 lb)   09/07/23 55.8 kg (122 lb 15.9 oz)      EXAM: very frail, NAD  /66   Pulse 64   Temp 97  F (36.1  C)   Resp 18   Ht 1.727 m (5' 8\")   Wt 60.8 kg (134 lb)   SpO2 (!) 75%   BMI 20.37 kg/m     Left ear with bandage in place    Neck supple without adenopathy  Lungs clear bilaterally  Heart RRR s1s2, +ectopy  Abd soft, NT, no distention or guarding, +BS  Ext without edema, +sarcopenia  Neuro: limited history, generalized weakness  Psych: affect flat     Labs reviewed, no recent checks given goals of care          IMP/PLAN:   (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  (M62.81) Generalized muscle weakness  Comment: WC bound with assist for transfers   Plan: scheduled acetaminophen 1000 mg bid and PRN  Also has PRN MS available     (N40.0) Benign prostatic hyperplasia, unspecified whether lower urinary tract symptoms present   (N18.31) Stage 3a chronic kidney disease    Comment: GFR 51 at last check  Plan:  finasteride 5 mg/day and tamsulosin 0.4 mg/day " to avoid urinary retention     (I25.10) Coronary artery disease involving native coronary artery of native heart without angina pectoris  Comment: s/p CABG  Plan: no current medications, monitor for symptoms and treat to comfort     (I63.89) Cerebral infarction due to other mechanism (H)  (F03.90) Moderate dementia without behavioral disturbance (H)  Comment: may be mixed Alzheimer's /vascular dementia worsening after ICH  Progressive decline, now with significant weight loss and very low functional status  Plan: LTC Memory Care unit support for med admin, meals, activity, secure unit     Lorazepam 0.5 mg prn if any agitation or anxiety     (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

## 2024-01-18 PROBLEM — F03.B18 MODERATE DEMENTIA WITH BEHAVIORAL DISTURBANCE (H): Status: RESOLVED | Noted: 2022-05-03 | Resolved: 2024-01-01

## 2024-02-01 NOTE — TELEPHONE ENCOUNTER
Geriatrics Notification of Patient Death    Provider: ADRYAN Mckinney CNP    Place of death: [facility] Type:  LTC    Caller: Hospice  Date of Death: 2/1/24 @ 1425    Patient was on Hospice care: Yes; please explain: [facility]  Patient was seen by [facility] provider: Yes; please explain: 1/4/24        Milagros Luz RN